# Patient Record
Sex: FEMALE | Race: WHITE | NOT HISPANIC OR LATINO | Employment: FULL TIME | URBAN - METROPOLITAN AREA
[De-identification: names, ages, dates, MRNs, and addresses within clinical notes are randomized per-mention and may not be internally consistent; named-entity substitution may affect disease eponyms.]

---

## 2017-01-04 ENCOUNTER — MEDICAL CORRESPONDENCE (OUTPATIENT)
Dept: HEALTH INFORMATION MANAGEMENT | Facility: CLINIC | Age: 52
End: 2017-01-04

## 2017-01-10 ENCOUNTER — MYC MEDICAL ADVICE (OUTPATIENT)
Dept: DERMATOLOGY | Facility: CLINIC | Age: 52
End: 2017-01-10

## 2017-01-10 DIAGNOSIS — L30.4 INTERTRIGO: Primary | ICD-10-CM

## 2017-01-17 RX ORDER — FLUCONAZOLE 100 MG/1
TABLET ORAL
Qty: 14 TABLET | Refills: 0
Start: 2017-01-17 | End: 2017-01-19

## 2017-01-19 RX ORDER — FLUCONAZOLE 100 MG/1
TABLET ORAL
Qty: 14 TABLET | Refills: 0 | Status: SHIPPED | OUTPATIENT
Start: 2017-01-19 | End: 2017-03-13

## 2017-02-08 ENCOUNTER — TELEPHONE (OUTPATIENT)
Dept: FAMILY MEDICINE | Facility: CLINIC | Age: 52
End: 2017-02-08

## 2017-02-08 NOTE — Clinical Note
[facility]  [address] [address]  [phone]      February 8, 2017    Rose Mary Rose  [address] [facility] [address]          Dear Rose Mary,    --Mammogram screening is generally recommended every year starting at age of 50.  Some women may choose to be screened at an earlier age ( between 40 & 50) depending on risk factors and discussions with her primary provider. According to our records, your last mammogram was 10/3/2014; please call [phone] to schedule a mammogram.    If you have had this test at an outside facility, please let us know so that we can update your record.     Please disregard this notice if you have already scheduled an appointment.     Sincerely,    Soledad MELENDEZ  [facility] - [phone]  [email or web address]

## 2017-02-08 NOTE — TELEPHONE ENCOUNTER
Panel Management Review      Patient has the following on her problem list:     Asthma review     ACT Total Scores 7/22/2016   ACT TOTAL SCORE -   ASTHMA ER VISITS -   ASTHMA HOSPITALIZATIONS -   ACT TOTAL SCORE (Goal Greater than or Equal to 20) 21   In the past 12 months, how many times did you visit the emergency room for your asthma without being admitted to the hospital? 0   In the past 12 months, how many times were you hospitalized overnight because of your asthma? 0      1. Is Asthma diagnosis on the Problem List? Yes    2. Is Asthma listed on Health Maintenance? Yes    3. Patient is due for:  none      Composite cancer screening  Chart review shows that this patient is due/due soon for the following Mammogram  Summary:    Patient is due/failing the following:   MAMMOGRAM    Action needed:   Needs appointment for mammogram    Type of outreach:    Sent letter.    Questions for provider review:    None                                                                                                                                    JAVIER JHA

## 2017-03-13 ENCOUNTER — OFFICE VISIT (OUTPATIENT)
Dept: FAMILY MEDICINE | Facility: CLINIC | Age: 52
End: 2017-03-13
Payer: COMMERCIAL

## 2017-03-13 VITALS
WEIGHT: 165.5 LBS | HEART RATE: 60 BPM | SYSTOLIC BLOOD PRESSURE: 102 MMHG | HEIGHT: 61 IN | BODY MASS INDEX: 31.25 KG/M2 | TEMPERATURE: 98.1 F | DIASTOLIC BLOOD PRESSURE: 80 MMHG

## 2017-03-13 DIAGNOSIS — K21.9 GASTROESOPHAGEAL REFLUX DISEASE, ESOPHAGITIS PRESENCE NOT SPECIFIED: Primary | ICD-10-CM

## 2017-03-13 PROCEDURE — 99213 OFFICE O/P EST LOW 20 MIN: CPT | Performed by: NURSE PRACTITIONER

## 2017-03-13 NOTE — NURSING NOTE
"Chief Complaint   Patient presents with     Heartburn     Rash       Initial /80 (BP Location: Right arm, Patient Position: Chair, Cuff Size: Adult Large)  Pulse 60  Temp 98.1  F (36.7  C) (Tympanic)  Ht 5' 1.25\" (1.556 m)  Wt 165 lb 8 oz (75.1 kg)  BMI 31.02 kg/m2 Estimated body mass index is 31.02 kg/(m^2) as calculated from the following:    Height as of this encounter: 5' 1.25\" (1.556 m).    Weight as of this encounter: 165 lb 8 oz (75.1 kg).  Medication Reconciliation: complete      "

## 2017-03-13 NOTE — MR AVS SNAPSHOT
After Visit Summary   3/13/2017    Rose Mary Rose    MRN: [id number]           Patient Information     Date Of Birth          1965        Visit Information        Provider Department      3/13/2017 5:00 PM Soledad Bauman APRN [facility]        Today's Diagnoses     Gastroesophageal reflux disease, esophagitis presence not specified    -  1      Care Instructions    For heartburn:  Schedule upper endoscopy  1. Avoid eating within 3-4 hours of bedtime.    2. Eat frequent small meals per day rather than large meals.    3. Avoid tobacco and alcohol products, avoid tight fitting clothes, elevate head of bed with six inch blocks.  4. Take antacids, like TUMS, for occasional heart burn.    5. Avoid NSAIDS (ibuprofen, naproxen).  6. If overweight, weight loss is recommended. Losing even as little as 10 lbs may decrease symptoms.  7. Avoid high fat meals and other foods that aggravate the problem. Foods that may cause more symptoms are: chocolate, tomato-based foods, alcohol, peppermint, caffeinated products, citrus fruits and drinks, onions and garlic.        [place] Mammo Schedule   [place]- [phone]  2nd/4th Monday morning   Every other Wednesday afternoon   [place]- [phone]  2nd/4th Wednesday morning  [place]- [phone]  1st/3rd Wednesday morning   [facility]- [phone]  2nd/4th Monday afternoon   Every other Wednesday afternoon   [place]- [phone]  Every Monday morning   Every Tuesday afternoon   Every Wednesday, Thursday, Friday   Mammogram walk-in hours in [place]: Monday-Friday, 8 a.m. - 4 p.m.  Questions? Call [phone].            Follow-ups after your visit        Additional Services     GASTROENTEROLOGY ADULT REF PROCEDURE ONLY       Last Lab Result: Creatinine (mg/dL)       Date                     Value                 07/23/2016               0.65             ----------  Body mass index is 31.02 kg/(m^2).      Needed:  No  Language:  English    Patient will be contacted to schedule procedure.     Please be aware that coverage of these services is subject to the terms and limitations of your health insurance plan.  Call member services at your health plan with any benefit or coverage questions.  Any procedures must be performed at a [facility] facility OR coordinated by your clinic's referral office.    Please bring the following with you to your appointment:    (1) Any X-Rays, CTs or MRIs which have been performed.  Contact the facility where they were done to arrange for  prior to your scheduled appointment.    (2) List of current medications   (3) This referral request   (4) Any documents/labs given to you for this referral                  Your next 10 appointments already scheduled     Mar 13, 2017  5:00 PM CDT   Ayana Long with REDD Martinez CNP   [facility]    [address] [place] [address]   [phone]            May 19, 2017 10:00 AM CDT   Ayana Physical Adult with REDD Martinez CNP   [facility]    [address] [address]   [phone]            May 19, 2017 11:30 AM CDT   Screening Mammogram with [address] [facility] ([facility])    [address] [address]   [phone]           Do NOT use body powder, lotions, perfume or deodorant the day of the exam.      If your last mammogram was not done at [facility], please bring your mammogram films. We will need the name of your provider to send a copy of your report.        A mammogram may be covered on an annual or biannual basis, please check with your insurance company.               Who to contact     If you have questions or need follow up information about today's clinic visit or your schedule please contact [facility] directly at  "[phone].  Normal or non-critical lab and imaging results will be communicated to you by MyChart, letter or phone within 4 business days after the clinic has received the results. If you do not hear from us within 7 days, please contact the clinic through [employer]ERIT[employer] or phone. If you have a critical or abnormal lab result, we will notify you by phone as soon as possible.  Submit refill requests through [employer] or call your pharmacy and they will forward the refill request to us. Please allow 3 business days for your refill to be completed.          Additional Information About Your Visit        NERITES[employer] Information     [employer] gives you secure access to your electronic health record. If you see a primary care provider, you can also send messages to your care team and make appointments. If you have questions, please call your primary care clinic.  If you do not have a primary care provider, please call [phone] and they will assist you.        Care EveryWhere ID     This is your Care EveryWhere ID. This could be used by other organizations to access your [place] medical records  [id number]        Your Vitals Were     Pulse Temperature Height BMI (Body Mass Index)          60 98.1  F (36.7  C) (Tympanic) 5' 1.25\" (1.556 m) 31.02 kg/m2         Blood Pressure from Last 3 Encounters:   03/13/17 102/80   12/05/16 117/61   12/04/16 115/58    Weight from Last 3 Encounters:   03/13/17 165 lb 8 oz (75.1 kg)   12/05/16 170 lb 4.8 oz (77.2 kg)   07/22/16 174 lb (78.9 kg)              We Performed the Following     GASTROENTEROLOGY ADULT REF PROCEDURE ONLY        Primary Care Provider Office Phone # Fax #    Soledad REDD Nuno [facility] [phone]       [facility] [address] [address]        Thank you!     Thank you for choosing [facility]  for your care. Our goal is always to provide you with excellent care. Hearing back from our patients is one way we can " continue to improve our services. Please take a few minutes to complete the written survey that you may receive in the mail after your visit with us. Thank you!             Your Updated Medication List - Protect others around you: Learn how to safely use, store and throw away your medicines at www.disposemymeds.org.          This list is accurate as of: 3/13/17  4:59 PM.  Always use your most recent med list.                   Brand Name Dispense Instructions for use    * albuterol 108 (90 BASE) MCG/ACT Inhaler    PROAIR HFA/PROVENTIL HFA/VENTOLIN HFA    3 Inhaler    Inhale 2 puffs into the lungs every 6 hours as needed for shortness of breath / dyspnea       * albuterol (2.5 MG/3ML) 0.083% neb solution     3 Box    Take 1 vial (2.5 mg) by nebulization every 4 hours as needed for shortness of breath / dyspnea       ciclopirox 0.77 % cream    LOPROX    90 g    Apply daily to breasts twice daily.       clobetasol 0.05 % cream    TEMOVATE    60 g    Apply sparingly to affected area twice daily as needed.  Do not apply to face. Wait to fill prescription until patient calls.       fluticasone 110 MCG/ACT Inhaler    FLOVENT HFA    3 Inhaler    Inhale 2 puffs into the lungs 2 times daily 2 puffs       fluticasone 50 MCG/ACT spray    FLONASE    3 Bottle    Spray 2 sprays into both nostrils daily       ipratropium - albuterol 0.5 mg/2.5 mg/3 mL 0.5-2.5 (3) MG/3ML neb solution    DUONEB    1 Box    Take 1 vial (3 mLs) by nebulization every 6 hours as needed for shortness of breath / dyspnea or wheezing       lisinopril 10 MG tablet    PRINIVIL/ZESTRIL    90 tablet    Take 1 tablet (10 mg) by mouth daily       loratadine 10 MG tablet    CLARITIN    90 tablet    Take 1 tablet (10 mg) by mouth daily       * Notice:  This list has 2 medication(s) that are the same as other medications prescribed for you. Read the directions carefully, and ask your doctor or other care provider to review them with you.

## 2017-03-13 NOTE — PROGRESS NOTES
"  SUBJECTIVE:                                                    Rose Mary Rose is a 51 year old female who presents to clinic today for the following health issues:      GERD/Heartburn      Duration: End of December - rare problem prior    Description (location/character/radiation): upper chest radiating into bilateral arms    Happens every night.    Sometimes happens after eating.    Intensity:  moderate    Accompanying signs and symptoms:  food getting stuck: no   nausea/vomiting/blood: no   abdominal pain: no   black/tarry or bloody stools: no :    History (similar episodes/previous evaluation): None    Precipitating or alleviating factors:  worse with chocolate.  current NSAID/Aspirin use: no     Therapies tried and outcome: Omeprazole (Prilosec), Nexium and gaviscon.  Gaviscon seems to help the most     Rash      Duration: a few weeks    Description  Location: groin area, rash is gone now  Itching: moderate    Intensity:  moderate    Accompanying signs and symptoms: None    History (similar episodes/previous evaluation): None    Precipitating or alleviating factors:  New exposures:  None  Recent travel: no      Therapies tried and outcome: monistat cleared it up           Problem list and histories reviewed & adjusted, as indicated.  Additional history: as documented      Reviewed and updated as needed this visit by clinical staff       Reviewed and updated as needed this visit by Provider         ROS:  Constitutional, HEENT, cardiovascular, pulmonary, gi and gu systems are negative, except as otherwise noted.    OBJECTIVE:                                                    /80 (BP Location: Right arm, Patient Position: Chair, Cuff Size: Adult Large)  Pulse 60  Temp 98.1  F (36.7  C) (Tympanic)  Ht 5' 1.25\" (1.556 m)  Wt 165 lb 8 oz (75.1 kg)  BMI 31.02 kg/m2  Body mass index is 31.02 kg/(m^2).  GENERAL: healthy, alert and no distress  RESP: lungs clear to auscultation - no rales, rhonchi or " wheezes  CV: regular rate and rhythm, normal S1 S2, no S3 or S4, no murmur, click or rub, no peripheral edema and peripheral pulses strong  ABDOMEN: soft, nontender, no hepatosplenomegaly, no masses and bowel sounds normal         ASSESSMENT/PLAN:                                                        ICD-10-CM    1. Gastroesophageal reflux disease, esophagitis presence not specified K21.9 GASTROENTEROLOGY ADULT REF PROCEDURE ONLY     Persistent GERD for 3 months.  Not responding to diet changes or medication.  Will obtain EGD.    Patient Instructions   For heartburn:  Schedule upper endoscopy  1. Avoid eating within 3-4 hours of bedtime.    2. Eat frequent small meals per day rather than large meals.    3. Avoid tobacco and alcohol products, avoid tight fitting clothes, elevate head of bed with six inch blocks.  4. Take antacids, like TUMS, for occasional heart burn.    5. Avoid NSAIDS (ibuprofen, naproxen).  6. If overweight, weight loss is recommended. Losing even as little as 10 lbs may decrease symptoms.  7. Avoid high fat meals and other foods that aggravate the problem. Foods that may cause more symptoms are: chocolate, tomato-based foods, alcohol, peppermint, caffeinated products, citrus fruits and drinks, onions and garlic.        [place] Mammo Schedule   [place]- [phone]  2nd/4th Monday morning   Every other Wednesday afternoon   [place]- [phone]  2nd/4th Wednesday morning  [place]- [phone]  1st/3rd Wednesday morning   [facility]- [phone]  2nd/4th Monday afternoon   Every other Wednesday afternoon   [place]- [phone]  Every Monday morning   Every Tuesday afternoon   Every Wednesday, Thursday, Friday   Mammogram walk-in hours in [place]: Monday-Friday, 8 a.m. - 4 p.m.  Questions? Call [phone].            The risks, benefits and treatment options of prescribed medications or other treatments have been discussed with the patient. The patient verbalized their understanding  and should call or follow up if no improvement or if they develop further problems.      REDD Tomkpins [facility]

## 2017-03-13 NOTE — PATIENT INSTRUCTIONS
For heartburn:  Schedule upper endoscopy  1. Avoid eating within 3-4 hours of bedtime.    2. Eat frequent small meals per day rather than large meals.    3. Avoid tobacco and alcohol products, avoid tight fitting clothes, elevate head of bed with six inch blocks.  4. Take antacids, like TUMS, for occasional heart burn.    5. Avoid NSAIDS (ibuprofen, naproxen).  6. If overweight, weight loss is recommended. Losing even as little as 10 lbs may decrease symptoms.  7. Avoid high fat meals and other foods that aggravate the problem. Foods that may cause more symptoms are: chocolate, tomato-based foods, alcohol, peppermint, caffeinated products, citrus fruits and drinks, onions and garlic.        [place] Mammo Schedule   [place]- [phone]  2nd/4th Monday morning   Every other Wednesday afternoon   [place]- [phone]  2nd/4th Wednesday morning  [place]- [phone]  1st/3rd Wednesday morning   [facility]- [phone]  2nd/4th Monday afternoon   Every other Wednesday afternoon   [place]- [phone]  Every Monday morning   Every Tuesday afternoon   Every Wednesday, Thursday, Friday   Mammogram walk-in hours in [place]: Monday-Friday, 8 a.m. - 4 p.m.  Questions? Call [phone].

## 2017-03-14 ASSESSMENT — ASTHMA QUESTIONNAIRES: ACT_TOTALSCORE: 21

## 2017-04-10 ENCOUNTER — ANESTHESIA EVENT (OUTPATIENT)
Dept: GASTROENTEROLOGY | Facility: CLINIC | Age: 52
End: 2017-04-10
Payer: COMMERCIAL

## 2017-04-10 ASSESSMENT — LIFESTYLE VARIABLES: TOBACCO_USE: 1

## 2017-04-10 NOTE — ANESTHESIA PREPROCEDURE EVALUATION
Anesthesia Evaluation     . Pt has had prior anesthetic.     No history of anesthetic complications          ROS/MED HX    ENT/Pulmonary:     (+)tobacco use, Past use asthma , . .    Neurologic:       Cardiovascular:     (+) hypertension----. : . . . :. . Previous cardiac testing date:results:date: results:ECG reviewed date:11-6-07 results:NSR date: results:          METS/Exercise Tolerance:     Hematologic:         Musculoskeletal:         GI/Hepatic: Comment: Non alcoholic fatty liver  Family history of colon cancer    (+) GERD       Renal/Genitourinary:         Endo: Comment: Enlarged thyroid with cyst  prediabetes    (+) thyroid problem Obesity, .      Psychiatric:         Infectious Disease:         Malignancy:         Other: Comment: Abnormal PAP  Eczema  HPV                    Physical Exam  Normal systems: cardiovascular, pulmonary and dental    Airway   Mallampati: I  TM distance: >3 FB  Neck ROM: full    Dental     Cardiovascular   Rhythm and rate: regular and normal      Pulmonary    breath sounds clear to auscultation                    Anesthesia Plan      History & Physical Review  History and physical reviewed and following examination; no interval change.    ASA Status:  3 .    NPO Status:  > 4 hours    Plan for MAC   PONV prophylaxis:  Ondansetron (or other 5HT-3) and Dexamethasone or Solumedrol       Postoperative Care      Consents  Anesthetic plan, risks, benefits and alternatives discussed with:  Patient..                          .

## 2017-04-11 ENCOUNTER — ANESTHESIA (OUTPATIENT)
Dept: GASTROENTEROLOGY | Facility: CLINIC | Age: 52
End: 2017-04-11
Payer: COMMERCIAL

## 2017-05-05 ENCOUNTER — SURGERY (OUTPATIENT)
Age: 52
End: 2017-05-05

## 2017-05-05 ENCOUNTER — HOSPITAL ENCOUNTER (OUTPATIENT)
Facility: CLINIC | Age: 52
Discharge: HOME | End: 2017-05-05
Attending: SURGERY | Admitting: SURGERY
Payer: COMMERCIAL

## 2017-05-05 VITALS
WEIGHT: 162 LBS | HEIGHT: 62 IN | RESPIRATION RATE: 16 BRPM | TEMPERATURE: 98.9 F | SYSTOLIC BLOOD PRESSURE: 100 MMHG | BODY MASS INDEX: 29.81 KG/M2 | HEART RATE: 67 BPM | DIASTOLIC BLOOD PRESSURE: 59 MMHG | OXYGEN SATURATION: 94 %

## 2017-05-05 LAB — UPPER GI ENDOSCOPY: NORMAL

## 2017-05-05 PROCEDURE — 37000008 ZZH ANESTHESIA TECHNICAL FEE, 1ST 30 MIN: Performed by: SURGERY

## 2017-05-05 PROCEDURE — 43239 EGD BIOPSY SINGLE/MULTIPLE: CPT | Performed by: SURGERY

## 2017-05-05 PROCEDURE — 88342 IMHCHEM/IMCYTCHM 1ST ANTB: CPT | Mod: 26 | Performed by: SURGERY

## 2017-05-05 PROCEDURE — 25800025 ZZH RX 258: Performed by: SURGERY

## 2017-05-05 PROCEDURE — 88342 IMHCHEM/IMCYTCHM 1ST ANTB: CPT | Performed by: SURGERY

## 2017-05-05 PROCEDURE — 25000125 ZZHC RX 250: Performed by: SURGERY

## 2017-05-05 PROCEDURE — 25000125 ZZHC RX 250: Performed by: NURSE ANESTHETIST, CERTIFIED REGISTERED

## 2017-05-05 PROCEDURE — 88305 TISSUE EXAM BY PATHOLOGIST: CPT | Performed by: SURGERY

## 2017-05-05 PROCEDURE — 88305 TISSUE EXAM BY PATHOLOGIST: CPT | Mod: 26 | Performed by: SURGERY

## 2017-05-05 RX ORDER — SODIUM CHLORIDE, SODIUM LACTATE, POTASSIUM CHLORIDE, CALCIUM CHLORIDE 600; 310; 30; 20 MG/100ML; MG/100ML; MG/100ML; MG/100ML
INJECTION, SOLUTION INTRAVENOUS CONTINUOUS
Status: DISCONTINUED | OUTPATIENT
Start: 2017-05-05 | End: 2017-05-05 | Stop reason: HOSPADM

## 2017-05-05 RX ORDER — LIDOCAINE 40 MG/G
CREAM TOPICAL
Status: DISCONTINUED | OUTPATIENT
Start: 2017-05-05 | End: 2017-05-05 | Stop reason: HOSPADM

## 2017-05-05 RX ORDER — PROPOFOL 10 MG/ML
INJECTION, EMULSION INTRAVENOUS PRN
Status: DISCONTINUED | OUTPATIENT
Start: 2017-05-05 | End: 2017-05-05

## 2017-05-05 RX ORDER — ONDANSETRON 2 MG/ML
4 INJECTION INTRAMUSCULAR; INTRAVENOUS
Status: DISCONTINUED | OUTPATIENT
Start: 2017-05-05 | End: 2017-05-05 | Stop reason: HOSPADM

## 2017-05-05 RX ADMIN — PROPOFOL 250 MG: 10 INJECTION, EMULSION INTRAVENOUS at 07:29

## 2017-05-05 RX ADMIN — LIDOCAINE HYDROCHLORIDE 1 ML: 10 INJECTION, SOLUTION INFILTRATION; PERINEURAL at 06:48

## 2017-05-05 RX ADMIN — PROPOFOL 50 MG: 10 INJECTION, EMULSION INTRAVENOUS at 07:31

## 2017-05-05 RX ADMIN — SODIUM CHLORIDE, POTASSIUM CHLORIDE, SODIUM LACTATE AND CALCIUM CHLORIDE: 600; 310; 30; 20 INJECTION, SOLUTION INTRAVENOUS at 06:49

## 2017-05-05 NOTE — BRIEF OP NOTE
[facility]   Brief Operative Note    Pre-operative diagnosis: gastroesophageal reflux disease,esophagitis presence not specified   Post-operative diagnosis mild gastritis, trivial hiatal hernia   Procedure: Procedure(s):  Gastroscopy - Wound Class: II-Clean Contaminated   Surgeon(s): Surgeon(s) and Role:     * Yonny Zambrano MD - Primary   Estimated blood loss: * No values recorded between 5/5/2017 12:00 AM and 5/5/2017  7:39 AM *    Specimens:   ID Type Source Tests Collected by Time Destination   A :  Biopsy Stomach, Antrum SURGICAL PATHOLOGY EXAM Yonny aZmbrano MD 5/5/2017  7:39 AM       Findings: 1. Normal esophagus  2.  Z-line at 35 cm, hiatus at 38 cm.  3.  Very mild gastritis - biopsied for h.pylori.  4.  Stomach and duodenum otherwise normal.

## 2017-05-05 NOTE — ANESTHESIA POSTPROCEDURE EVALUATION
Patient: Rose Mary Rose    Procedure(s):  Gastroscopy - Wound Class: II-Clean Contaminated    Diagnosis:gastroesophageal reflux disease,esophagitis presence not specified  Diagnosis Additional Information: No value filed.    Anesthesia Type:  MAC    Note:  Anesthesia Post Evaluation    Patient location during evaluation: Bedside  Patient participation: Able to fully participate in evaluation  Level of consciousness: awake and alert  Pain management: adequate  Airway patency: patent  Cardiovascular status: acceptable  Respiratory status: acceptable  Hydration status: acceptable  PONV: none     Anesthetic complications: None          Last vitals:  Vitals:    05/05/17 0624   BP: 119/64   Pulse: 62   Resp: 16   Temp: 37.2  C (98.9  F)   SpO2: 96%         Electronically Signed By: REDD Trinidad CRNA  May 5, 2017  7:53 AM

## 2017-05-05 NOTE — H&P
52 year old year old female here for upper endoscopy for chronic GERD.        Patient Active Problem List   Diagnosis     Obesity     Enlarged thyroid with 2mm cyst      Family history of colon cancer     CARDIOVASCULAR SCREENING; LDL GOAL LESS THAN 130     Family history of diabetes mellitus     GERD (gastroesophageal reflux disease)     Moderate persistent asthma     Prediabetes     NAFLD (nonalcoholic fatty liver disease)     HTN (hypertension)       Past Medical History:   Diagnosis Date     Abnormal Papanicolaou smear of cervix and cervical HPV 2003    Cryo  (path results?)     Eczema      Human papillomavirus in conditions classified elsewhere and of unspecified site     Genital warts       Past Surgical History:   Procedure Laterality Date     COLONOSCOPY  12/3/2012    Procedure: COLONOSCOPY;  Colonoscopy;  Surgeon: Darnell Siddiqui MD;  Location: [facility]     NO HISTORY OF SURGERY  8/2007       Family History   Problem Relation Age of Onset     C.A.D. Mother      Hypertension Mother      C.A.D. Maternal Grandmother      Hypertension Maternal Grandmother      CEREBROVASCULAR DISEASE Maternal Grandmother      CANCER Paternal Grandfather      ? type     DIABETES Paternal Grandfather      Adult onset     CANCER Maternal Grandfather      ? renal     DIABETES Father      Hypertension Father      Cancer - colorectal Father      Respiratory Daughter      asthma     HEART DISEASE Brother      double bypass     C.A.D. Brother      CANCER Brother      testicular ca     Hypertension Brother      Breast Cancer No family hx of        No current outpatient prescriptions on file.       Allergies   Allergen Reactions     Penicillins      rash       Pt reports that she quit smoking about 13 months ago. Her smoking use included Cigarettes. She has a 6.25 pack-year smoking history. She has never used smokeless tobacco. She reports that she does not drink alcohol or use illicit drugs.    Exam:    Awake, Alert OX3  Lungs - CTA  bilaterally  CV - RRR, no murmurs, distal pulses intact  Abd - soft, non-distended, non-tender, +BS  Extr - No cyanosis or edema    A/P 52 year old year old female in need of upper endoscopy for chronic GERD. Risks, benefits, alternatives, and complications were discussed including the possibility of perforation and the patient agreed to proceed.    Yonny Zambrano MD

## 2017-05-10 LAB — COPATH REPORT: NORMAL

## 2017-06-08 ENCOUNTER — HOSPITAL ENCOUNTER (OUTPATIENT)
Dept: MAMMOGRAPHY | Facility: CLINIC | Age: 52
Discharge: HOME | End: 2017-06-08
Attending: NURSE PRACTITIONER | Admitting: NURSE PRACTITIONER
Payer: COMMERCIAL

## 2017-06-08 DIAGNOSIS — Z12.31 VISIT FOR SCREENING MAMMOGRAM: ICD-10-CM

## 2017-06-08 PROCEDURE — G0202 SCR MAMMO BI INCL CAD: HCPCS

## 2017-06-09 ENCOUNTER — OFFICE VISIT (OUTPATIENT)
Dept: FAMILY MEDICINE | Facility: CLINIC | Age: 52
End: 2017-06-09
Payer: COMMERCIAL

## 2017-06-09 VITALS
WEIGHT: 164.5 LBS | HEIGHT: 62 IN | HEART RATE: 66 BPM | DIASTOLIC BLOOD PRESSURE: 70 MMHG | TEMPERATURE: 97.9 F | SYSTOLIC BLOOD PRESSURE: 117 MMHG | BODY MASS INDEX: 30.27 KG/M2

## 2017-06-09 DIAGNOSIS — Z12.11 COLON CANCER SCREENING: ICD-10-CM

## 2017-06-09 DIAGNOSIS — J45.40 MODERATE PERSISTENT ASTHMA WITHOUT COMPLICATION: ICD-10-CM

## 2017-06-09 DIAGNOSIS — Z00.00 ROUTINE GENERAL MEDICAL EXAMINATION AT A HEALTH CARE FACILITY: Primary | ICD-10-CM

## 2017-06-09 DIAGNOSIS — I10 ESSENTIAL HYPERTENSION WITH GOAL BLOOD PRESSURE LESS THAN 140/90: ICD-10-CM

## 2017-06-09 DIAGNOSIS — K76.0 NAFLD (NONALCOHOLIC FATTY LIVER DISEASE): ICD-10-CM

## 2017-06-09 DIAGNOSIS — K21.9 GASTROESOPHAGEAL REFLUX DISEASE WITHOUT ESOPHAGITIS: ICD-10-CM

## 2017-06-09 DIAGNOSIS — A63.0 GENITAL WARTS: ICD-10-CM

## 2017-06-09 PROCEDURE — 99396 PREV VISIT EST AGE 40-64: CPT | Performed by: NURSE PRACTITIONER

## 2017-06-09 RX ORDER — ALBUTEROL SULFATE 0.83 MG/ML
1 SOLUTION RESPIRATORY (INHALATION) EVERY 4 HOURS PRN
Qty: 3 BOX | Refills: 3 | Status: SHIPPED | OUTPATIENT
Start: 2017-06-09 | End: 2018-02-13

## 2017-06-09 RX ORDER — ALBUTEROL SULFATE 90 UG/1
2 AEROSOL, METERED RESPIRATORY (INHALATION) EVERY 6 HOURS PRN
Qty: 3 INHALER | Refills: 3 | Status: SHIPPED | OUTPATIENT
Start: 2017-06-09 | End: 2017-07-17

## 2017-06-09 RX ORDER — LORATADINE 10 MG/1
10 TABLET ORAL DAILY
Qty: 90 TABLET | Refills: 3 | Status: SHIPPED | OUTPATIENT
Start: 2017-06-09 | End: 2017-09-18

## 2017-06-09 RX ORDER — IPRATROPIUM BROMIDE AND ALBUTEROL SULFATE 2.5; .5 MG/3ML; MG/3ML
1 SOLUTION RESPIRATORY (INHALATION) EVERY 6 HOURS PRN
Qty: 1 BOX | Refills: 3 | Status: SHIPPED | OUTPATIENT
Start: 2017-06-09 | End: 2018-06-05

## 2017-06-09 RX ORDER — LISINOPRIL 10 MG/1
10 TABLET ORAL DAILY
Qty: 90 TABLET | Refills: 3 | Status: SHIPPED | OUTPATIENT
Start: 2017-06-09 | End: 2017-07-17

## 2017-06-09 NOTE — PROGRESS NOTES
SUBJECTIVE:     CC: Rose Mary Rose is an 52 year old woman who presents for preventive health visit.     Physical   Annual:     Getting at least 3 servings of Calcium per day::  Yes    Bi-annual eye exam::  Yes    Dental care twice a year::  Yes    Sleep apnea or symptoms of sleep apnea::  None    Frequency of exercise::  4-5 days/week    Duration of exercise::  15-30 minutes    Taking medications regularly::  Yes    Medication side effects::  Not applicable    Additional concerns today::  YES        Review endoscopy results; discuss medication  Still having heartburn.  Zantac 150 mg BID is somewhat helpful but still having symptoms at night.  Asking for a prescription strength antireflux medication.      Today's PHQ-2 Score:   PHQ-2 ( 1999 Pfizer) 6/5/2017   Q1: Little interest or pleasure in doing things 0   Q2: Feeling down, depressed or hopeless 0   PHQ-2 Score 0   Q1: Little interest or pleasure in doing things Not at all   Q2: Feeling down, depressed or hopeless Not at all   PHQ-2 Score 0       Abuse: Current or Past(Physical, Sexual or Emotional)- No  Do you feel safe in your environment - Yes    Social History   Substance Use Topics     Smoking status: Former Smoker     Packs/day: 0.25     Years: 25.00     Types: Cigarettes     Quit date: 3/14/2016     Smokeless tobacco: Never Used      Comment: currently using the patch to quit  2/2016     Alcohol use No      Comment: 1 beer per month, usually none     The patient does not drink >3 drinks per day nor >7 drinks per week.    Recent Labs   Lab Test  07/23/16   0753  07/24/15   0835  10/04/14   0700   CHOL  184  201*  203*   HDL  69  66  67   LDL  93  99  100   TRIG  109  180*  181*   CHOLHDLRATIO   --   3.0  3.0   NHDL  115   --    --        Reviewed orders with patient.  Reviewed health maintenance and updated orders accordingly - Yes    Mammo Decision Support:  Patient over age 50, mutual decision to screen reflected in health  "maintenance.    Pertinent mammograms are reviewed under the imaging tab.    History of abnormal Pap smear: YES - updated in Problem List and Health Maintenance accordingly. In her 20s - had cryo, all normal since    Reviewed and updated as needed this visit by clinical staff  Tobacco  Meds         Reviewed and updated as needed this visit by Provider            ROS:  C: NEGATIVE for fever, chills, change in weight  I: NEGATIVE for worrisome rashes, moles or lesions  E: NEGATIVE for vision changes or irritation  ENT: NEGATIVE for ear, mouth and throat problems  R: NEGATIVE for significant cough or SOB  B: NEGATIVE for masses, tenderness or discharge  CV: NEGATIVE for chest pain, palpitations or peripheral edema  GI: NEGATIVE for nausea, abdominal pain, heartburn, or change in bowel habits  : NEGATIVE for unusual urinary or vaginal symptoms. No vaginal bleeding for 4 months.  M: NEGATIVE for significant arthralgias or myalgia  N: NEGATIVE for weakness, dizziness or paresthesias  P: NEGATIVE for changes in mood or affect     Problem list, Medication list, Allergies, and Medical/Social/Surgical histories reviewed in EPIC and updated as appropriate.      OBJECTIVE:     /70 (BP Location: Left arm)  Pulse 66  Temp 97.9  F (36.6  C) (Oral)  Ht 5' 1.5\" (1.562 m)  Wt 164 lb 8 oz (74.6 kg)  LMP 04/06/2016  BMI 30.58 kg/m2  EXAM:  GENERAL APPEARANCE: healthy, alert and no distress  EYES: Eyes grossly normal to inspection, PERRL and conjunctivae and sclerae normal  HENT: ear canals and TM's normal, nose and mouth without ulcers or lesions, oropharynx clear and oral mucous membranes moist  NECK: no adenopathy, no asymmetry, masses, or scars and thyroid normal to palpation  RESP: lungs clear to auscultation - no rales, rhonchi or wheezes  BREAST: normal without masses, tenderness or nipple discharge and no palpable axillary masses or adenopathy  CV: regular rate and rhythm, normal S1 S2, no S3 or S4, no murmur, " click or rub, no peripheral edema and peripheral pulses strong  ABDOMEN: soft, nontender, no hepatosplenomegaly, no masses and bowel sounds normal   (female): normal female external genitalia and normal urethral meatus. There is one smal 2 mm genital wart on the right labia majora.   MS: no musculoskeletal defects are noted and gait is age appropriate without ataxia  SKIN: no suspicious lesions or rashes  NEURO: Normal strength and tone, sensory exam grossly normal, mentation intact and speech normal  PSYCH: mentation appears normal and affect normal/bright    ASSESSMENT/PLAN:         ICD-10-CM    1. Routine general medical examination at a health care facility Z00.00 Lipid panel reflex to direct LDL     Basic metabolic panel     Hemoglobin A1c     2. Moderate persistent asthma without complication J45.40 Well controlled.    loratadine (CLARITIN) 10 MG tablet     albuterol (PROAIR HFA/PROVENTIL HFA/VENTOLIN HFA) 108 (90 BASE) MCG/ACT Inhaler     albuterol (2.5 MG/3ML) 0.083% neb solution     3. Essential hypertension with goal blood pressure less than 140/90 I10 Well controlled.  lisinopril (PRINIVIL/ZESTRIL) 10 MG tablet     Basic metabolic panel     5. Gastroesophageal reflux disease without esophagitis K21.9 Poorly controlled on Zantac  Start omeprazole (PRILOSEC) 20 MG CR capsule     6. NAFLD (nonalcoholic fatty liver disease) K76.0 Hepatic panel - recheck     7. Colon cancer screening Z12.11 GASTROENTEROLOGY ADULT REF PROCEDURE ONLY     8. Genital warts A63.0 Discussed options for treatment.  She doesn't want to do home treatment - worried she won't be able to apply it accurately.  Will schedule with OB/GYN for cryo therapy.       COUNSELING:  Reviewed preventive health counseling, as reflected in patient instructions         reports that she quit smoking about 14 months ago. Her smoking use included Cigarettes. She has a 6.25 pack-year smoking history. She has never used smokeless tobacco.    Estimated body  "mass index is 30.58 kg/(m^2) as calculated from the following:    Height as of this encounter: 5' 1.5\" (1.562 m).    Weight as of this encounter: 164 lb 8 oz (74.6 kg).   Weight management plan: Discussed healthy diet and exercise guidelines and patient will follow up in 12 months in clinic to re-evaluate.    The risks, benefits and treatment options of prescribed medications or other treatments have been discussed with the patient. The patient verbalized their understanding and should call or follow up if no improvement or if they develop further problems.    REDD Tompkins [facility]  Answers for HPI/ROS submitted by the patient on 6/5/2017   PHQ-2 Score: 0    "

## 2017-06-09 NOTE — MR AVS SNAPSHOT
After Visit Summary   6/9/2017    Rose Mary Rose    MRN: [id number]           Patient Information     Date Of Birth          1965        Visit Information        Provider Department      6/9/2017 8:40 AM Soledad Bauman APRN [facility]        Today's Diagnoses     Routine general medical examination at a health care facility    -  1    Moderate persistent asthma without complication        Essential hypertension with goal blood pressure less than 140/90        Tobacco abuse        Gastroesophageal reflux disease without esophagitis        Colon cancer screening          Care Instructions      Preventive Health Recommendations  Female Ages 50 - 64    Yearly exam: See your health care provider every year in order to  o Review health changes.   o Discuss preventive care.    o Review your medicines if your doctor has prescribed any.      Get a Pap test every three years (unless you have an abnormal result and your provider advises testing more often).    If you get Pap tests with HPV test, you only need to test every 5 years, unless you have an abnormal result.     You do not need a Pap test if your uterus was removed (hysterectomy) and you have not had cancer.    You should be tested each year for STDs (sexually transmitted diseases) if you're at risk.     Have a mammogram every 1 to 2 years.    Have a colonoscopy at age 50, or have a yearly FIT test (stool test). These exams screen for colon cancer.      Have a cholesterol test every 5 years, or more often if advised.    Have a diabetes test (fasting glucose) every three years. If you are at risk for diabetes, you should have this test more often.     If you are at risk for osteoporosis (brittle bone disease), think about having a bone density scan (DEXA).    Shots: Get a flu shot each year. Get a tetanus shot every 10 years.    Nutrition:     Eat at least 5 servings of fruits and vegetables each day.    Eat  whole-grain bread, whole-wheat pasta and brown rice instead of white grains and rice.    Talk to your provider about Calcium and Vitamin D.     Lifestyle    Exercise at least 150 minutes a week (30 minutes a day, 5 days a week). This will help you control your weight and prevent disease.    Limit alcohol to one drink per day.    No smoking.     Wear sunscreen to prevent skin cancer.     See your dentist every six months for an exam and cleaning.    See your eye doctor every 1 to 2 years.            Follow-ups after your visit        Additional Services     GASTROENTEROLOGY ADULT REF PROCEDURE ONLY       Last Lab Result: Creatinine (mg/dL)       Date                     Value                 07/23/2016               0.65             ----------  Body mass index is 30.58 kg/(m^2).     Needed:  No  Language:  English    Patient will be contacted to schedule procedure.     Please be aware that coverage of these services is subject to the terms and limitations of your health insurance plan.  Call member services at your health plan with any benefit or coverage questions.  Any procedures must be performed at a [place] facility OR coordinated by your clinic's referral office.    Please bring the following with you to your appointment:    (1) Any X-Rays, CTs or MRIs which have been performed.  Contact the facility where they were done to arrange for  prior to your scheduled appointment.    (2) List of current medications   (3) This referral request   (4) Any documents/labs given to you for this referral                  Who to contact     If you have questions or need follow up information about today's clinic visit or your schedule please contact [facility] directly at [phone].  Normal or non-critical lab and imaging results will be communicated to you by MyChart, letter or phone within 4 business days after the clinic has received the results. If you do not hear from us within 7 days,  "please contact the clinic through [employer] or phone. If you have a critical or abnormal lab result, we will notify you by phone as soon as possible.  Submit refill requests through [employer] or call your pharmacy and they will forward the refill request to us. Please allow 3 business days for your refill to be completed.          Additional Information About Your Visit        [employer] Information     [employer] gives you secure access to your electronic health record. If you see a primary care provider, you can also send messages to your care team and make appointments. If you have questions, please call your primary care clinic.  If you do not have a primary care provider, please call [phone] and they will assist you.        Care EveryWhere ID     This is your Care EveryWhere ID. This could be used by other organizations to access your [place] medical records  [id number]        Your Vitals Were     Pulse Temperature Height Last Period BMI (Body Mass Index)       66 97.9  F (36.6  C) (Oral) 5' 1.5\" (1.562 m) 04/06/2016 30.58 kg/m2        Blood Pressure from Last 3 Encounters:   06/09/17 117/70   05/05/17 100/59   03/13/17 102/80    Weight from Last 3 Encounters:   06/09/17 164 lb 8 oz (74.6 kg)   05/05/17 162 lb (73.5 kg)   03/13/17 165 lb 8 oz (75.1 kg)              We Performed the Following     Basic metabolic panel     GASTROENTEROLOGY ADULT REF PROCEDURE ONLY     Lipid panel reflex to direct LDL          Today's Medication Changes          These changes are accurate as of: 6/9/17  9:08 AM.  If you have any questions, ask your nurse or doctor.               Start taking these medicines.        Dose/Directions    omeprazole 20 MG CR capsule   Commonly known as:  priLOSEC   Used for:  Gastroesophageal reflux disease without esophagitis   Started by:  Soledad Bauman APRN CNP        Dose:  20 mg   Take 1 capsule (20 mg) by mouth daily   Quantity:  30 capsule   Refills:  1         Stop taking these " medicines if you haven't already. Please contact your care team if you have questions.     ZANTAC PO   Stopped by:  Soledad Bauman APRN CNP                Where to get your medicines      These medications were [place] [facility] [place] - [address]  [address] [address]     Phone:  [phone]     albuterol (2.5 MG/3ML) 0.083% neb solution    albuterol 108 (90 BASE) MCG/ACT Inhaler    ipratropium - albuterol 0.5 mg/2.5 mg/3 mL 0.5-2.5 (3) MG/3ML neb solution    lisinopril 10 MG tablet    loratadine 10 MG tablet    omeprazole 20 MG CR capsule                Primary Care Provider Office Phone # Fax #    REDD Martinez [phone]       [facility] [address] [address]        Thank you!     Thank you for choosing [facility]  for your care. Our goal is always to provide you with excellent care. Hearing back from our patients is one way we can continue to improve our services. Please take a few minutes to complete the written survey that you may receive in the mail after your visit with us. Thank you!             Your Updated Medication List - Protect others around you: Learn how to safely use, store and throw away your medicines at www.disposemymeds.org.          This list is accurate as of: 6/9/17  9:08 AM.  Always use your most recent med list.                   Brand Name Dispense Instructions for use    * albuterol 108 (90 BASE) MCG/ACT Inhaler    PROAIR HFA/PROVENTIL HFA/VENTOLIN HFA    3 Inhaler    Inhale 2 puffs into the lungs every 6 hours as needed for shortness of breath / dyspnea       * albuterol (2.5 MG/3ML) 0.083% neb solution     3 Box    Take 1 vial (2.5 mg) by nebulization every 4 hours as needed for shortness of breath / dyspnea       clobetasol 0.05 % cream    TEMOVATE    60 g    Apply sparingly to affected area twice daily as needed.  Do not apply to face. Wait  to fill prescription until patient calls.       fluticasone 110 MCG/ACT Inhaler    FLOVENT HFA    3 Inhaler    Inhale 2 puffs into the lungs 2 times daily 2 puffs       fluticasone 50 MCG/ACT spray    FLONASE    3 Bottle    Spray 2 sprays into both nostrils daily       ipratropium - albuterol 0.5 mg/2.5 mg/3 mL 0.5-2.5 (3) MG/3ML neb solution    DUONEB    1 Box    Take 1 vial (3 mLs) by nebulization every 6 hours as needed for shortness of breath / dyspnea or wheezing       lisinopril 10 MG tablet    PRINIVIL/ZESTRIL    90 tablet    Take 1 tablet (10 mg) by mouth daily       loratadine 10 MG tablet    CLARITIN    90 tablet    Take 1 tablet (10 mg) by mouth daily       omeprazole 20 MG CR capsule    priLOSEC    30 capsule    Take 1 capsule (20 mg) by mouth daily       * Notice:  This list has 2 medication(s) that are the same as other medications prescribed for you. Read the directions carefully, and ask your doctor or other care provider to review them with you.

## 2017-06-09 NOTE — NURSING NOTE
"Chief Complaint   Patient presents with     Physical       Initial /70 (BP Location: Left arm)  Pulse 66  Temp 97.9  F (36.6  C) (Oral)  Ht 5' 1.5\" (1.562 m)  Wt 164 lb 8 oz (74.6 kg)  LMP 04/06/2016  BMI 30.58 kg/m2 Estimated body mass index is 30.58 kg/(m^2) as calculated from the following:    Height as of this encounter: 5' 1.5\" (1.562 m).    Weight as of this encounter: 164 lb 8 oz (74.6 kg).  Medication Reconciliation: complete  "

## 2017-06-10 DIAGNOSIS — Z00.00 ROUTINE GENERAL MEDICAL EXAMINATION AT A HEALTH CARE FACILITY: ICD-10-CM

## 2017-06-10 DIAGNOSIS — K76.0 NAFLD (NONALCOHOLIC FATTY LIVER DISEASE): ICD-10-CM

## 2017-06-10 DIAGNOSIS — I10 ESSENTIAL HYPERTENSION WITH GOAL BLOOD PRESSURE LESS THAN 140/90: ICD-10-CM

## 2017-06-10 LAB
ALBUMIN SERPL-MCNC: 3.5 G/DL (ref 3.4–5)
ALP SERPL-CCNC: 219 U/L (ref 40–150)
ALT SERPL W P-5'-P-CCNC: 55 U/L (ref 0–50)
ANION GAP SERPL CALCULATED.3IONS-SCNC: 8 MMOL/L (ref 3–14)
AST SERPL W P-5'-P-CCNC: 43 U/L (ref 0–45)
BILIRUB DIRECT SERPL-MCNC: 0.2 MG/DL (ref 0–0.2)
BILIRUB SERPL-MCNC: 0.8 MG/DL (ref 0.2–1.3)
BUN SERPL-MCNC: 10 MG/DL (ref 7–30)
CALCIUM SERPL-MCNC: 9.5 MG/DL (ref 8.5–10.1)
CHLORIDE SERPL-SCNC: 104 MMOL/L (ref 94–109)
CHOLEST SERPL-MCNC: 218 MG/DL
CO2 SERPL-SCNC: 28 MMOL/L (ref 20–32)
CREAT SERPL-MCNC: 0.71 MG/DL (ref 0.52–1.04)
GFR SERPL CREATININE-BSD FRML MDRD: 86 ML/MIN/1.7M2
GLUCOSE SERPL-MCNC: 97 MG/DL (ref 70–99)
HBA1C MFR BLD: 6 % (ref 4.3–6)
HDLC SERPL-MCNC: 56 MG/DL
LDLC SERPL CALC-MCNC: 124 MG/DL
NONHDLC SERPL-MCNC: 162 MG/DL
POTASSIUM SERPL-SCNC: 4.2 MMOL/L (ref 3.4–5.3)
PROT SERPL-MCNC: 7 G/DL (ref 6.8–8.8)
SODIUM SERPL-SCNC: 140 MMOL/L (ref 133–144)
TRIGL SERPL-MCNC: 188 MG/DL

## 2017-06-10 PROCEDURE — 80048 BASIC METABOLIC PNL TOTAL CA: CPT | Performed by: NURSE PRACTITIONER

## 2017-06-10 PROCEDURE — 80076 HEPATIC FUNCTION PANEL: CPT | Performed by: NURSE PRACTITIONER

## 2017-06-10 PROCEDURE — 80061 LIPID PANEL: CPT | Performed by: NURSE PRACTITIONER

## 2017-06-10 PROCEDURE — 83036 HEMOGLOBIN GLYCOSYLATED A1C: CPT | Performed by: NURSE PRACTITIONER

## 2017-06-10 PROCEDURE — 36415 COLL VENOUS BLD VENIPUNCTURE: CPT | Performed by: NURSE PRACTITIONER

## 2017-06-28 ENCOUNTER — MYC MEDICAL ADVICE (OUTPATIENT)
Dept: FAMILY MEDICINE | Facility: CLINIC | Age: 52
End: 2017-06-28

## 2017-06-28 DIAGNOSIS — J45.40 MODERATE PERSISTENT ASTHMA WITHOUT COMPLICATION: ICD-10-CM

## 2017-06-28 DIAGNOSIS — I10 ESSENTIAL HYPERTENSION WITH GOAL BLOOD PRESSURE LESS THAN 140/90: ICD-10-CM

## 2017-07-16 ENCOUNTER — MYC MEDICAL ADVICE (OUTPATIENT)
Dept: FAMILY MEDICINE | Facility: CLINIC | Age: 52
End: 2017-07-16

## 2017-07-17 RX ORDER — FLUTICASONE PROPIONATE 110 UG/1
2 AEROSOL, METERED RESPIRATORY (INHALATION) 2 TIMES DAILY
Qty: 3 INHALER | Refills: 3 | Status: SHIPPED | OUTPATIENT
Start: 2017-07-17 | End: 2017-10-09

## 2017-07-17 RX ORDER — LISINOPRIL 10 MG/1
10 TABLET ORAL DAILY
Qty: 90 TABLET | Refills: 3 | Status: SHIPPED | OUTPATIENT
Start: 2017-07-17 | End: 2018-06-05

## 2017-07-17 RX ORDER — ALBUTEROL SULFATE 90 UG/1
2 AEROSOL, METERED RESPIRATORY (INHALATION) EVERY 6 HOURS PRN
Qty: 3 INHALER | Refills: 3 | Status: SHIPPED | OUTPATIENT
Start: 2017-07-17 | End: 2017-10-24

## 2017-08-01 ENCOUNTER — MYC MEDICAL ADVICE (OUTPATIENT)
Dept: FAMILY MEDICINE | Facility: CLINIC | Age: 52
End: 2017-08-01

## 2017-08-01 DIAGNOSIS — K21.9 GASTROESOPHAGEAL REFLUX DISEASE WITHOUT ESOPHAGITIS: Primary | ICD-10-CM

## 2017-08-02 NOTE — TELEPHONE ENCOUNTER
Soledad, please see her mychart note, she was seen 6/9/17 and prescribed omeprazole 20 mg qd.   7/16 she had sent same/ similar note and was advised evisit or telephone visit, and now she sends another mychart note.    She did have a gastroscopy on 5/5/17   Biopsy result:   FINAL DIAGNOSIS:   Gastric antrum biopsy:   - No diagnostic abnormalities identified , benign gastric antral mucosa   without atypia or inflammation.    Negative for Helicobacter pylori on immunohistochemical stain sections.   Dee Lange RNC

## 2017-08-03 RX ORDER — OMEPRAZOLE 40 MG/1
40 CAPSULE, DELAYED RELEASE ORAL DAILY
Qty: 30 CAPSULE | Refills: 11 | Status: SHIPPED | OUTPATIENT
Start: 2017-08-03 | End: 2018-06-05

## 2017-08-03 NOTE — TELEPHONE ENCOUNTER
Sent the Rx to the preferred pharmacy as cued up by Soledad.   Advised via [employer].   Dee Lange RNC

## 2017-08-19 DIAGNOSIS — J45.40 MODERATE PERSISTENT ASTHMA WITHOUT COMPLICATION: ICD-10-CM

## 2017-08-20 RX ORDER — LORATADINE 10 MG/1
TABLET ORAL
Qty: 90 TABLET | Refills: 1 | Status: SHIPPED | OUTPATIENT
Start: 2017-08-20 | End: 2018-06-05

## 2017-09-18 ENCOUNTER — OFFICE VISIT (OUTPATIENT)
Dept: FAMILY MEDICINE | Facility: CLINIC | Age: 52
End: 2017-09-18
Payer: COMMERCIAL

## 2017-09-18 VITALS
SYSTOLIC BLOOD PRESSURE: 131 MMHG | HEIGHT: 62 IN | HEART RATE: 60 BPM | WEIGHT: 169 LBS | TEMPERATURE: 97.9 F | DIASTOLIC BLOOD PRESSURE: 64 MMHG | BODY MASS INDEX: 31.1 KG/M2

## 2017-09-18 DIAGNOSIS — M67.471 GANGLION CYST OF RIGHT FOOT: Primary | ICD-10-CM

## 2017-09-18 DIAGNOSIS — M79.10 MUSCLE ACHE: ICD-10-CM

## 2017-09-18 DIAGNOSIS — Z80.0 FAMILY HISTORY OF COLON CANCER: ICD-10-CM

## 2017-09-18 LAB
ALBUMIN SERPL-MCNC: 3.8 G/DL (ref 3.4–5)
ALP SERPL-CCNC: 227 U/L (ref 40–150)
ALT SERPL W P-5'-P-CCNC: 62 U/L (ref 0–50)
ANION GAP SERPL CALCULATED.3IONS-SCNC: 7 MMOL/L (ref 3–14)
AST SERPL W P-5'-P-CCNC: 35 U/L (ref 0–45)
BILIRUB SERPL-MCNC: 0.8 MG/DL (ref 0.2–1.3)
BUN SERPL-MCNC: 11 MG/DL (ref 7–30)
CALCIUM SERPL-MCNC: 9.3 MG/DL (ref 8.5–10.1)
CHLORIDE SERPL-SCNC: 104 MMOL/L (ref 94–109)
CK SERPL-CCNC: 151 U/L (ref 30–225)
CO2 SERPL-SCNC: 24 MMOL/L (ref 20–32)
CREAT SERPL-MCNC: 0.73 MG/DL (ref 0.52–1.04)
ERYTHROCYTE [DISTWIDTH] IN BLOOD BY AUTOMATED COUNT: 13.2 % (ref 10–15)
GFR SERPL CREATININE-BSD FRML MDRD: 83 ML/MIN/1.7M2
GLUCOSE SERPL-MCNC: 86 MG/DL (ref 70–99)
HCT VFR BLD AUTO: 44.4 % (ref 35–47)
HGB BLD-MCNC: 14.3 G/DL (ref 11.7–15.7)
MCH RBC QN AUTO: 30.8 PG (ref 26.5–33)
MCHC RBC AUTO-ENTMCNC: 32.2 G/DL (ref 31.5–36.5)
MCV RBC AUTO: 96 FL (ref 78–100)
PHOSPHATE SERPL-MCNC: 3.4 MG/DL (ref 2.5–4.5)
PLATELET # BLD AUTO: 215 10E9/L (ref 150–450)
POTASSIUM SERPL-SCNC: 3.9 MMOL/L (ref 3.4–5.3)
PROT SERPL-MCNC: 7.6 G/DL (ref 6.8–8.8)
RBC # BLD AUTO: 4.64 10E12/L (ref 3.8–5.2)
SODIUM SERPL-SCNC: 135 MMOL/L (ref 133–144)
T4 FREE SERPL-MCNC: 0.86 NG/DL (ref 0.76–1.46)
TSH SERPL DL<=0.005 MIU/L-ACNC: 11.88 MU/L (ref 0.4–4)
WBC # BLD AUTO: 11 10E9/L (ref 4–11)

## 2017-09-18 PROCEDURE — 80053 COMPREHEN METABOLIC PANEL: CPT | Performed by: NURSE PRACTITIONER

## 2017-09-18 PROCEDURE — 84443 ASSAY THYROID STIM HORMONE: CPT | Performed by: NURSE PRACTITIONER

## 2017-09-18 PROCEDURE — 82550 ASSAY OF CK (CPK): CPT | Performed by: NURSE PRACTITIONER

## 2017-09-18 PROCEDURE — 99214 OFFICE O/P EST MOD 30 MIN: CPT | Performed by: NURSE PRACTITIONER

## 2017-09-18 PROCEDURE — 84100 ASSAY OF PHOSPHORUS: CPT | Performed by: NURSE PRACTITIONER

## 2017-09-18 PROCEDURE — 84439 ASSAY OF FREE THYROXINE: CPT | Performed by: NURSE PRACTITIONER

## 2017-09-18 PROCEDURE — 36415 COLL VENOUS BLD VENIPUNCTURE: CPT | Performed by: NURSE PRACTITIONER

## 2017-09-18 PROCEDURE — 85027 COMPLETE CBC AUTOMATED: CPT | Performed by: NURSE PRACTITIONER

## 2017-09-18 PROCEDURE — 82306 VITAMIN D 25 HYDROXY: CPT | Performed by: NURSE PRACTITIONER

## 2017-09-18 NOTE — LETTER
My Asthma Action Plan  Name: Rose Mary Rose   YOB: 1965  Date: 9/18/2017   My doctor: REDD Tompkins CNP   My clinic: [facility]        My Control Medicine: { :843434}  My Rescue Medicine: { :468353}  {AAP include Oral Steroid:784074} My Asthma Severity: { :748673}  Avoid your asthma triggers: { :942512}        {Is patient a child or adult?:496875}       GREEN ZONE   Good Control    I feel good    No cough or wheeze    Can work, sleep and play without asthma symptoms       Take your asthma control medicine every day.     1. If exercise triggers your asthma, take your rescue medication    15 minutes before exercise or sports, and    During exercise if you have asthma symptoms  2. Spacer to use with inhaler: If you have a spacer, make sure to use it with your inhaler             YELLOW ZONE Getting Worse  I have ANY of these:    I do not feel good    Cough or wheeze    Chest feels tight    Wake up at night   1. Keep taking your Green Zone medications  2. Start taking your rescue medicine:    every 20 minutes for up to 1 hour. Then every 4 hours for 24-48 hours.  3. If you stay in the Yellow Zone for more than 12-24 hours, contact your doctor.  4. If you do not return to the Green Zone in 12-24 hours or you get worse, start taking your oral steroid medicine if prescribed by your provider.           RED ZONE Medical Alert - Get Help  I have ANY of these:    I feel awful    Medicine is not helping    Breathing getting harder    Trouble walking or talking    Nose opens wide to breathe       1. Take your rescue medicine NOW  2. If your provider has prescribed an oral steroid medicine, start taking it NOW  3. Call your doctor NOW  4. If you are still in the Red Zone after 20 minutes and you have not reached your doctor:    Take your rescue medicine again and    Call 911 or go to the emergency room right away    See your regular doctor within 2 weeks of an Emergency Room or  Urgent Care visit for follow-up treatment.        Electronically signed by: Tran Ambrose, September 18, 2017    Annual Reminders:  Meet with Asthma Educator,  Flu Shot in the Fall, consider Pneumonia Vaccination for patients with asthma (aged 19 and older).    Pharmacy:    [facility] - [place], [place] - [address] [facility] [place] [address] [facility] [place] - [address] - [place] - [address]                    Asthma Triggers  How To Control Things That Make Your Asthma Worse    Triggers are things that make your asthma worse.  Look at the list below to help you find your triggers and what you can do about them.  You can help prevent asthma flare-ups by staying away from your triggers.      Trigger                                                          What you can do   Cigarette Smoke  Tobacco smoke can make asthma worse. Do not allow smoking in your home, car or around you.  Be sure no one smokes at a child s day care or school.  If you smoke, ask your health care provider for ways to help you quit.  Ask family members to quit too.  Ask your health care provider for a referral to Quit Plan to help you quit smoking, or call 9-889-599-PLAN.     Colds, Flu, Bronchitis  These are common triggers of asthma. Wash your hands often.  Don t touch your eyes, nose or mouth.  Get a flu shot every year.     Dust Mites  These are tiny bugs that live in cloth or carpet. They are too small to see. Wash sheets and blankets in hot water every week.   Encase pillows and mattress in dust mite proof covers.  Avoid having carpet if you can. If you have carpet, vacuum weekly.   Use a dust mask and HEPA vacuum.   Pollen and Outdoor Mold  Some people are allergic to trees, grass, or weed pollen, or molds. Try to keep your windows closed.  Limit time out doors when pollen count is high.   Ask you health care  provider about taking medicine during allergy season.     Animal Dander  Some people are allergic to skin flakes, urine or saliva from pets with fur or feathers. Keep pets with fur or feathers out of your home.    If you can t keep the pet outdoors, then keep the pet out of your bedroom.  Keep the bedroom door closed.  Keep pets off cloth furniture and away from stuffed toys.     Mice, Rats, and Cockroaches  Some people are allergic to the waste from these pests.   Cover food and garbage.  Clean up spills and food crumbs.  Store grease in the refrigerator.   Keep food out of the bedroom.   Indoor Mold  This can be a trigger if your home has high moisture. Fix leaking faucets, pipes, or other sources of water.   Clean moldy surfaces.  Dehumidify basement if it is damp and smelly.   Smoke, Strong Odors, and Sprays  These can reduce air quality. Stay away from strong odors and sprays, such as perfume, powder, hair spray, paints, smoke incense, paint, cleaning products, candles and new carpet.   Exercise or Sports  Some people with asthma have this trigger. Be active!  Ask your doctor about taking medicine before sports or exercise to prevent symptoms.    Warm up for 5-10 minutes before and after sports or exercise.     Other Triggers of Asthma  Cold air:  Cover your nose and mouth with a scarf.  Sometimes laughing or crying can be a trigger.  Some medicines and food can trigger asthma.

## 2017-09-18 NOTE — MR AVS SNAPSHOT
After Visit Summary   9/18/2017    Rose Mary Rose    MRN: [id number]           Patient Information     Date Of Birth          1965        Visit Information        Provider Department      9/18/2017 5:20 PM Soledad Bauman APRN CNP [facility]        Today's Diagnoses     Ganglion cyst of right foot    -  1    Muscle ache        Family history of colon cancer          Care Instructions          Thank you for choosing [facility].  You may be receiving a survey in the mail from Naveed Clark regarding your visit today.  Please take a few minutes to complete and return the survey to let us know how we are doing.      If you have questions or concerns, please contact us via ScreenHits or you can contact your care team at [phone].    Our Clinic hours are:  Monday 6:40 am  to 7:00 pm  Tuesday -Friday 6:40 am to 5:00 pm    The [place] outpatient lab hours are:  Monday - Friday 6:10 am to 4:45 pm  Saturdays 7:00 am to 11:00 am  Appointments are required, call [phone]    If you have clinical questions after hours or would like to schedule an appointment,  call the clinic at [phone].            Follow-ups after your visit        Additional Services     GASTROENTEROLOGY ADULT REF PROCEDURE ONLY       Last Lab Result: Creatinine (mg/dL)       Date                     Value                 06/10/2017               0.71             ----------  Body mass index is 31.42 kg/(m^2).     Needed:  No  Language:  English    Patient will be contacted to schedule procedure.     Please be aware that coverage of these services is subject to the terms and limitations of your health insurance plan.  Call member services at your health plan with any benefit or coverage questions.  Any procedures must be performed at a [facility] facility OR coordinated by your clinic's referral office.    Please bring the following with you to your appointment:    (1) Any X-Rays, CTs or  MRIs which have been performed.  Contact the facility where they were done to arrange for  prior to your scheduled appointment.    (2) List of current medications   (3) This referral request   (4) Any documents/labs given to you for this referral            ORTHO  REFERRAL       [facility] is referring you to the Orthopedic  Services at [facility].       The  Representative will assist you in the coordination of your Orthopedic and Musculoskeletal Care as prescribed by your physician.    The  Representative will call you within 1 business day to help schedule your appointment, or you may contact the  Representative at:    All areas ~ [phone]     Type of Referral : [facility]       Timeframe requested: 3 - 5 days    Coverage of these services is subject to the terms and limitations of your health insurance plan.  Please call member services at your health plan with any benefit or coverage questions.      If X-rays, CT or MRI's have been performed, please contact the facility where they were done to arrange for , prior to your scheduled appointment.  Please bring this referral request to your appointment and present it to your specialist.                  Who to contact     If you have questions or need follow up information about today's clinic visit or your schedule please contact [facility] directly at [phone].  Normal or non-critical lab and imaging results will be communicated to you by MyChart, letter or phone within 4 business days after the clinic has received the results. If you do not hear from us within 7 days, please contact the clinic through MyChart or phone. If you have a critical or abnormal lab result, we will notify you by phone as soon as possible.  Submit refill requests through [email or web address] or call your pharmacy and they will forward the refill  "request to us. Please allow 3 business days for your refill to be completed.          Additional Information About Your Visit        [facility]ovig[employer]art Information     [email or web address] gives you secure access to your electronic health record. If you see a primary care provider, you can also send messages to your care team and make appointments. If you have questions, please call your primary care clinic.  If you do not have a primary care provider, please call [phone] and they will assist you.        Care EveryWhere ID     This is your Care EveryWhere ID. This could be used by other organizations to access your [facility] medical records  [id number]        Your Vitals Were     Pulse Temperature Height Last Period BMI (Body Mass Index)       60 97.9  F (36.6  C) (Oral) 5' 1.5\" (1.562 m) 04/06/2016 31.42 kg/m2        Blood Pressure from Last 3 Encounters:   09/18/17 131/64   06/09/17 117/70   05/05/17 100/59    Weight from Last 3 Encounters:   09/18/17 169 lb (76.7 kg)   06/09/17 164 lb 8 oz (74.6 kg)   05/05/17 162 lb (73.5 kg)              We Performed the Following     CBC with platelets     CK total     Comprehensive metabolic panel     GASTROENTEROLOGY ADULT REF PROCEDURE ONLY     ORTHO  REFERRAL     Phosphorus     TSH with free T4 reflex     Vitamin D Deficiency        Primary Care Provider Office Phone # Fax #    Soledad Gonzalez Dawson Bauman, APRN [phone] [address] [facility] [address]        Equal Access to Services     EDDA IQBAL : Hadii aad ku hadasho Soomaali, waaxda luqadaha, qaybta kaalmada adeegyada, waxay abelardo allen . So [facility] [phone].    ATENCIÓN: Si habla español, tiene a jones disposición servicios gratuitos de asistencia lingüística. Llame al [phone].    We comply with applicable federal civil rights laws and [place] laws. We do not discriminate on the basis of race, color, national origin, age, disability sex, sexual orientation or gender " identity.            Thank you!     Thank you for choosing [facility]  for your care. Our goal is always to provide you with excellent care. Hearing back from our patients is one way we can continue to improve our services. Please take a few minutes to complete the written survey that you may receive in the mail after your visit with us. Thank you!             Your Updated Medication List - Protect others around you: Learn how to safely use, store and throw away your medicines at www.disposemymeds.org.          This list is accurate as of: 9/18/17  6:12 PM.  Always use your most recent med list.                   Brand Name Dispense Instructions for use Diagnosis    * albuterol (2.5 MG/3ML) 0.083% neb solution     3 Box    Take 1 vial (2.5 mg) by nebulization every 4 hours as needed for shortness of breath / dyspnea    Moderate persistent asthma without complication       * albuterol 108 (90 BASE) MCG/ACT Inhaler    PROAIR HFA/PROVENTIL HFA/VENTOLIN HFA    3 Inhaler    Inhale 2 puffs into the lungs every 6 hours as needed for shortness of breath / dyspnea    Moderate persistent asthma without complication       clobetasol 0.05 % cream    TEMOVATE    60 g    Apply sparingly to affected area twice daily as needed.  Do not apply to face. Wait to fill prescription until patient calls.    Dyshidrotic eczema       fluticasone 110 MCG/ACT Inhaler    FLOVENT HFA    3 Inhaler    Inhale 2 puffs into the lungs 2 times daily 2 puffs    Moderate persistent asthma without complication       fluticasone 50 MCG/ACT spray    FLONASE    3 Bottle    Spray 2 sprays into both nostrils daily    Other acute sinusitis       ipratropium - albuterol 0.5 mg/2.5 mg/3 mL 0.5-2.5 (3) MG/3ML neb solution    DUONEB    1 Box    Take 1 vial (3 mLs) by nebulization every 6 hours as needed for shortness of breath / dyspnea or wheezing    Moderate persistent asthma without complication       lisinopril 10 MG tablet    PRINIVIL/ZESTRIL     90 tablet    Take 1 tablet (10 mg) by mouth daily    Essential hypertension with goal blood pressure less than 140/90       loratadine 10 MG tablet    CLARITIN    90 tablet    TAKE ONE TABLET BY MOUTH EVERY DAY    Moderate persistent asthma without complication       omeprazole 40 MG capsule    priLOSEC    30 capsule    Take 1 capsule (40 mg) by mouth daily Take 30-60 minutes before a meal.    Gastroesophageal reflux disease without esophagitis       * Notice:  This list has 2 medication(s) that are the same as other medications prescribed for you. Read the directions carefully, and ask your doctor or other care provider to review them with you.

## 2017-09-18 NOTE — PATIENT INSTRUCTIONS
Thank you for choosing [facility].  You may be receiving a survey in the mail from Naveed Clark regarding your visit today.  Please take a few minutes to complete and return the survey to let us know how we are doing.      If you have questions or concerns, please contact us via HistoPathway or you can contact your care team at [phone].    Our Clinic hours are:  Monday 6:40 am  to 7:00 pm  Tuesday -Friday 6:40 am to 5:00 pm    The [place] outpatient lab hours are:  Monday - Friday 6:10 am to 4:45 pm  Saturdays 7:00 am to 11:00 am  Appointments are required, call [phone]    If you have clinical questions after hours or would like to schedule an appointment,  call the clinic at [phone].

## 2017-09-18 NOTE — PROGRESS NOTES
"  SUBJECTIVE:   Rose Mary Rose is a 52 year old female who presents to clinic today for the following health issues:      Musculoskeletal problem/pain      Duration: 2 years    Description  Location: both legs  When she lays down at night, upper thighs very sore  Symptoms seem to be getting worse  Almost constant    Intensity:  moderate    Accompanying signs and symptoms: none    History  Previous similar problem: no   Previous evaluation:  none    Precipitating or alleviating factors:  Trauma or overuse: YES-  Admits to walking more, trying to lose weight  Aggravating factors include: not really sure, just knows the longer she's up on her feet the more her legs start hurting    Therapies tried and outcome: heat and acetaminophen helpful    Foot pain      Duration: one month    Description (location/character/radiation): right foot- arch pain. There is a lump in her arch that is causing the pain    Intensity:  moderate    Accompanying signs and symptoms: no swelling, no redness    History (similar episodes/previous evaluation): None    Precipitating or alleviating factors: None    Therapies tried and outcome:  Massage, arch supports, new shoes - nothing helps         Problem list and histories reviewed & adjusted, as indicated.  Additional history: as documented    Reviewed and updated as needed this visit by clinical staff  Allergies  Meds       Reviewed and updated as needed this visit by Provider         ROS:  Constitutional, HEENT, cardiovascular, pulmonary, gi and gu systems are negative, except as otherwise noted.      OBJECTIVE:   /64  Pulse 60  Temp 97.9  F (36.6  C) (Oral)  Ht 5' 1.5\" (1.562 m)  Wt 169 lb (76.7 kg)  LMP 04/06/2016  BMI 31.42 kg/m2  Body mass index is 31.42 kg/(m^2).  GENERAL: healthy, alert and no distress  MS: no gross musculoskeletal defects noted, no edema  Foot - right foot - firm, immobile nodule in the arch.      ASSESSMENT/PLAN:       ICD-10-CM    1. Ganglion cyst " of right foot M67.471 ORTHO  REFERRAL - podiatry referral to discuss treatment options.     2. Muscle ache M79.1 Etiology unclear.  Labs to r/o pathology:  TSH with free T4 reflex     CBC with platelets     Comprehensive metabolic panel     Phosphorus     CK total     Vitamin D Deficiency    Discussed weight loss.  Discussed stretching and increase water intake.     3. Family history of colon cancer Z80.0 GASTROENTEROLOGY ADULT REF PROCEDURE ONLY         The risks, benefits and treatment options of prescribed medications or other treatments have been discussed with the patient. The patient verbalized their understanding and should call or follow up if no improvement or if they develop further problems.    REDD Tompkins [facility]

## 2017-09-18 NOTE — NURSING NOTE
"Chief Complaint   Patient presents with     Musculoskeletal Problem       Initial /64  Pulse 60  Temp 97.9  F (36.6  C) (Oral)  Ht 5' 1.5\" (1.562 m)  Wt 169 lb (76.7 kg)  LMP 04/06/2016  BMI 31.42 kg/m2 Estimated body mass index is 31.42 kg/(m^2) as calculated from the following:    Height as of this encounter: 5' 1.5\" (1.562 m).    Weight as of this encounter: 169 lb (76.7 kg).  Medication Reconciliation: complete  "

## 2017-09-19 DIAGNOSIS — R79.89 ELEVATED TSH: Primary | ICD-10-CM

## 2017-09-19 LAB — DEPRECATED CALCIDIOL+CALCIFEROL SERPL-MC: 40 UG/L (ref 20–75)

## 2017-09-19 ASSESSMENT — ASTHMA QUESTIONNAIRES: ACT_TOTALSCORE: 21

## 2017-09-30 DIAGNOSIS — J45.40 MODERATE PERSISTENT ASTHMA WITHOUT COMPLICATION: ICD-10-CM

## 2017-10-02 RX ORDER — ALBUTEROL SULFATE 90 UG/1
AEROSOL, METERED RESPIRATORY (INHALATION)
Qty: 54 G | Refills: 1 | Status: SHIPPED | OUTPATIENT
Start: 2017-10-02 | End: 2017-10-09

## 2017-10-03 ENCOUNTER — MYC MEDICAL ADVICE (OUTPATIENT)
Dept: FAMILY MEDICINE | Facility: CLINIC | Age: 52
End: 2017-10-03

## 2017-10-03 DIAGNOSIS — J45.40 MODERATE PERSISTENT ASTHMA WITHOUT COMPLICATION: ICD-10-CM

## 2017-10-05 ENCOUNTER — OFFICE VISIT (OUTPATIENT)
Dept: PODIATRY | Facility: CLINIC | Age: 52
End: 2017-10-05
Payer: COMMERCIAL

## 2017-10-05 VITALS — BODY MASS INDEX: 31.1 KG/M2 | HEIGHT: 62 IN | WEIGHT: 169 LBS

## 2017-10-05 DIAGNOSIS — D21.21 FIBROMA OF RIGHT FOOT: ICD-10-CM

## 2017-10-05 DIAGNOSIS — M79.671 RIGHT FOOT PAIN: Primary | ICD-10-CM

## 2017-10-05 PROCEDURE — 99203 OFFICE O/P NEW LOW 30 MIN: CPT | Performed by: PODIATRIST

## 2017-10-05 NOTE — PROGRESS NOTES
PATIENT HISTORY:  Rose Mary Rose is a 52 year old female who presents to clinic for a painful right foot .  The patient describes the pain as sharp stabbing.  The patient relates the pain level is moderate.  The patient relates pain is located on the bottom of the right foot.  The patient relates the pain has been present for the past several weeks.  The patient relates pain with ambulation.  The patient has tried different shoes and inserts with little relief.  The patient was sent by Soledad Bauman CMP for consultation on the right foot.       REVIEW OF SYSTEMS:  Constitutional, HEENT, cardiovascular, pulmonary, GI, , musculoskeletal, neuro, skin, endocrine and psych systems are negative, except as otherwise noted.     PAST MEDICAL HISTORY:   Past Medical History:   Diagnosis Date     Abnormal Papanicolaou smear of cervix and cervical HPV 2003    Cryo  (path results?)     Eczema      Human papillomavirus in conditions classified elsewhere and of unspecified site     Genital warts        PAST SURGICAL HISTORY:   Past Surgical History:   Procedure Laterality Date     COLONOSCOPY  12/3/2012    Procedure: COLONOSCOPY;  Colonoscopy;  Surgeon: Darnell Siddiqui MD;  Location: [facility]     ESOPHAGOSCOPY, GASTROSCOPY, DUODENOSCOPY (EGD), COMBINED N/A 5/5/2017    Procedure: COMBINED ESOPHAGOSCOPY, GASTROSCOPY, DUODENOSCOPY (EGD), BIOPSY SINGLE OR MULTIPLE;  Gastroscopy;  Surgeon: Yonny Zambrano MD;  Location: [facility]     NO HISTORY OF SURGERY  8/2007        MEDICATIONS:   Current Outpatient Prescriptions:      VENTOLIN  (90 BASE) MCG/ACT Inhaler, SHAKE WELL, THEN INHALE 2 PUFFS INTO THE LUNGS EVERY 6 HOURS AS NEEDED FOR SHORTNESS OF BREATH/DYSPNEA, Disp: 54 g, Rfl: 1     loratadine (CLARITIN) 10 MG tablet, TAKE ONE TABLET BY MOUTH EVERY DAY, Disp: 90 tablet, Rfl: 1     omeprazole (PRILOSEC) 40 MG capsule, Take 1 capsule (40 mg) by mouth daily Take 30-60 minutes before a meal., Disp: 30 capsule, Rfl: 11      albuterol (PROAIR HFA/PROVENTIL HFA/VENTOLIN HFA) 108 (90 BASE) MCG/ACT Inhaler, Inhale 2 puffs into the lungs every 6 hours as needed for shortness of breath / dyspnea, Disp: 3 Inhaler, Rfl: 3     fluticasone (FLOVENT HFA) 110 MCG/ACT Inhaler, Inhale 2 puffs into the lungs 2 times daily 2 puffs, Disp: 3 Inhaler, Rfl: 3     lisinopril (PRINIVIL/ZESTRIL) 10 MG tablet, Take 1 tablet (10 mg) by mouth daily, Disp: 90 tablet, Rfl: 3     ipratropium - albuterol 0.5 mg/2.5 mg/3 mL (DUONEB) 0.5-2.5 (3) MG/3ML neb solution, Take 1 vial (3 mLs) by nebulization every 6 hours as needed for shortness of breath / dyspnea or wheezing, Disp: 1 Box, Rfl: 3     albuterol (2.5 MG/3ML) 0.083% neb solution, Take 1 vial (2.5 mg) by nebulization every 4 hours as needed for shortness of breath / dyspnea, Disp: 3 Box, Rfl: 3     clobetasol (TEMOVATE) 0.05 % cream, Apply sparingly to affected area twice daily as needed.  Do not apply to face. Wait to fill prescription until patient calls., Disp: 60 g, Rfl: 3     fluticasone (FLONASE) 50 MCG/ACT nasal spray, Spray 2 sprays into both nostrils daily, Disp: 3 Bottle, Rfl: 3     ALLERGIES:    Allergies   Allergen Reactions     Penicillins      rash        SOCIAL HISTORY:   Social History     Social History     Marital status:      Spouse name: N/A     Number of children: 3     Years of education: N/A     Occupational History      [employer]     [employer]     Social History Main Topics     Smoking status: Current Some Day Smoker     Packs/day: 0.25     Years: 25.00     Types: Cigarettes     Last attempt to quit: 3/14/2016     Smokeless tobacco: Never Used      Comment: smoking off and on- few cigarettes every couple days     Alcohol use No      Comment: 1 beer per month, usually none     Drug use: No     Sexual activity: Yes     Partners: Male     Birth control/ protection: Condom      Comment: Vas     Other Topics Concern     Parent/Sibling W/ Cabg, Mi Or  "Angioplasty Before 65f 55m? Yes     Social History Narrative        FAMILY HISTORY:   Family History   Problem Relation Age of Onset     C.A.D. Mother      Hypertension Mother      DIABETES Mother      C.A.D. Maternal Grandmother      Hypertension Maternal Grandmother      CEREBROVASCULAR DISEASE Maternal Grandmother      CANCER Paternal Grandfather      ? type     DIABETES Paternal Grandfather      Adult onset     CANCER Maternal Grandfather      ? renal     DIABETES Father      Hypertension Father      Cancer - colorectal Father      Respiratory Daughter      asthma     HEART DISEASE Brother      double bypass     C.A.D. Brother      CANCER Brother      testicular ca     Hypertension Brother      Breast Cancer No family hx of         EXAM:Vitals: Ht 1.562 m (5' 1.5\")  Wt 76.7 kg (169 lb)  LMP 04/06/2016  BMI 31.42 kg/m2  BMI= Body mass index is 31.42 kg/(m^2).    Weight management plan: Patient was referred to their PCP to discuss a diet and exercise plan.    General appearance: Patient is alert and fully cooperative with history & exam.  No sign of distress is noted during the visit.     Psychiatric: Affect is pleasant & appropriate.  Patient appears motivated to improve health.     Respiratory: Breathing is regular & unlabored while sitting.     HEENT: Hearing is intact to spoken word.  Speech is clear.  No gross evidence of visual impairment that would impact ambulation.     Dermatologic: Skin is intact to both lower extremities without significant lesions, rash or abrasion.  No paronychia or evidence of soft tissue infection is noted.     Vascular: DP & PT pulses are intact & regular bilaterally.  No significant edema or varicosities noted.  CFT and skin temperature is normal to both lower extremities.     Neurologic: Lower extremity sensation is intact to light touch.  No evidence of weakness or contracture in the lower extremities.  No evidence of neuropathy.     Musculoskeletal: Patient is ambulatory " without assistive device or brace.  No gross ankle deformity noted.  No foot or ankle joint effusion is noted.  Noted soft firm soft tissue mass on the plantar aspect of the arch of the right foot. The skin appears to move freely around the soft tissue mass. No surrounding erythema noted.       ASSESSMENT / PLAN:     ICD-10-CM    1. Right foot pain M79.671 X-ray rt Foot G/E 3 vws*   2. Fibroma of right foot D21.21        I have explained to Rose Mary  about the conditions.  We discussed the nature of the condition as well as the treatment plan and expected length of recovery.  At this time, The patient was referred to [facility] for custom orthotics that will aid in offloading the tension forces to the soft tissues and prevent further inflammation.        Disclaimer: This note consists of symbols derived from keyboarding, dictation and/or voice recognition software. As a result, there may be errors in the script that have gone undetected. Please consider this when interpreting information found in this chart.       SHANELL Webber D.P.M., F.A.C.F.A.S.

## 2017-10-05 NOTE — LETTER
10/5/2017         RE: Rose Mary Rose  [address] [address] [facility] [place] [address]        Dear Colleague,    Thank you for referring your patient, Rose Mary Rose, to the [facility]. Please see a copy of my visit note below.    PATIENT HISTORY:  Rose Mary Rose is a 52 year old female who presents to clinic for a painful right foot .  The patient describes the pain as sharp stabbing.  The patient relates the pain level is moderate.  The patient relates pain is located on the bottom of the right foot.  The patient relates the pain has been present for the past several weeks.  The patient relates pain with ambulation.  The patient has tried different shoes and inserts with little relief.  The patient was sent by Soledad Bauman CMP for consultation on the right foot.       REVIEW OF SYSTEMS:  Constitutional, HEENT, cardiovascular, pulmonary, GI, , musculoskeletal, neuro, skin, endocrine and psych systems are negative, except as otherwise noted.     PAST MEDICAL HISTORY:   Past Medical History:   Diagnosis Date     Abnormal Papanicolaou smear of cervix and cervical HPV 2003    Cryo  (path results?)     Eczema      Human papillomavirus in conditions classified elsewhere and of unspecified site     Genital warts        PAST SURGICAL HISTORY:   Past Surgical History:   Procedure Laterality Date     COLONOSCOPY  12/3/2012    Procedure: COLONOSCOPY;  Colonoscopy;  Surgeon: Darnell Siddiqui MD;  Location: [facility]     ESOPHAGOSCOPY, GASTROSCOPY, DUODENOSCOPY (EGD), COMBINED N/A 5/5/2017    Procedure: COMBINED ESOPHAGOSCOPY, GASTROSCOPY, DUODENOSCOPY (EGD), BIOPSY SINGLE OR MULTIPLE;  Gastroscopy;  Surgeon: Yonny Zambrano MD;  Location: [facility]     NO HISTORY OF SURGERY  8/2007        MEDICATIONS:   Current Outpatient Prescriptions:      VENTOLIN  (90 BASE) MCG/ACT Inhaler, SHAKE WELL, THEN INHALE 2 PUFFS INTO THE LUNGS EVERY 6 HOURS AS NEEDED FOR SHORTNESS OF  BREATH/DYSPNEA, Disp: 54 g, Rfl: 1     loratadine (CLARITIN) 10 MG tablet, TAKE ONE TABLET BY MOUTH EVERY DAY, Disp: 90 tablet, Rfl: 1     omeprazole (PRILOSEC) 40 MG capsule, Take 1 capsule (40 mg) by mouth daily Take 30-60 minutes before a meal., Disp: 30 capsule, Rfl: 11     albuterol (PROAIR HFA/PROVENTIL HFA/VENTOLIN HFA) 108 (90 BASE) MCG/ACT Inhaler, Inhale 2 puffs into the lungs every 6 hours as needed for shortness of breath / dyspnea, Disp: 3 Inhaler, Rfl: 3     fluticasone (FLOVENT HFA) 110 MCG/ACT Inhaler, Inhale 2 puffs into the lungs 2 times daily 2 puffs, Disp: 3 Inhaler, Rfl: 3     lisinopril (PRINIVIL/ZESTRIL) 10 MG tablet, Take 1 tablet (10 mg) by mouth daily, Disp: 90 tablet, Rfl: 3     ipratropium - albuterol 0.5 mg/2.5 mg/3 mL (DUONEB) 0.5-2.5 (3) MG/3ML neb solution, Take 1 vial (3 mLs) by nebulization every 6 hours as needed for shortness of breath / dyspnea or wheezing, Disp: 1 Box, Rfl: 3     albuterol (2.5 MG/3ML) 0.083% neb solution, Take 1 vial (2.5 mg) by nebulization every 4 hours as needed for shortness of breath / dyspnea, Disp: 3 Box, Rfl: 3     clobetasol (TEMOVATE) 0.05 % cream, Apply sparingly to affected area twice daily as needed.  Do not apply to face. Wait to fill prescription until patient calls., Disp: 60 g, Rfl: 3     fluticasone (FLONASE) 50 MCG/ACT nasal spray, Spray 2 sprays into both nostrils daily, Disp: 3 Bottle, Rfl: 3     ALLERGIES:    Allergies   Allergen Reactions     Penicillins      rash        SOCIAL HISTORY:   Social History     Social History     Marital status:      Spouse name: N/A     Number of children: 3     Years of education: N/A     Occupational History      [employer]     [employer] service     Social History Main Topics     Smoking status: Current Some Day Smoker     Packs/day: 0.25     Years: 25.00     Types: Cigarettes     Last attempt to quit: 3/14/2016     Smokeless tobacco: Never Used      Comment: smoking off and on-  "few cigarettes every couple days     Alcohol use No      Comment: 1 beer per month, usually none     Drug use: No     Sexual activity: Yes     Partners: Male     Birth control/ protection: Condom      Comment: Vas     Other Topics Concern     Parent/Sibling W/ Cabg, Mi Or Angioplasty Before 65f 55m? Yes     Social History Narrative        FAMILY HISTORY:   Family History   Problem Relation Age of Onset     C.A.D. Mother      Hypertension Mother      DIABETES Mother      C.A.D. Maternal Grandmother      Hypertension Maternal Grandmother      CEREBROVASCULAR DISEASE Maternal Grandmother      CANCER Paternal Grandfather      ? type     DIABETES Paternal Grandfather      Adult onset     CANCER Maternal Grandfather      ? renal     DIABETES Father      Hypertension Father      Cancer - colorectal Father      Respiratory Daughter      asthma     HEART DISEASE Brother      double bypass     C.A.D. Brother      CANCER Brother      testicular ca     Hypertension Brother      Breast Cancer No family hx of         EXAM:Vitals: Ht 1.562 m (5' 1.5\")  Wt 76.7 kg (169 lb)  LMP 04/06/2016  BMI 31.42 kg/m2  BMI= Body mass index is 31.42 kg/(m^2).    Weight management plan: Patient was referred to their PCP to discuss a diet and exercise plan.    General appearance: Patient is alert and fully cooperative with history & exam.  No sign of distress is noted during the visit.     Psychiatric: Affect is pleasant & appropriate.  Patient appears motivated to improve health.     Respiratory: Breathing is regular & unlabored while sitting.     HEENT: Hearing is intact to spoken word.  Speech is clear.  No gross evidence of visual impairment that would impact ambulation.     Dermatologic: Skin is intact to both lower extremities without significant lesions, rash or abrasion.  No paronychia or evidence of soft tissue infection is noted.     Vascular: DP & PT pulses are intact & regular bilaterally.  No significant edema or varicosities " noted.  CFT and skin temperature is normal to both lower extremities.     Neurologic: Lower extremity sensation is intact to light touch.  No evidence of weakness or contracture in the lower extremities.  No evidence of neuropathy.     Musculoskeletal: Patient is ambulatory without assistive device or brace.  No gross ankle deformity noted.  No foot or ankle joint effusion is noted.  Noted soft firm soft tissue mass on the plantar aspect of the arch of the right foot. The skin appears to move freely around the soft tissue mass. No surrounding erythema noted.       ASSESSMENT / PLAN:     ICD-10-CM    1. Right foot pain M79.671 X-ray rt Foot G/E 3 vws*   2. Fibroma of right foot D21.21        I have explained to Rose Mary  about the conditions.  We discussed the nature of the condition as well as the treatment plan and expected length of recovery.  At this time, The patient was referred to [facility] for custom orthotics that will aid in offloading the tension forces to the soft tissues and prevent further inflammation.        Disclaimer: This note consists of symbols derived from keyboarding, dictation and/or voice recognition software. As a result, there may be errors in the script that have gone undetected. Please consider this when interpreting information found in this chart.       DIANE [email or web address]., F.A.C.F.A.S.        Again, thank you for allowing me to participate in the care of your patient.        Sincerely,        Sergio Webber DPM

## 2017-10-05 NOTE — MR AVS SNAPSHOT
After Visit Summary   10/5/2017    Rose Mary Rose    MRN: [id number]           Patient Information     Date Of Birth          1965        Visit Information        Provider Department      10/5/2017 4:00 PM Sergio Webber DPM [facility]        Today's Diagnoses     Right foot pain    -  1    Fibroma of right foot          Care Instructions    Initial musculoskeletal treatment recommendation:    1.  Stretch the calf muscles as instructed once an hour.  2.  Ice the injured area in the evening; 20 min on/off.  3.  Take antiinflammatory medication as directed.  4.  Massage the soft tissues around the injured area in the morning to loosen the tissue  5.  Wear supportive foot wear and/or arch supports (rigid not cushion).      If no improvement in symptoms within four to six weeks, return to clinic for reevaluation.            Follow-ups after your visit        Additional Services     ORTHOTICS REFERRAL       Please be aware that coverage of these services is subject to the terms and limitations of your health insurance plan.  Call member services at your health plan with any benefit or coverage questions.      Please bring the following to your appointment:    >>   Any x-rays, CTs or MRIs which have been performed.  Contact the facility where they were done to arrange for  prior to your scheduled appointment.  Any new CT, MRI or other procedures ordered by your specialist must be performed at a [facility] facility or coordinated by your clinic's referral office.    >>   List of current medications   >>   This referral request   >>   Any documents/labs given to you for this referral    ==This Referral PRINTS in the [facility] (ORTHOTICS & PROSTHETICS) Central scheduling office ==     The [facility] Central Scheduling staff will contact patient to arrange appointments. Central Scheduling Phone #:  [place]  [phone]      Orthotics: Foot Orthotics                  Follow-up notes from your care team     Return in about 4 weeks (around 11/2/2017), or if symptoms worsen or fail to improve.      Your next 10 appointments already scheduled     Oct 07, 2017  8:00 AM T   [facility]   [facility]    [address]  [facility] [place] [address]   [phone]           Patient must bring picture ID. Patient should be prepared to give a urine specimen  Please do not eat 10-12 hours before your appointment if you are coming in fasting for labs on lipids, cholesterol, or glucose (sugar). Pregnant women should follow their Care Team instructions. Water with medications is okay. Do not drink coffee or other fluids. If you have concerns about taking  your medications, please ask at office or if scheduling via [employer], send a message by clicking on Secure Messaging, Message Your Care Team.              Who to contact     If you have questions or need follow up information about today's clinic visit or your schedule please contact [facility] directly at [phone].  Normal or non-critical lab and imaging results will be communicated to you by Memoir[employer], letter or phone within 4 business days after the clinic has received the results. If you do not hear from us within 7 days, please contact the clinic through [employer] or phone. If you have a critical or abnormal lab result, we will notify you by phone as soon as possible.  Submit refill requests through [employer] or call your pharmacy and they will forward the refill request to us. Please allow 3 business days for your refill to be completed.          Additional Information About Your Visit        [employer] Information     [employer] gives you secure access to your electronic health record. If you see a primary care provider, you can also send messages to your care team and make appointments. If you have questions, please  "call your primary care clinic.  If you do not have a primary care provider, please call [phone] and they will assist you.        Care EveryWhere ID     This is your Care EveryWhere ID. This could be used by other organizations to access your [facility] medical records  [id number]        Your Vitals Were     Height Last Period BMI (Body Mass Index)             1.562 m (5' 1.5\") 04/06/2016 31.42 kg/m2          Blood Pressure from Last 3 Encounters:   09/18/17 131/64   06/09/17 117/70   05/05/17 100/59    Weight from Last 3 Encounters:   10/05/17 76.7 kg (169 lb)   09/18/17 76.7 kg (169 lb)   06/09/17 74.6 kg (164 lb 8 oz)              We Performed the Following     ORTHOTICS REFERRAL        Primary Care Provider Office Phone # Fax #    Soledad Gonzalez [facility] Bauman, APRN [facility] [phone] [address] [facility] [address]        Equal Access to Services     EDDA IQBAL : Hadii aad ku hadasho Soomaali, waaxda luqadaha, qaybta kaalmada adeegyada, patrick zhou hayjanis allen . So [facility] [phone].    ATENCIÓN: Si habla español, tiene a jones disposición servicios gratuitos de asistencia lingüística. Llame al [phone].    We comply with applicable federal civil rights laws and [place] laws. We do not discriminate on the basis of race, color, national origin, age, disability, sex, sexual orientation, or gender identity.            Thank you!     Thank you for choosing [facility]  for your care. Our goal is always to provide you with excellent care. Hearing back from our patients is one way we can continue to improve our services. Please take a few minutes to complete the written survey that you may receive in the mail after your visit with us. Thank you!             Your Updated Medication List - Protect others around you: Learn how to safely use, store and throw away your medicines at www.disposemymeds.org.          This list is accurate as of: " 10/5/17  4:20 PM.  Always use your most recent med list.                   Brand Name Dispense Instructions for use Diagnosis    * albuterol (2.5 MG/3ML) 0.083% neb solution     3 Box    Take 1 vial (2.5 mg) by nebulization every 4 hours as needed for shortness of breath / dyspnea    Moderate persistent asthma without complication       * albuterol 108 (90 BASE) MCG/ACT Inhaler    PROAIR HFA/PROVENTIL HFA/VENTOLIN HFA    3 Inhaler    Inhale 2 puffs into the lungs every 6 hours as needed for shortness of breath / dyspnea    Moderate persistent asthma without complication       * VENTOLIN  (90 BASE) MCG/ACT Inhaler   Generic drug:  albuterol     54 g    SHAKE WELL, THEN INHALE 2 PUFFS INTO THE LUNGS EVERY 6 HOURS AS NEEDED FOR SHORTNESS OF BREATH/DYSPNEA    Moderate persistent asthma without complication       clobetasol 0.05 % cream    TEMOVATE    60 g    Apply sparingly to affected area twice daily as needed.  Do not apply to face. Wait to fill prescription until patient calls.    Dyshidrotic eczema       fluticasone 110 MCG/ACT Inhaler    FLOVENT HFA    3 Inhaler    Inhale 2 puffs into the lungs 2 times daily 2 puffs    Moderate persistent asthma without complication       fluticasone 50 MCG/ACT spray    FLONASE    3 Bottle    Spray 2 sprays into both nostrils daily    Other acute sinusitis       ipratropium - albuterol 0.5 mg/2.5 mg/3 mL 0.5-2.5 (3) MG/3ML neb solution    DUONEB    1 Box    Take 1 vial (3 mLs) by nebulization every 6 hours as needed for shortness of breath / dyspnea or wheezing    Moderate persistent asthma without complication       lisinopril 10 MG tablet    PRINIVIL/ZESTRIL    90 tablet    Take 1 tablet (10 mg) by mouth daily    Essential hypertension with goal blood pressure less than 140/90       loratadine 10 MG tablet    CLARITIN    90 tablet    TAKE ONE TABLET BY MOUTH EVERY DAY    Moderate persistent asthma without complication       omeprazole 40 MG capsule    priLOSEC    30  capsule    Take 1 capsule (40 mg) by mouth daily Take 30-60 minutes before a meal.    Gastroesophageal reflux disease without esophagitis       * Notice:  This list has 3 medication(s) that are the same as other medications prescribed for you. Read the directions carefully, and ask your doctor or other care provider to review them with you.

## 2017-10-07 DIAGNOSIS — R79.89 ELEVATED TSH: ICD-10-CM

## 2017-10-07 PROCEDURE — 86376 MICROSOMAL ANTIBODY EACH: CPT | Performed by: NURSE PRACTITIONER

## 2017-10-07 PROCEDURE — 36415 COLL VENOUS BLD VENIPUNCTURE: CPT | Performed by: NURSE PRACTITIONER

## 2017-10-07 PROCEDURE — 86800 THYROGLOBULIN ANTIBODY: CPT | Performed by: NURSE PRACTITIONER

## 2017-10-09 LAB
THYROGLOB AB SERPL IA-ACNC: 86 IU/ML (ref 0–40)
THYROPEROXIDASE AB SERPL-ACNC: <10 IU/ML

## 2017-10-09 RX ORDER — ALBUTEROL SULFATE 90 UG/1
AEROSOL, METERED RESPIRATORY (INHALATION)
Qty: 54 G | Refills: 3 | Status: SHIPPED | OUTPATIENT
Start: 2017-10-09 | End: 2018-03-26

## 2017-10-09 NOTE — TELEPHONE ENCOUNTER
Soledad, please see her Uni[employer] note/ request for Rx's. Dee Lange RNC      good afternoon [employer] my health insurance is sending over a request for refills for my ventolin inhaler, also  they are requesting a new inhaler for my flovent that i will not have to pay for, it is called Qvair, is this something that   i can use in stead of flovent or is there some other one that you can change me to? please let me know , i can not afford the   $80 dollars for just one flovent, hopefully it can be figured out. thanks again

## 2017-10-24 ENCOUNTER — OFFICE VISIT (OUTPATIENT)
Dept: FAMILY MEDICINE | Facility: CLINIC | Age: 52
End: 2017-10-24
Payer: COMMERCIAL

## 2017-10-24 VITALS
BODY MASS INDEX: 30.51 KG/M2 | HEART RATE: 67 BPM | WEIGHT: 165.8 LBS | SYSTOLIC BLOOD PRESSURE: 120 MMHG | DIASTOLIC BLOOD PRESSURE: 71 MMHG | TEMPERATURE: 98 F | HEIGHT: 62 IN

## 2017-10-24 DIAGNOSIS — E03.9 ACQUIRED HYPOTHYROIDISM: Primary | ICD-10-CM

## 2017-10-24 PROCEDURE — 99214 OFFICE O/P EST MOD 30 MIN: CPT | Performed by: NURSE PRACTITIONER

## 2017-10-24 RX ORDER — LEVOTHYROXINE SODIUM 25 UG/1
25 TABLET ORAL DAILY
Qty: 30 TABLET | Refills: 1 | Status: SHIPPED | OUTPATIENT
Start: 2017-10-24 | End: 2017-12-19

## 2017-10-24 NOTE — NURSING NOTE
"Chief Complaint   Patient presents with     Lab Result Notice     Thyroid Lab Results        Initial /71 (BP Location: Left arm, Patient Position: Chair, Cuff Size: Adult Large)  Pulse 67  Temp 98  F (36.7  C) (Tympanic)  Ht 5' 1.5\" (1.562 m)  Wt 165 lb 12.8 oz (75.2 kg)  LMP 04/06/2016  BMI 30.82 kg/m2 Estimated body mass index is 30.82 kg/(m^2) as calculated from the following:    Height as of this encounter: 5' 1.5\" (1.562 m).    Weight as of this encounter: 165 lb 12.8 oz (75.2 kg).  Medication Reconciliation: complete    "

## 2017-10-24 NOTE — MR AVS SNAPSHOT
After Visit Summary   10/24/2017    Rose Mary Rose    MRN: [id number]           Patient Information     Date Of Birth          1965        Visit Information        Provider Department      10/24/2017 3:40 PM Soledad Bauman APRN CNP [facility]        Today's Diagnoses     Acquired hypothyroidism    -  1      Care Instructions    Start levothyroxine 25 mcg daily.  Take in the morning, 30 minutes prior to breakfast.    Lab recheck in 6 weeks.        Thank you for choosing [facility].  You may be receiving a survey in the mail from [facility] regarding your visit today.  Please take a few minutes to complete and return the survey to let us know how we are doing.      If you have questions or concerns, please contact us via [employer] or you can contact your care team at [phone].    Our Clinic hours are:  Monday 6:40 am  to 7:00 pm  Tuesday -Friday 6:40 am to 5:00 pm    The [place] outpatient lab hours are:  Monday - Friday 6:10 am to 4:45 pm  Saturdays 7:00 am to 11:00 am  Appointments are required, call [phone]    If you have clinical questions after hours or would like to schedule an appointment,  call the clinic at [phone].            Follow-ups after your visit        Your next 10 appointments already scheduled     Oct 24, 2017  3:40 PM CDT   MyChart Long with REDD Martinez CNP   [facility] ([facility])    [address] [id number] [phone]              Future tests that were ordered for you today     Open Future Orders        Priority Expected Expires Ordered    **TSH with free T4 reflex FUTURE anytime Routine 10/24/2017 10/24/2018 10/24/2017            Who to contact     If you have questions or need follow up information about today's clinic visit or your schedule please contact [facility] directly at [phone].  Normal or non-critical lab and imaging  "results will be communicated to you by MyChart, letter or phone within 4 business days after the clinic has received the results. If you do not hear from us within 7 days, please contact the clinic through [employer] or phone. If you have a critical or abnormal lab result, we will notify you by phone as soon as possible.  Submit refill requests through [employer] or call your pharmacy and they will forward the refill request to us. Please allow 3 business days for your refill to be completed.          Additional Information About Your Visit        [employer] Information     [employer] gives you secure access to your electronic health record. If you see a primary care provider, you can also send messages to your care team and make appointments. If you have questions, please call your primary care clinic.  If you do not have a primary care provider, please call [phone] and they will assist you.        Care EveryWhere ID     This is your Care EveryWhere ID. This could be used by other organizations to access your [place] medical records  [id number]        Your Vitals Were     Pulse Temperature Height Last Period BMI (Body Mass Index)       67 98  F (36.7  C) (Tympanic) 5' 1.5\" (1.562 m) 04/06/2016 30.82 kg/m2        Blood Pressure from Last 3 Encounters:   10/24/17 120/71   09/18/17 131/64   06/09/17 117/70    Weight from Last 3 Encounters:   10/24/17 165 lb 12.8 oz (75.2 kg)   10/05/17 169 lb (76.7 kg)   09/18/17 169 lb (76.7 kg)                 Today's Medication Changes          These changes are accurate as of: 10/24/17  3:37 PM.  If you have any questions, ask your nurse or doctor.               Start taking these medicines.        Dose/Directions    levothyroxine 25 MCG tablet   Commonly known as:  SYNTHROID/LEVOTHROID   Used for:  Acquired hypothyroidism   Started by:  Soledad Bauman APRN CNP        Dose:  25 mcg   Take 1 tablet (25 mcg) by mouth daily   Quantity:  30 tablet   Refills:  1          "   Where to get your medicines      These medications were sent to [facility] [place] - [address]  [address] [address]     Phone:  [phone]     levothyroxine 25 MCG tablet                Primary Care Provider Office Phone # Fax #    REDD Martinez [phone]       [address] [address]        Equal Access to Services     EDDA IQBAL : Hadii aad ku hadasho Soomaali, waaxda luqadaha, qaybta kaalmada adeegyada, waxay idiin hayaan adeeg kharash la'janis . So [facility] [phone].    ATENCIÓN: Si habla español, tiene a jones disposición servicios gratuitos de asistencia lingüística. [facility] [phone].    We comply with applicable federal civil rights laws and [place] laws. We do not discriminate on the basis of race, color, national origin, age, disability, sex, sexual orientation, or gender identity.            Thank you!     Thank you for choosing [facility]  for your care. Our goal is always to provide you with excellent care. Hearing back from our patients is one way we can continue to improve our services. Please take a few minutes to complete the written survey that you may receive in the mail after your visit with us. Thank you!             Your Updated Medication List - Protect others around you: Learn how to safely use, store and throw away your medicines at www.disposemymeds.org.          This list is accurate as of: 10/24/17  3:37 PM.  Always use your most recent med list.                   Brand Name Dispense Instructions for use Diagnosis    * albuterol (2.5 MG/3ML) 0.083% neb solution     3 Box    Take 1 vial (2.5 mg) by nebulization every 4 hours as needed for shortness of breath / dyspnea    Moderate persistent asthma without complication       * albuterol 108 (90 BASE) MCG/ACT Inhaler    VENTOLIN HFA    54 g    SHAKE WELL, THEN INHALE 2 PUFFS INTO THE LUNGS EVERY 6 HOURS AS NEEDED FOR  SHORTNESS OF BREATH/DYSPNEA    Moderate persistent asthma without complication       beclomethasone 80 MCG/ACT Inhaler    QVAR    3 Inhaler    Inhale 2 puffs into the lungs 2 times daily    Moderate persistent asthma without complication       clobetasol 0.05 % cream    TEMOVATE    60 g    Apply sparingly to affected area twice daily as needed.  Do not apply to face. Wait to fill prescription until patient calls.    Dyshidrotic eczema       fluticasone 50 MCG/ACT spray    FLONASE    3 Bottle    Spray 2 sprays into both nostrils daily    Other acute sinusitis       ipratropium - albuterol 0.5 mg/2.5 mg/3 mL 0.5-2.5 (3) MG/3ML neb solution    DUONEB    1 Box    Take 1 vial (3 mLs) by nebulization every 6 hours as needed for shortness of breath / dyspnea or wheezing    Moderate persistent asthma without complication       levothyroxine 25 MCG tablet    SYNTHROID/LEVOTHROID    30 tablet    Take 1 tablet (25 mcg) by mouth daily    Acquired hypothyroidism       lisinopril 10 MG tablet    PRINIVIL/ZESTRIL    90 tablet    Take 1 tablet (10 mg) by mouth daily    Essential hypertension with goal blood pressure less than 140/90       loratadine 10 MG tablet    CLARITIN    90 tablet    TAKE ONE TABLET BY MOUTH EVERY DAY    Moderate persistent asthma without complication       omeprazole 40 MG capsule    priLOSEC    30 capsule    Take 1 capsule (40 mg) by mouth daily Take 30-60 minutes before a meal.    Gastroesophageal reflux disease without esophagitis       * Notice:  This list has 2 medication(s) that are the same as other medications prescribed for you. Read the directions carefully, and ask your doctor or other care provider to review them with you.

## 2017-10-24 NOTE — PATIENT INSTRUCTIONS
Start levothyroxine 25 mcg daily.  Take in the morning, 30 minutes prior to breakfast.    Lab recheck in 6 weeks.        Thank you for choosing [facility].  You may be receiving a survey in the mail from [facility] regarding your visit today.  Please take a few minutes to complete and return the survey to let us know how we are doing.      If you have questions or concerns, please contact us via [employer] or you can contact your care team at [phone].    Our Clinic hours are:  Monday 6:40 am  to 7:00 pm  Tuesday -Friday 6:40 am to 5:00 pm    The [place] outpatient lab hours are:  Monday - Friday 6:10 am to 4:45 pm  Saturdays 7:00 am to 11:00 am  Appointments are required, call [phone]    If you have clinical questions after hours or would like to schedule an appointment,  call the clinic at [phone].

## 2017-10-24 NOTE — PROGRESS NOTES
"  SUBJECTIVE:   Rose Mary Rose is a 52 year old female who presents to clinic today for the following health issues:  Chief Complaint   Patient presents with     Lab Result Notice     Thyroid Lab Results      TSH   Date Value Ref Range Status   09/18/2017 11.88 (H) 0.40 - 4.00 mU/L Final   ]  Concern - Patient is here to discuss thyroid lab test results .   Component      Latest Ref Rng & Units 10/7/2017   Thyroglobulin Antibody      <40 IU/mL 86 (H)   Thyroid Peroxidase Antibody      <35 IU/mL <10       Patient reports weight gain and difficulty losing weight.  Frequent heartburn.  Heat intolerance.  No constipation or diarrhea.  No depression or anxiety.      Problem list and histories reviewed & adjusted, as indicated.  Additional history: as documented      Reviewed and updated as needed this visit by clinical staff  Tobacco  Allergies  Meds  Med Hx  Surg Hx  Fam Hx  Soc Hx      Reviewed and updated as needed this visit by Provider         ROS:  Constitutional, HEENT, cardiovascular, pulmonary, gi and gu systems are negative, except as otherwise noted.      OBJECTIVE:   /71 (BP Location: Left arm, Patient Position: Chair, Cuff Size: Adult Large)  Pulse 67  Temp 98  F (36.7  C) (Tympanic)  Ht 5' 1.5\" (1.562 m)  Wt 165 lb 12.8 oz (75.2 kg)  LMP 04/06/2016  BMI 30.82 kg/m2  Body mass index is 30.82 kg/(m^2).  GENERAL: healthy, alert and no distress  NECK: no adenopathy, no asymmetry, masses, or scars and thyroid normal to palpation  RESP: lungs clear to auscultation - no rales, rhonchi or wheezes  CV: regular rate and rhythm, normal S1 S2, no S3 or S4, no murmur, click or rub, no peripheral edema and peripheral pulses strong      ASSESSMENT/PLAN:       ICD-10-CM    1. Acquired hypothyroidism E03.9 levothyroxine (SYNTHROID/LEVOTHROID) 25 MCG tablet     **TSH with free T4 reflex FUTURE anytime     New diagnosis.    Patient Instructions   Start levothyroxine 25 mcg daily.  Take in the morning, " 30 minutes prior to breakfast.    Lab recheck in 6 weeks.      The risks, benefits and treatment options of prescribed medications or other treatments have been discussed with the patient. The patient verbalized their understanding and should call or follow up if no improvement or if they develop further problems.    REDD Tompkins [facility]

## 2017-10-31 PROBLEM — E03.9 ACQUIRED HYPOTHYROIDISM: Status: ACTIVE | Noted: 2017-10-31

## 2017-11-28 DIAGNOSIS — L30.1 DYSHIDROTIC ECZEMA: ICD-10-CM

## 2017-11-28 RX ORDER — CLOBETASOL PROPIONATE 0.5 MG/G
CREAM TOPICAL
Qty: 60 G | Refills: 0 | Status: SHIPPED | OUTPATIENT
Start: 2017-11-28 | End: 2019-01-22

## 2017-11-28 NOTE — LETTER
[facility] [place]  [address] [address]  Phone: [phone]    2017    Rose Mary JOAN Milton                                                                                                           [address] [facility] [place] [address]            Dear Ms. Rose,    We are concerned about your health care.  We recently provided you with a medication refill.  Many medications require routine follow-up with your Dermatology Provider.     At this time we ask that: You schedule a routine office visit with your Dermatology Provider to follow your Eczema. Your prescription has  as it has been 1 year since you were last seen.    Your prescription: Has been refilled so you may have time for the above noted follow-up.  Please be seen prior to needing your next refill of medication.     We are currently booking appointments 7 to 8 weeks in advance.    Thank you,      Nupur BANERJEE / samir

## 2017-11-28 NOTE — TELEPHONE ENCOUNTER
1 year since seen. Needs appointment. Letter sent and note sent to pharmacy as well. Lacy Mock RN

## 2017-11-29 ENCOUNTER — MYC MEDICAL ADVICE (OUTPATIENT)
Dept: FAMILY MEDICINE | Facility: CLINIC | Age: 52
End: 2017-11-29

## 2017-12-09 DIAGNOSIS — E03.9 ACQUIRED HYPOTHYROIDISM: ICD-10-CM

## 2017-12-09 LAB
T4 FREE SERPL-MCNC: 1 NG/DL (ref 0.76–1.46)
TSH SERPL DL<=0.005 MIU/L-ACNC: 4.53 MU/L (ref 0.4–4)

## 2017-12-09 PROCEDURE — 84439 ASSAY OF FREE THYROXINE: CPT | Performed by: NURSE PRACTITIONER

## 2017-12-09 PROCEDURE — 84443 ASSAY THYROID STIM HORMONE: CPT | Performed by: NURSE PRACTITIONER

## 2017-12-09 PROCEDURE — 36415 COLL VENOUS BLD VENIPUNCTURE: CPT | Performed by: NURSE PRACTITIONER

## 2017-12-19 DIAGNOSIS — E03.9 ACQUIRED HYPOTHYROIDISM: ICD-10-CM

## 2017-12-20 RX ORDER — LEVOTHYROXINE SODIUM 25 UG/1
TABLET ORAL
Qty: 90 TABLET | Refills: 3 | Status: SHIPPED | OUTPATIENT
Start: 2017-12-20 | End: 2017-12-30

## 2017-12-20 NOTE — TELEPHONE ENCOUNTER
Routing refill request to provider for review/approval because:  Labs out of range:  TSH 4.53    Thank you  Keyona ZENG RN

## 2017-12-21 ENCOUNTER — MYC REFILL (OUTPATIENT)
Dept: FAMILY MEDICINE | Facility: CLINIC | Age: 52
End: 2017-12-21

## 2017-12-21 DIAGNOSIS — E03.9 ACQUIRED HYPOTHYROIDISM: ICD-10-CM

## 2017-12-21 RX ORDER — LEVOTHYROXINE SODIUM 25 UG/1
25 TABLET ORAL DAILY
Qty: 90 TABLET | Refills: 3 | Status: CANCELLED | OUTPATIENT
Start: 2017-12-21

## 2017-12-21 NOTE — TELEPHONE ENCOUNTER
Message from MyChart:  Original authorizing provider: REDD Tompkins CNP would like a refill of the following medications:  levothyroxine (SYNTHROID/LEVOTHROID) 25 MCG tablet [REDD Tompkins CNP]    Preferred pharmacy: [facility] - [place] - [address]    Comment:

## 2017-12-30 ENCOUNTER — MYC REFILL (OUTPATIENT)
Dept: FAMILY MEDICINE | Facility: CLINIC | Age: 52
End: 2017-12-30

## 2017-12-30 DIAGNOSIS — E03.9 ACQUIRED HYPOTHYROIDISM: ICD-10-CM

## 2018-01-02 ENCOUNTER — OFFICE VISIT (OUTPATIENT)
Dept: FAMILY MEDICINE | Facility: CLINIC | Age: 53
End: 2018-01-02
Payer: COMMERCIAL

## 2018-01-02 VITALS
WEIGHT: 161.9 LBS | DIASTOLIC BLOOD PRESSURE: 75 MMHG | SYSTOLIC BLOOD PRESSURE: 150 MMHG | BODY MASS INDEX: 30.1 KG/M2 | TEMPERATURE: 97.9 F | HEART RATE: 84 BPM | OXYGEN SATURATION: 96 %

## 2018-01-02 DIAGNOSIS — J45.41 MODERATE PERSISTENT ASTHMA WITH ACUTE EXACERBATION: Primary | ICD-10-CM

## 2018-01-02 PROCEDURE — 99214 OFFICE O/P EST MOD 30 MIN: CPT | Performed by: NURSE PRACTITIONER

## 2018-01-02 RX ORDER — AZITHROMYCIN 250 MG/1
TABLET, FILM COATED ORAL
Qty: 6 TABLET | Refills: 0 | Status: SHIPPED | OUTPATIENT
Start: 2018-01-02 | End: 2018-01-09

## 2018-01-02 RX ORDER — LEVOTHYROXINE SODIUM 25 UG/1
25 TABLET ORAL DAILY
Qty: 90 TABLET | Refills: 3 | Status: SHIPPED | OUTPATIENT
Start: 2018-01-02 | End: 2018-03-26

## 2018-01-02 RX ORDER — PREDNISONE 20 MG/1
40 TABLET ORAL DAILY
Qty: 10 TABLET | Refills: 0 | Status: SHIPPED | OUTPATIENT
Start: 2018-01-02 | End: 2018-01-07

## 2018-01-02 NOTE — NURSING NOTE
"Chief Complaint   Patient presents with     Cough     productive cough and sinus congestion for about a week and a half.        Initial /75  Pulse 84  Temp 97.9  F (36.6  C) (Tympanic)  Wt 161 lb 14.4 oz (73.4 kg)  LMP 04/06/2016  SpO2 96%  BMI 30.1 kg/m2 Estimated body mass index is 30.1 kg/(m^2) as calculated from the following:    Height as of 10/24/17: 5' 1.5\" (1.562 m).    Weight as of this encounter: 161 lb 14.4 oz (73.4 kg).  Medication Reconciliation: complete   Vielka Li CMA    "

## 2018-01-02 NOTE — PROGRESS NOTES
SUBJECTIVE:   Rose Mary Rose is a 52 year old female who presents to clinic today for the following health issues:      Chief Complaint   Patient presents with     Cough       productive cough and sinus congestion for about a week and a half.       ENT Symptoms           Symptoms: cc Present Absent Comment   Fever/Chills   x   sweats   Fatigue   x       Muscle Aches   x       Eye Irritation     x     Sneezing     x     Nasal Kemal/Drg   x       Sinus Pressure/Pain   x       Loss of smell     x     Dental pain     x     Sore Throat     x     Swollen Glands     x     Ear Pain/Fullness     x     Cough   x   Productive, yellow phlegm    Wheeze   x       Chest Pain    x      Shortness of breath   x       Rash     x     Other    x  Symptoms wake her up from sleep       Symptom duration:  a week and a half   Symptom severity:  moderate   Treatments tried:  neb machine -little to no relief, taking q 5 hours. Mucinex with no alleviation.    Contacts:  boyfriend with cough      -Up to date on vaccinations including flu shot fall 2017.   -Quit smoking 1 year ago but reports occasional single cigarette use  -Reports to not currently be taking Albuterol Inhaler, Beclomethasone or Flonase     Reviewed and updated as needed this visit by clinical staff  Tobacco  Allergies       Reviewed and updated as needed this visit by Provider         ROS:  Constitutional, HEENT, cardiovascular, pulmonary, gi and gu systems are negative, except as otherwise noted.      OBJECTIVE:   /75  Pulse 84  Temp 97.9  F (36.6  C) (Tympanic)  Wt 161 lb 14.4 oz (73.4 kg)  LMP 04/06/2016  SpO2 96%  BMI 30.1 kg/m2  Body mass index is 30.1 kg/(m^2).  GENERAL: healthy, alert and no acute distress  EYES: Eyes grossly normal to inspection and conjunctivae and sclerae normal  HENT: ear canals and TM's normal, nose and mouth without ulcers or lesions, maxillary and frontal sinuses non tender to palpation   NECK: no adenopathy, no asymmetry,  masses, or scars and thyroid normal to palpation  RESP: no rales , no rhonchi, expiratory wheezes throughout and decreased breath sounds throughout, no consolidation auscultated   CV: regular rate and rhythm, normal S1 S2, no S3 or S4, no murmur, click or rub, no peripheral edema  MS: no gross musculoskeletal defects noted, no edema  PSYCH: mentation appears normal, affect normal/bright    Diagnostic Test Results:  none     ASSESSMENT/PLAN:     Moderate persistent asthma with acute exacerbation  (primary encounter diagnosis)  Medical Decision Making: Differential included asthma exacerbation, sinusitis, pneumonia and bronchitis. Sinuses non tender to palpation with no otalgia or headaches reported making sinusitis less likely. Pneumonia unlikely as there is no consolidation auscultated on examination and no fevers reported. Bronchitis considered with coughing, chest tightness and occasional smoking reported by patient. Asthma exacerbation most likely given the patients history of asthma, wheezes auscultated on exam and reported non compliance of inhalers.     Plan: Pt educated on diagnoses due to inflammation and constriction of airways. Informed to take duo neb q 6 hours with use of Albuterol inhaler in between neb treatments. Also informed to start prednisone 20 mg  burst for asthma flare to reduce inflammation. Prescribed Azithromycin 250 mg due to asthmatic exacerbation and symptoms greater than 10 days.     Follow up: If symptoms persist despite 5 days of prednisone and antibiotics return for further evaluation. If there is increased shortness of breath, difficulty breathing or chest pain report to the ED.    Pt understands and is in agreement to plan.     SERVANDO Deshpande

## 2018-01-02 NOTE — MR AVS SNAPSHOT
After Visit Summary   1/2/2018    Rose Mary Rose    MRN: [id number]           Patient Information     Date Of Birth          1965        Visit Information        Provider Department      1/2/2018 8:20 AM Soledad Bauman APRN CNP [facility]        Today's Diagnoses     Moderate persistent asthma with acute exacerbation    -  1      Care Instructions    Duoneb every 6 hours.  May use albuterol in between.    Complete course of antibiotics.   Complete course of prednisone.    Restart the QVAR inhaler.    Call Friday if no improvement.          Thank you for choosing [facility].  You may be receiving a survey in the mail from [employer] regarding your visit today.  Please take a few minutes to complete and return the survey to let us know how we are doing.      If you have questions or concerns, please contact us via EMKinetics or you can contact your care team at [phone].    Our Clinic hours are:  Monday 6:40 am  to 7:00 pm  Tuesday -Friday 6:40 am to 5:00 pm    The [place] outpatient lab hours are:  Monday - Friday 6:10 am to 4:45 pm  Saturdays 7:00 am to 11:00 am  Appointments are required, call [phone]    If you have clinical questions after hours or would like to schedule an appointment,  call the clinic at [phone].            Follow-ups after your visit        Who to contact     If you have questions or need follow up information about today's clinic visit or your schedule please contact [facility] directly at [phone].  Normal or non-critical lab and imaging results will be communicated to you by [email or web address], letter or phone within 4 business days after the clinic has received the results. If you do not hear from us within 7 days, please contact the clinic through EMKinetics or phone. If you have a critical or abnormal lab result, we will notify you by phone as soon as possible.  Submit refill requests through [employer] or call your  pharmacy and they will forward the refill request to us. Please allow 3 business days for your refill to be completed.          Additional Information About Your Visit        SNAPCARDhart Information     [employer] gives you secure access to your electronic health record. If you see a primary care provider, you can also send messages to your care team and make appointments. If you have questions, please call your primary care clinic.  If you do not have a primary care provider, please call [phone] and they will assist you.        Care EveryWhere ID     This is your Care EveryWhere ID. This could be used by other organizations to access your [place] medical records  [id number]        Your Vitals Were     Pulse Temperature Last Period Pulse Oximetry BMI (Body Mass Index)       84 97.9  F (36.6  C) (Tympanic) 04/06/2016 96% 30.1 kg/m2        Blood Pressure from Last 3 Encounters:   01/02/18 150/75   10/24/17 120/71   09/18/17 131/64    Weight from Last 3 Encounters:   01/02/18 161 lb 14.4 oz (73.4 kg)   10/24/17 165 lb 12.8 oz (75.2 kg)   10/05/17 169 lb (76.7 kg)              Today, you had the following     No orders found for display         Today's Medication Changes          These changes are accurate as of: 1/2/18  8:54 AM.  If you have any questions, ask your nurse or doctor.               Start taking these medicines.        Dose/Directions    azithromycin 250 MG tablet   Commonly known as:  ZITHROMAX   Used for:  Moderate persistent asthma with acute exacerbation   Started by:  Soledad Bauman APRN CNP        Two tablets first day, then one tablet daily for four days.   Quantity:  6 tablet   Refills:  0       predniSONE 20 MG tablet   Commonly known as:  DELTASONE   Used for:  Moderate persistent asthma with acute exacerbation   Started by:  Soledad Bauman APRN CNP        Dose:  40 mg   Take 2 tablets (40 mg) by mouth daily for 5 days   Quantity:  10 tablet   Refills:  0             Where to get your medicines      These medications were [place] [facility] [place] - [address]  [address] [address]     Phone:  [phone]     azithromycin 250 MG tablet    predniSONE 20 MG tablet                Primary Care Provider Office Phone # Fax #    REDD Martinez [phone]       [address] [address]        Equal Access to Services     EDDA IQBAL : Hadii aad ku hadasho Soomaali, waaxda luqadaha, qaybta kaalmada adeegyada, waxay idiin hayaan adeeg kharash la'janis . So [facility] [place] [phone].    ATENCIÓN: Si habla español, tiene a jones disposición servicios gratuitos de asistencia lingüística. [facility] [phone].    We comply with applicable federal civil rights laws and [place] laws. We do not discriminate on the basis of race, color, national origin, age, disability, sex, sexual orientation, or gender identity.            Thank you!     Thank you for choosing [facility]  for your care. Our goal is always to provide you with excellent care. Hearing back from our patients is one way we can continue to improve our services. Please take a few minutes to complete the written survey that you may receive in the mail after your visit with us. Thank you!             Your Updated Medication List - Protect others around you: Learn how to safely use, store and throw away your medicines at www.disposemymeds.org.          This list is accurate as of: 1/2/18  8:54 AM.  Always use your most recent med list.                   Brand Name Dispense Instructions for use Diagnosis    * albuterol (2.5 MG/3ML) 0.083% neb solution     3 Box    Take 1 vial (2.5 mg) by nebulization every 4 hours as needed for shortness of breath / dyspnea    Moderate persistent asthma without complication       * albuterol 108 (90 BASE) MCG/ACT Inhaler    VENTOLIN HFA    54 g    SHAKE WELL, THEN INHALE 2 PUFFS INTO THE LUNGS EVERY 6  HOURS AS NEEDED FOR SHORTNESS OF BREATH/DYSPNEA    Moderate persistent asthma without complication       azithromycin 250 MG tablet    ZITHROMAX    6 tablet    Two tablets first day, then one tablet daily for four days.    Moderate persistent asthma with acute exacerbation       beclomethasone 80 MCG/ACT Inhaler    QVAR    3 Inhaler    Inhale 2 puffs into the lungs 2 times daily    Moderate persistent asthma without complication       clobetasol 0.05 % cream    TEMOVATE    60 g    Apply sparingly to affected area twice daily as needed.  Do not apply to face.    Dyshidrotic eczema       fluticasone 50 MCG/ACT spray    FLONASE    3 Bottle    Spray 2 sprays into both nostrils daily    Other acute sinusitis       ipratropium - albuterol 0.5 mg/2.5 mg/3 mL 0.5-2.5 (3) MG/3ML neb solution    DUONEB    1 Box    Take 1 vial (3 mLs) by nebulization every 6 hours as needed for shortness of breath / dyspnea or wheezing    Moderate persistent asthma without complication       levothyroxine 25 MCG tablet    SYNTHROID/LEVOTHROID    90 tablet    TAKE ONE TABLET BY MOUTH EVERY DAY    Acquired hypothyroidism       lisinopril 10 MG tablet    PRINIVIL/ZESTRIL    90 tablet    Take 1 tablet (10 mg) by mouth daily    Essential hypertension with goal blood pressure less than 140/90       loratadine 10 MG tablet    CLARITIN    90 tablet    TAKE ONE TABLET BY MOUTH EVERY DAY    Moderate persistent asthma without complication       omeprazole 40 MG capsule    priLOSEC    30 capsule    Take 1 capsule (40 mg) by mouth daily Take 30-60 minutes before a meal.    Gastroesophageal reflux disease without esophagitis       predniSONE 20 MG tablet    DELTASONE    10 tablet    Take 2 tablets (40 mg) by mouth daily for 5 days    Moderate persistent asthma with acute exacerbation       * Notice:  This list has 2 medication(s) that are the same as other medications prescribed for you. Read the directions carefully, and ask your doctor or other care  provider to review them with you.

## 2018-01-02 NOTE — PROGRESS NOTES
SUBJECTIVE:   Rose Mary Rose is a 52 year old female who presents to clinic today for the following health issues:    Chief Complaint   Patient presents with     Cough     productive cough and sinus congestion for about a week and a half.      ENT Symptoms           Symptoms: cc Present Absent Comment   Fever/Chills  x  sweats   Fatigue  x     Muscle Aches  x     Eye Irritation   x    Sneezing   x    Nasal Kemal/Drg  x     Sinus Pressure/Pain  x     Loss of smell   x    Dental pain   x    Sore Throat   x    Swollen Glands   x    Ear Pain/Fullness   x    Cough  x     Wheeze  x     Chest Pain  x     Shortness of breath  x     Rash   x    Other   x      Symptom duration:  a week and a half   Symptom severity:  moderate   Treatments tried:  neb machine -little to no relief   Contacts:  boyfriend with cough           Problem list and histories reviewed & adjusted, as indicated.  Additional history: as documented      Reviewed and updated as needed this visit by clinical staffAllergies       Reviewed and updated as needed this visit by Provider         ROS:  Constitutional, HEENT, cardiovascular, pulmonary, gi and gu systems are negative, except as otherwise noted.      OBJECTIVE:   /75  Pulse 84  Temp 97.9  F (36.6  C) (Tympanic)  Wt 161 lb 14.4 oz (73.4 kg)  LMP 04/06/2016  SpO2 96%  BMI 30.1 kg/m2  Body mass index is 30.1 kg/(m^2).  GENERAL: healthy, alert and no distress  HENT: ear canals and TM's normal, nose and mouth without ulcers or lesions  NECK: no adenopathy, no asymmetry, masses, or scars and thyroid normal to palpation  RESP: lungs clear to auscultation - no rales, rhonchi or wheezes  CV: regular rate and rhythm, normal S1 S2, no S3 or S4, no murmur, click or rub, no peripheral edema and peripheral pulses strong      ASSESSMENT/PLAN:       ICD-10-CM    1. Moderate persistent asthma with acute exacerbation J45.41 predniSONE (DELTASONE) 20 MG tablet     azithromycin (ZITHROMAX) 250 MG tablet        Patient Instructions   Duoneb every 6 hours.  May use albuterol in between.    Complete course of antibiotics.   Complete course of prednisone.    Restart the QVAR inhaler.    Call Friday if no improvement.        The risks, benefits and treatment options of prescribed medications or other treatments have been discussed with the patient. The patient verbalized their understanding and should call or follow up if no improvement or if they develop further problems.    REDD Tompkins [facility]

## 2018-01-09 ENCOUNTER — OFFICE VISIT (OUTPATIENT)
Dept: FAMILY MEDICINE | Facility: CLINIC | Age: 53
End: 2018-01-09
Payer: COMMERCIAL

## 2018-01-09 VITALS
HEART RATE: 81 BPM | DIASTOLIC BLOOD PRESSURE: 68 MMHG | OXYGEN SATURATION: 96 % | HEIGHT: 61 IN | BODY MASS INDEX: 30.85 KG/M2 | SYSTOLIC BLOOD PRESSURE: 116 MMHG | WEIGHT: 163.4 LBS | TEMPERATURE: 99.2 F

## 2018-01-09 DIAGNOSIS — Z82.49 FAMILY HISTORY OF ISCHEMIC HEART DISEASE: ICD-10-CM

## 2018-01-09 DIAGNOSIS — Z13.6 CARDIOVASCULAR SCREENING; LDL GOAL LESS THAN 130: ICD-10-CM

## 2018-01-09 DIAGNOSIS — J45.40 MODERATE PERSISTENT ASTHMA WITHOUT COMPLICATION: Primary | ICD-10-CM

## 2018-01-09 PROCEDURE — 99213 OFFICE O/P EST LOW 20 MIN: CPT | Performed by: NURSE PRACTITIONER

## 2018-01-09 NOTE — PATIENT INSTRUCTIONS
Make appointment with cardiology to discuss coronary calcium score testing.      Thank you for choosing [facility].  You may be receiving a survey in the mail from [employer] regarding your visit today.  Please take a few minutes to complete and return the survey to let us know how we are doing.      If you have questions or concerns, please contact us via 4Less or you can contact your care team at [phone].    Our Clinic hours are:  Monday 6:40 am  to 7:00 pm  Tuesday -Friday 6:40 am to 5:00 pm    The [place] outpatient lab hours are:  Monday - Friday 6:10 am to 4:45 pm  Saturdays 7:00 am to 11:00 am  Appointments are required, call [phone]    If you have clinical questions after hours or would like to schedule an appointment,  call the clinic at [phone].

## 2018-01-09 NOTE — NURSING NOTE
"Chief Complaint   Patient presents with     URI     follow up from 1/2/18 clinic visit      Health Maintenance     reminded due for colonoscopy - she is planning on doing this in March        Initial /68 (BP Location: Left arm, Patient Position: Chair, Cuff Size: Adult Large)  Pulse 81  Temp 99.2  F (37.3  C) (Tympanic)  Ht 5' 1.25\" (1.556 m)  Wt 163 lb 6.4 oz (74.1 kg)  LMP 04/06/2016  SpO2 96%  BMI 30.62 kg/m2 Estimated body mass index is 30.62 kg/(m^2) as calculated from the following:    Height as of this encounter: 5' 1.25\" (1.556 m).    Weight as of this encounter: 163 lb 6.4 oz (74.1 kg).  Medication Reconciliation: complete    "

## 2018-01-09 NOTE — PROGRESS NOTES
SUBJECTIVE:   Rose Mary Rose is a 52 year old female who presents to clinic today for the following health issues:  Chief Complaint   Patient presents with     URI     follow up from 1/2/18 clinic visit      Health Maintenance     reminded due for colonoscopy - she is planning on doing this in March          ENT Symptoms- Follow up from 1/2/2018 clinic visit              Symptoms: cc Present Absent Comment   Fever/Chills   X    Fatigue   X    Muscle Aches   X    Eye Irritation   X    Sneezing   X    Nasal Kemal/Drg  X     Sinus Pressure/Pain   X Sinus congestion/drainage- no pain    Loss of smell   X    Dental pain   X    Sore Throat   X    Swollen Glands   X    Ear Pain/Fullness   X    Cough  X  Productive cough with clear phlegm- still somewhat thick , but better than last visit    Wheeze   X    Chest Pain   X    Shortness of breath   X    Rash   X    Other         Symptom duration:  2 weeks  - feeling much better   Symptom severity:  mild    Treatments tried:  zithromax , inhalers, nebs, otc coug meds    Contacts:  no       Family h/o early heart disease:  Brother had first heart attack in his early 40s; just had second one in his early 50s.  Patient has well controlled HTN.  Prediabetes - last A1c 6 months ago was 6%.  She smokes occasionally - trying to quit.  She is worried about her own cardiovascular risk.    Recent Labs   Lab Test  06/10/17   0708  07/23/16   0753  07/24/15   0835  10/04/14   0700   CHOL  218*  184  201*  203*   HDL  56  69  66  67   LDL  124*  93  99  100   TRIG  188*  109  180*  181*   CHOLHDLRATIO   --    --   3.0  3.0       The 10-year ASCVD risk score (Mirna ANDREA Jr, et al., 2013) is: 5.4%    Values used to calculate the score:      Age: 52 years      Sex: Female      Is Non- : No      Diabetic: No      Tobacco smoker: Yes      Systolic Blood Pressure: 116 mmHg      Is BP treated: Yes      HDL Cholesterol: 56 mg/dL      Total Cholesterol: 218 mg/dL      Lab  "Results   Component Value Date    A1C 6.0 06/10/2017    A1C 6.1 07/23/2016    A1C 5.9 07/24/2015    A1C 5.2 04/20/2013    A1C 5.7 09/08/2012         Problem list and histories reviewed & adjusted, as indicated.  Additional history: as documented    Reviewed and updated as needed this visit by clinical staffTobacco  Allergies  Meds  Med Hx  Surg Hx  Fam Hx  Soc Hx      Reviewed and updated as needed this visit by Provider         ROS:  Constitutional, HEENT, cardiovascular, pulmonary, gi and gu systems are negative, except as otherwise noted.      OBJECTIVE:   /68 (BP Location: Left arm, Patient Position: Chair, Cuff Size: Adult Large)  Pulse 81  Temp 99.2  F (37.3  C) (Tympanic)  Ht 5' 1.25\" (1.556 m)  Wt 163 lb 6.4 oz (74.1 kg)  LMP 04/06/2016  SpO2 96%  BMI 30.62 kg/m2  Body mass index is 30.62 kg/(m^2).  GENERAL: healthy, alert and no distress  HENT: ear canals and TM's normal, nose and mouth without ulcers or lesions  NECK: no adenopathy, no asymmetry, masses, or scars and thyroid normal to palpation  RESP: lungs clear to auscultation - no rales, rhonchi or wheezes  CV: regular rate and rhythm, normal S1 S2, no S3 or S4, no murmur, click or rub, no peripheral edema and peripheral pulses strong      ASSESSMENT/PLAN:       ICD-10-CM    1. Moderate persistent asthma without complication J45.40 Improved.  Continue Neti pot twice daily  Continue Mucinex if needed.  Follow up if symptoms worsen again.     2. CARDIOVASCULAR SCREENING; LDL GOAL LESS THAN 130   Z13.6 Lipid panel reflex to direct LDL Fasting   3. Family history of ischemic heart disease Z82.49 Patient is interested in getting a coronary calcium score test to further risk stratify given her family history.  In the meantime, encouraged complete cessation of tobacco.  Will recheck lipids.  Encouraged healthy diet, regular exercise and weight loss.  CARDIOLOGY EVAL ADULT REFERRAL       Patient Instructions   Make appointment with cardiology " to discuss coronary calcium score testing.      The risks, benefits and treatment options of prescribed medications or other treatments have been discussed with the patient. The patient verbalized their understanding and should call or follow up if no improvement or if they develop further problems.    REDD Tompkins [facility]

## 2018-01-09 NOTE — MR AVS SNAPSHOT
After Visit Summary   1/9/2018    Rose Mary Rose    MRN: [id number]           Patient Information     Date Of Birth          1965        Visit Information        Provider Department      1/9/2018 7:40 AM Soledad Bauman APRN CNP [facility]        Today's Diagnoses     Moderate persistent asthma without complication    -  1    CARDIOVASCULAR SCREENING; LDL GOAL LESS THAN 130        Family history of ischemic heart disease          Care Instructions    Make appointment with cardiology to discuss coronary calcium score testing.      Thank you for choosing [facility].  You may be receiving a survey in the mail from [employer] regarding your visit today.  Please take a few minutes to complete and return the survey to let us know how we are doing.      If you have questions or concerns, please contact us via Bill Me Later or you can contact your care team at [phone].    Our Clinic hours are:  Monday 6:40 am  to 7:00 pm  Tuesday -Friday 6:40 am to 5:00 pm    The [place] outpatient lab hours are:  Monday - Friday 6:10 am to 4:45 pm  Saturdays 7:00 am to 11:00 am  Appointments are required, call [phone]    If you have clinical questions after hours or would like to schedule an appointment,  call the clinic at [phone].            Follow-ups after your visit        Additional Services     CARDIOLOGY EVAL ADULT REFERRAL       Your provider has referred you to:  [facility]: [facility] [phone]   [email or web address]    Please be aware that coverage of these services is subject to the terms and limitations of your health insurance plan.  Call member services at your health plan with any benefit or coverage questions.      Type of Referral:  New Cardiology Consult    Timeframe requested:  Within 1 month    Please bring the following to your appointment:  >>   Any x-rays, CTs or MRIs  "which have been performed.  Contact the facility where they were done to arrange for  prior to your scheduled appointment.    >>   List of current medications  >>   This referral request   >>   Any documents/labs given to you for this referral                  Future tests that were ordered for you today     Open Future Orders        Priority Expected Expires Ordered    Lipid panel reflex to direct LDL Fasting Routine 1/9/2018 1/9/2019 1/9/2018            Who to contact     If you have questions or need follow up information about today's clinic visit or your schedule please contact [facility] directly at [phone].  Normal or non-critical lab and imaging results will be communicated to you by Plan Me Uphart, letter or phone within 4 business days after the clinic has received the results. If you do not hear from us within 7 days, please contact the clinic through Yesmailt or phone. If you have a critical or abnormal lab result, we will notify you by phone as soon as possible.  Submit refill requests through [employer] or call your pharmacy and they will forward the refill request to us. Please allow 3 business days for your refill to be completed.          Additional Information About Your Visit        MyChart Information     [employer] gives you secure access to your electronic health record. If you see a primary care provider, you can also send messages to your care team and make appointments. If you have questions, please call your primary care clinic.  If you do not have a primary care provider, please call [phone] and they will assist you.        Care EveryWhere ID     This is your Care EveryWhere ID. This could be used by other organizations to access your [place] medical records  [id number]        Your Vitals Were     Pulse Temperature Height Last Period Pulse Oximetry BMI (Body Mass Index)    81 99.2  F (37.3  C) (Tympanic) 5' 1.25\" (1.556 m) 04/06/2016 96% 30.62 kg/m2       Blood Pressure " from Last 3 Encounters:   01/09/18 116/68   01/02/18 150/75   10/24/17 120/71    Weight from Last 3 Encounters:   01/09/18 163 lb 6.4 oz (74.1 kg)   01/02/18 161 lb 14.4 oz (73.4 kg)   10/24/17 165 lb 12.8 oz (75.2 kg)              We Performed the Following     CARDIOLOGY EVAL ADULT REFERRAL        Primary Care Provider Office Phone # Fax #    Soledad Gonzalez Dawson Bauman, REDD [phone] [address] [address]        Equal Access to Services     [facility]SHA[facility] : Hadii aad ku hadasho Soomaali, waaxda luqadaha, qaybta kaalmada adeegyada, waxdarron idiin hayaan dionne allen . So [facility] [phone].    ATENCIÓN: Si habla español, tiene a jones disposición servicios gratuitos de asistencia lingüística. Llame al [phone].    We comply with applicable federal civil rights laws and [place] laws. We do not discriminate on the basis of race, color, national origin, age, disability, sex, sexual orientation, or gender identity.            Thank you!     Thank you for choosing [facility]  for your care. Our goal is always to provide you with excellent care. Hearing back from our patients is one way we can continue to improve our services. Please take a few minutes to complete the written survey that you may receive in the mail after your visit with us. Thank you!             Your Updated Medication List - Protect others around you: Learn how to safely use, store and throw away your medicines at www.disposemymeds.org.          This list is accurate as of: 1/9/18  8:05 AM.  Always use your most recent med list.                   Brand Name Dispense Instructions for use Diagnosis    * albuterol (2.5 MG/3ML) 0.083% neb solution     3 Box    Take 1 vial (2.5 mg) by nebulization every 4 hours as needed for shortness of breath / dyspnea    Moderate persistent asthma without complication       * albuterol 108 (90 BASE) MCG/ACT Inhaler    VENTOLIN HFA    54 g    SHAKE WELL, THEN  INHALE 2 PUFFS INTO THE LUNGS EVERY 6 HOURS AS NEEDED FOR SHORTNESS OF BREATH/DYSPNEA    Moderate persistent asthma without complication       beclomethasone 80 MCG/ACT Inhaler    QVAR    3 Inhaler    Inhale 2 puffs into the lungs 2 times daily    Moderate persistent asthma without complication       clobetasol 0.05 % cream    TEMOVATE    60 g    Apply sparingly to affected area twice daily as needed.  Do not apply to face.    Dyshidrotic eczema       fluticasone 50 MCG/ACT spray    FLONASE    3 Bottle    Spray 2 sprays into both nostrils daily    Other acute sinusitis       ipratropium - albuterol 0.5 mg/2.5 mg/3 mL 0.5-2.5 (3) MG/3ML neb solution    DUONEB    1 Box    Take 1 vial (3 mLs) by nebulization every 6 hours as needed for shortness of breath / dyspnea or wheezing    Moderate persistent asthma without complication       levothyroxine 25 MCG tablet    SYNTHROID/LEVOTHROID    90 tablet    Take 1 tablet (25 mcg) by mouth daily    Acquired hypothyroidism       lisinopril 10 MG tablet    PRINIVIL/ZESTRIL    90 tablet    Take 1 tablet (10 mg) by mouth daily    Essential hypertension with goal blood pressure less than 140/90       loratadine 10 MG tablet    CLARITIN    90 tablet    TAKE ONE TABLET BY MOUTH EVERY DAY    Moderate persistent asthma without complication       omeprazole 40 MG capsule    priLOSEC    30 capsule    Take 1 capsule (40 mg) by mouth daily Take 30-60 minutes before a meal.    Gastroesophageal reflux disease without esophagitis       * Notice:  This list has 2 medication(s) that are the same as other medications prescribed for you. Read the directions carefully, and ask your doctor or other care provider to review them with you.

## 2018-01-13 DIAGNOSIS — Z13.6 CARDIOVASCULAR SCREENING; LDL GOAL LESS THAN 130: ICD-10-CM

## 2018-01-13 LAB
CHOLEST SERPL-MCNC: 227 MG/DL
HDLC SERPL-MCNC: 72 MG/DL
LDLC SERPL CALC-MCNC: 125 MG/DL
NONHDLC SERPL-MCNC: 155 MG/DL
TRIGL SERPL-MCNC: 149 MG/DL

## 2018-01-13 PROCEDURE — 80061 LIPID PANEL: CPT | Performed by: NURSE PRACTITIONER

## 2018-01-13 PROCEDURE — 36415 COLL VENOUS BLD VENIPUNCTURE: CPT | Performed by: NURSE PRACTITIONER

## 2018-01-18 ENCOUNTER — MYC MEDICAL ADVICE (OUTPATIENT)
Dept: FAMILY MEDICINE | Facility: CLINIC | Age: 53
End: 2018-01-18

## 2018-01-26 ENCOUNTER — OFFICE VISIT (OUTPATIENT)
Dept: FAMILY MEDICINE | Facility: CLINIC | Age: 53
End: 2018-01-26
Payer: COMMERCIAL

## 2018-01-26 VITALS
SYSTOLIC BLOOD PRESSURE: 135 MMHG | WEIGHT: 164 LBS | DIASTOLIC BLOOD PRESSURE: 74 MMHG | TEMPERATURE: 99.1 F | HEART RATE: 69 BPM | HEIGHT: 61 IN | BODY MASS INDEX: 30.96 KG/M2 | OXYGEN SATURATION: 95 %

## 2018-01-26 DIAGNOSIS — J45.41 MODERATE PERSISTENT ASTHMA WITH EXACERBATION: Primary | ICD-10-CM

## 2018-01-26 PROCEDURE — 99214 OFFICE O/P EST MOD 30 MIN: CPT | Performed by: NURSE PRACTITIONER

## 2018-01-26 RX ORDER — PREDNISONE 20 MG/1
TABLET ORAL
Qty: 20 TABLET | Refills: 0 | Status: SHIPPED | OUTPATIENT
Start: 2018-01-26 | End: 2018-02-13

## 2018-01-26 RX ORDER — DOXYCYCLINE 100 MG/1
100 CAPSULE ORAL 2 TIMES DAILY
Qty: 20 CAPSULE | Refills: 0 | Status: SHIPPED | OUTPATIENT
Start: 2018-01-26 | End: 2018-02-13

## 2018-01-26 NOTE — PROGRESS NOTES
"  SUBJECTIVE:   Rose Mary Rose is a 52 year old female who presents to clinic today for the following health issues:      ENT Symptoms             Symptoms: cc Present Absent Comment   Fever/Chills   x    Fatigue   x    Muscle Aches   x    Eye Irritation  x  drainage   Sneezing       Nasal Kemal/Drg  x  draining   Sinus Pressure/Pain   x    Loss of smell       Dental pain       Sore Throat   x    Swollen Glands       Ear Pain/Fullness   x    Cough x x     Wheeze  x     Chest Pain   x    Shortness of breath x x     Rash       Other  x  Slight headache     Symptom duration:  was seen 1/9- was better  Now started with same symptoms 5 days ago   Symptom severity:  mild;  Shortness of breath is moderate to severe   Treatments tried:  cough medicine, inhaler and nebulizer machine, sinus rinse   Contacts:  coworkers         Problem list and histories reviewed & adjusted, as indicated.  Additional history: as documented      Reviewed and updated as needed this visit by clinical staff  Tobacco  Allergies  Meds       Reviewed and updated as needed this visit by Provider         ROS:  Constitutional, HEENT, cardiovascular, pulmonary, gi and gu systems are negative, except as otherwise noted.    OBJECTIVE:     /74 (BP Location: Right arm)  Pulse 69  Temp 99.1  F (37.3  C) (Tympanic)  Ht 5' 1.25\" (1.556 m)  Wt 164 lb (74.4 kg)  LMP 04/06/2016  SpO2 95%  BMI 30.74 kg/m2  Body mass index is 30.74 kg/(m^2).  GENERAL: healthy, alert and no distress  HENT: ear canals and TM's normal, nose and mouth without ulcers or lesions  NECK: no adenopathy, no asymmetry, masses, or scars and thyroid normal to palpation  RESP: lungs clear to auscultation - no rales, rhonchi or wheezes  CV: regular rate and rhythm, normal S1 S2, no S3 or S4, no murmur, click or rub, no peripheral edema and peripheral pulses strong      ASSESSMENT/PLAN:       ICD-10-CM    1. Moderate persistent asthma with exacerbation J45.41 predniSONE " (DELTASONE) 20 MG tablet     doxycycline (VIBRAMYCIN) 100 MG capsule     Second episode in one month.  New script for antibiotics given.  Longer course of prednisone.  Continue to neb every 6 hours.  Follow up next week if no improvement - go to ER over the weekend if worsens.      The risks, benefits and treatment options of prescribed medications or other treatments have been discussed with the patient. The patient verbalized their understanding and should call or follow up if no improvement or if they develop further problems.    REDD Tompikns [facility]

## 2018-01-26 NOTE — PATIENT INSTRUCTIONS
Thank you for choosing [facility].  You may be receiving a survey in the mail from Naveed Clark regarding your visit today.  Please take a few minutes to complete and return the survey to let us know how we are doing.      If you have questions or concerns, please contact us via Primekss or you can contact your care team at [phone].    Our Clinic hours are:  Monday 6:40 am  to 7:00 pm  Tuesday -Friday 6:40 am to 5:00 pm    The [place] outpatient lab hours are:  Monday - Friday 6:10 am to 4:45 pm  Saturdays 7:00 am to 11:00 am  Appointments are required, call [phone]    If you have clinical questions after hours or would like to schedule an appointment,  call the clinic at [phone].

## 2018-01-26 NOTE — MR AVS SNAPSHOT
After Visit Summary   1/26/2018    Rose Mary Rose    MRN: [id number]           Patient Information     Date Of Birth          1965        Visit Information        Provider Department      1/26/2018 2:00 PM Soledad Bauman APRN CNP [facility]        Today's Diagnoses     Moderate persistent asthma with exacerbation    -  1      Care Instructions          Thank you for choosing [facility].  You may be receiving a survey in the mail from [facility] regarding your visit today.  Please take a few minutes to complete and return the survey to let us know how we are doing.      If you have questions or concerns, please contact us via Craft Dragon or you can contact your care team at [phone].    Our Clinic hours are:  Monday 6:40 am  to 7:00 pm  Tuesday -Friday 6:40 am to 5:00 pm    The [place] outpatient lab hours are:  Monday - Friday 6:10 am to 4:45 pm  Saturdays 7:00 am to 11:00 am  Appointments are required, call [phone]    If you have clinical questions after hours or would like to schedule an appointment,  call the clinic at [phone].            Follow-ups after your visit        Who to contact     If you have questions or need follow up information about today's clinic visit or your schedule please contact [facility] directly at [phone].  Normal or non-critical lab and imaging results will be communicated to you by ExoYouhart, letter or phone within 4 business days after the clinic has received the results. If you do not hear from us within 7 days, please contact the clinic through LogicSourcet or phone. If you have a critical or abnormal lab result, we will notify you by phone as soon as possible.  Submit refill requests through Craft Dragon or call your pharmacy and they will forward the refill request to us. Please allow 3 business days for your refill to be completed.          Additional Information About Your Visit        [facility]  "Information     Ayana gives you secure access to your electronic health record. If you see a primary care provider, you can also send messages to your care team and make appointments. If you have questions, please call your primary care clinic.  If you do not have a primary care provider, please call [phone] and they will assist you.        Care EveryWhere ID     This is your Care EveryWhere ID. This could be used by other organizations to access your [facility] medical records  [id number]        Your Vitals Were     Pulse Temperature Height Last Period Pulse Oximetry BMI (Body Mass Index)    69 99.1  F (37.3  C) (Tympanic) 5' 1.25\" (1.556 m) 04/06/2016 95% 30.74 kg/m2       Blood Pressure from Last 3 Encounters:   01/26/18 135/74   01/09/18 116/68   01/02/18 150/75    Weight from Last 3 Encounters:   01/26/18 164 lb (74.4 kg)   01/09/18 163 lb 6.4 oz (74.1 kg)   01/02/18 161 lb 14.4 oz (73.4 kg)              Today, you had the following     No orders found for display         Today's Medication Changes          These changes are accurate as of 1/26/18  2:48 PM.  If you have any questions, ask your nurse or doctor.               Start taking these medicines.        Dose/Directions    doxycycline 100 MG capsule   Commonly known as:  VIBRAMYCIN   Used for:  Moderate persistent asthma with exacerbation   Started by:  Soledad Bauman APRN CNP        Dose:  100 mg   Take 1 capsule (100 mg) by mouth 2 times daily   Quantity:  20 capsule   Refills:  0       predniSONE 20 MG tablet   Commonly known as:  DELTASONE   Used for:  Moderate persistent asthma with exacerbation   Started by:  Soledad Bauman APRN CNP        Take 3 tabs (60 mg) by mouth daily x 3 days, 2 tabs (40 mg) daily x 3 days, 1 tab (20 mg) daily x 3 days, then 1/2 tab (10 mg) x 3 days.   Quantity:  20 tablet   Refills:  0            Where to get your medicines      These medications were sent to [facility] [place] - " [place], [place] - [address]  [address], [place] [address]     Phone:  [phone]     doxycycline 100 MG capsule    predniSONE 20 MG tablet                Primary Care Provider Office Phone # Fax #    REDD Martinez [facility] [phone]       [address] [address]        Equal Access to Services     EDDA IQBAL : Hadii aad ku hadasho Soomaali, waaxda luqadaha, qaybta kaalmada adeegyada, waxay idiin hayaan adeeg kharash la'aan . So [facility] [phone].    ATENCIÓN: Si habla español, tiene a jones disposición servicios gratuitos de asistencia lingüística. Gisselle al [phone].    We comply with applicable federal civil rights laws and [place] laws. We do not discriminate on the basis of race, color, national origin, age, disability, sex, sexual orientation, or gender identity.            Thank you!     Thank you for choosing [facility]  for your care. Our goal is always to provide you with excellent care. Hearing back from our patients is one way we can continue to improve our services. Please take a few minutes to complete the written survey that you may receive in the mail after your visit with us. Thank you!             Your Updated Medication List - Protect others around you: Learn how to safely use, store and throw away your medicines at www.disposemymeds.org.          This list is accurate as of 1/26/18  2:48 PM.  Always use your most recent med list.                   Brand Name Dispense Instructions for use Diagnosis    * albuterol (2.5 MG/3ML) 0.083% neb solution     3 Box    Take 1 vial (2.5 mg) by nebulization every 4 hours as needed for shortness of breath / dyspnea    Moderate persistent asthma without complication       * albuterol 108 (90 BASE) MCG/ACT Inhaler    VENTOLIN HFA    54 g    SHAKE WELL, THEN INHALE 2 PUFFS INTO THE LUNGS EVERY 6 HOURS AS NEEDED FOR SHORTNESS OF BREATH/DYSPNEA    Moderate persistent asthma without  complication       beclomethasone 80 MCG/ACT Inhaler    QVAR    3 Inhaler    Inhale 2 puffs into the lungs 2 times daily    Moderate persistent asthma without complication       clobetasol 0.05 % cream    TEMOVATE    60 g    Apply sparingly to affected area twice daily as needed.  Do not apply to face.    Dyshidrotic eczema       doxycycline 100 MG capsule    VIBRAMYCIN    20 capsule    Take 1 capsule (100 mg) by mouth 2 times daily    Moderate persistent asthma with exacerbation       fluticasone 50 MCG/ACT spray    FLONASE    3 Bottle    Spray 2 sprays into both nostrils daily    Other acute sinusitis       ipratropium - albuterol 0.5 mg/2.5 mg/3 mL 0.5-2.5 (3) MG/3ML neb solution    DUONEB    1 Box    Take 1 vial (3 mLs) by nebulization every 6 hours as needed for shortness of breath / dyspnea or wheezing    Moderate persistent asthma without complication       levothyroxine 25 MCG tablet    SYNTHROID/LEVOTHROID    90 tablet    Take 1 tablet (25 mcg) by mouth daily    Acquired hypothyroidism       lisinopril 10 MG tablet    PRINIVIL/ZESTRIL    90 tablet    Take 1 tablet (10 mg) by mouth daily    Essential hypertension with goal blood pressure less than 140/90       loratadine 10 MG tablet    CLARITIN    90 tablet    TAKE ONE TABLET BY MOUTH EVERY DAY    Moderate persistent asthma without complication       omeprazole 40 MG capsule    priLOSEC    30 capsule    Take 1 capsule (40 mg) by mouth daily Take 30-60 minutes before a meal.    Gastroesophageal reflux disease without esophagitis       predniSONE 20 MG tablet    DELTASONE    20 tablet    Take 3 tabs (60 mg) by mouth daily x 3 days, 2 tabs (40 mg) daily x 3 days, 1 tab (20 mg) daily x 3 days, then 1/2 tab (10 mg) x 3 days.    Moderate persistent asthma with exacerbation       * Notice:  This list has 2 medication(s) that are the same as other medications prescribed for you. Read the directions carefully, and ask your doctor or other care provider to review  them with you.

## 2018-01-26 NOTE — LETTER
My Asthma Action Plan  Name: Rose Mary Rose   YOB: 1965  Date: 1/26/2018   My doctor: REDD Tompkins CNP   My clinic: [facility]        My Control Medicine: { :347905}  My Rescue Medicine: { :764742}  {AAP include Oral Steroid:875762} My Asthma Severity: { :003847}  Avoid your asthma triggers: { :854192}        {Is patient a child or adult?:010523}       GREEN ZONE   Good Control    I feel good    No cough or wheeze    Can work, sleep and play without asthma symptoms       Take your asthma control medicine every day.     1. If exercise triggers your asthma, take your rescue medication    15 minutes before exercise or sports, and    During exercise if you have asthma symptoms  2. Spacer to use with inhaler: If you have a spacer, make sure to use it with your inhaler             YELLOW ZONE Getting Worse  I have ANY of these:    I do not feel good    Cough or wheeze    Chest feels tight    Wake up at night   1. Keep taking your Green Zone medications  2. Start taking your rescue medicine:    every 20 minutes for up to 1 hour. Then every 4 hours for 24-48 hours.  3. If you stay in the Yellow Zone for more than 12-24 hours, contact your doctor.  4. If you do not return to the Green Zone in 12-24 hours or you get worse, start taking your oral steroid medicine if prescribed by your provider.           RED ZONE Medical Alert - Get Help  I have ANY of these:    I feel awful    Medicine is not helping    Breathing getting harder    Trouble walking or talking    Nose opens wide to breathe       1. Take your rescue medicine NOW  2. If your provider has prescribed an oral steroid medicine, start taking it NOW  3. Call your doctor NOW  4. If you are still in the Red Zone after 20 minutes and you have not reached your doctor:    Take your rescue medicine again and    Call 911 or go to the emergency room right away    See your regular doctor within 2 weeks of an Emergency Room or  Urgent Care visit for follow-up treatment.        Electronically signed by: Tran Ambrose, January 26, 2018    Annual Reminders:  Meet with Asthma Educator,  Flu Shot in the Fall, consider Pneumonia Vaccination for patients with asthma (aged 19 and older).    Pharmacy:    [facility] - [place], [place] - [address] [facility] [place] [facility] [address] [facility] [place] [place] [facility] [address] [facility] [address] - [place] - [address]                    Asthma Triggers  How To Control Things That Make Your Asthma Worse    Triggers are things that make your asthma worse.  Look at the list below to help you find your triggers and what you can do about them.  You can help prevent asthma flare-ups by staying away from your triggers.      Trigger                                                          What you can do   Cigarette Smoke  Tobacco smoke can make asthma worse. Do not allow smoking in your home, car or around you.  Be sure no one smokes at a child s day care or school.  If you smoke, ask your health care provider for ways to help you quit.  Ask family members to quit too.  Ask your health care provider for a referral to Quit Plan to help you quit smoking, or call 9-343-998-PLAN.     Colds, Flu, Bronchitis  These are common triggers of asthma. Wash your hands often.  Don t touch your eyes, nose or mouth.  Get a flu shot every year.     Dust Mites  These are tiny bugs that live in cloth or carpet. They are too small to see. Wash sheets and blankets in hot water every week.   Encase pillows and mattress in dust mite proof covers.  Avoid having carpet if you can. If you have carpet, vacuum weekly.   Use a dust mask and HEPA vacuum.   Pollen and Outdoor Mold  Some people are allergic to trees, grass, or weed pollen, or molds. Try to keep your windows closed.  Limit time out doors when pollen count is high.   Ask you health care  provider about taking medicine during allergy season.     Animal Dander  Some people are allergic to skin flakes, urine or saliva from pets with fur or feathers. Keep pets with fur or feathers out of your home.    If you can t keep the pet outdoors, then keep the pet out of your bedroom.  Keep the bedroom door closed.  Keep pets off cloth furniture and away from stuffed toys.     Mice, Rats, and Cockroaches  Some people are allergic to the waste from these pests.   Cover food and garbage.  Clean up spills and food crumbs.  Store grease in the refrigerator.   Keep food out of the bedroom.   Indoor Mold  This can be a trigger if your home has high moisture. Fix leaking faucets, pipes, or other sources of water.   Clean moldy surfaces.  Dehumidify basement if it is damp and smelly.   Smoke, Strong Odors, and Sprays  These can reduce air quality. Stay away from strong odors and sprays, such as perfume, powder, hair spray, paints, smoke incense, paint, cleaning products, candles and new carpet.   Exercise or Sports  Some people with asthma have this trigger. Be active!  Ask your doctor about taking medicine before sports or exercise to prevent symptoms.    Warm up for 5-10 minutes before and after sports or exercise.     Other Triggers of Asthma  Cold air:  Cover your nose and mouth with a scarf.  Sometimes laughing or crying can be a trigger.  Some medicines and food can trigger asthma.

## 2018-02-03 ENCOUNTER — HEALTH MAINTENANCE LETTER (OUTPATIENT)
Age: 53
End: 2018-02-03

## 2018-02-11 DIAGNOSIS — J45.40 MODERATE PERSISTENT ASTHMA WITHOUT COMPLICATION: ICD-10-CM

## 2018-02-11 RX ORDER — ALBUTEROL SULFATE 0.83 MG/ML
SOLUTION RESPIRATORY (INHALATION)
Qty: 270 ML | Refills: 3 | Status: CANCELLED | OUTPATIENT
Start: 2018-02-11

## 2018-02-12 NOTE — TELEPHONE ENCOUNTER
"Requested Prescriptions   Pending Prescriptions Disp Refills     albuterol (2.5 MG/3ML) 0.083% neb solution [Pharmacy Med Name: ALBUTEROL SULFATE 2.5 MG/3ML NEBU] 270 mL 3     Sig: NEBULIZE CONTENTS OF ONE VIAL EVERY 4 HOURS AS NEEDED FOR SHORTNESS OF BREATH    Asthma Maintenance Inhalers - Anticholinergics Passed    2/11/2018 12:39 PM       Passed - Patient is age 12 years or older       Passed - Asthma control test score is 20 or greater in last 6 months    Please review ACT score.          Passed - Recent (6 mo) or future visit with authorizing provider's specialty    Patient had office visit in the last 6 months or has a visit in the next 30 days with authorizing provider.  See \"Patient Info\" tab in inbasket, or \"Choose Columns\" in Meds & Orders section of the refill encounter.            Last Written Prescription Date:  6/9/17  Last Fill Quantity: 3,  # refills: 3   Last office visit: 1/26/2018 with prescribing provider:  1/26/18   Future Office Visit:   Next 5 appointments (look out 90 days)     Feb 13, 2018  8:00 AM CST   MyChart Long with REDD Martinez CNP   [facility]    [address] [address] [phone]                   "

## 2018-02-13 ENCOUNTER — OFFICE VISIT (OUTPATIENT)
Dept: FAMILY MEDICINE | Facility: CLINIC | Age: 53
End: 2018-02-13
Payer: COMMERCIAL

## 2018-02-13 VITALS
TEMPERATURE: 97.9 F | WEIGHT: 166.2 LBS | HEART RATE: 67 BPM | OXYGEN SATURATION: 96 % | DIASTOLIC BLOOD PRESSURE: 72 MMHG | BODY MASS INDEX: 31.38 KG/M2 | HEIGHT: 61 IN | SYSTOLIC BLOOD PRESSURE: 127 MMHG

## 2018-02-13 DIAGNOSIS — J45.40 MODERATE PERSISTENT ASTHMA WITHOUT COMPLICATION: Primary | ICD-10-CM

## 2018-02-13 PROCEDURE — 99213 OFFICE O/P EST LOW 20 MIN: CPT | Performed by: NURSE PRACTITIONER

## 2018-02-13 RX ORDER — PREDNISONE 20 MG/1
40 TABLET ORAL DAILY
Qty: 10 TABLET | Refills: 0 | Status: SHIPPED | OUTPATIENT
Start: 2018-02-13 | End: 2018-02-18

## 2018-02-13 RX ORDER — ALBUTEROL SULFATE 0.83 MG/ML
1 SOLUTION RESPIRATORY (INHALATION) EVERY 4 HOURS PRN
Qty: 3 BOX | Refills: 3 | Status: SHIPPED | OUTPATIENT
Start: 2018-02-13 | End: 2018-08-17

## 2018-02-13 NOTE — MR AVS SNAPSHOT
After Visit Summary   2/13/2018    Rose Mary Rose    MRN: [id number]           Patient Information     Date Of Birth          1965        Visit Information        Provider Department      2/13/2018 8:00 AM Soledad Bauman APRN CNP [facility]        Today's Diagnoses     Moderate persistent asthma without complication    -  1      Care Instructions          Thank you for choosing [facility].  You may be receiving a survey in the mail from [facility] regarding your visit today.  Please take a few minutes to complete and return the survey to let us know how we are doing.      If you have questions or concerns, please contact us via CoinPass or you can contact your care team at [phone].    Our Clinic hours are:  Monday 6:40 am  to 7:00 pm  Tuesday -Friday 6:40 am to 5:00 pm    The [place] outpatient lab hours are:  Monday - Friday 6:10 am to 4:45 pm  Saturdays 7:00 am to 11:00 am  Appointments are required, call [phone]    If you have clinical questions after hours or would like to schedule an appointment,  call the clinic at [phone].            Follow-ups after your visit        Who to contact     If you have questions or need follow up information about today's clinic visit or your schedule please contact [facility] directly at [phone].  Normal or non-critical lab and imaging results will be communicated to you by [employer], letter or phone within 4 business days after the clinic has received the results. If you do not hear from us within 7 days, please contact the clinic through [employer] or phone. If you have a critical or abnormal lab result, we will notify you by phone as soon as possible.  Submit refill requests through [employer] or call your pharmacy and they will forward the refill request to us. Please allow 3 business days for your refill to be completed.          Additional Information About Your Visit        [facility]  "Information     [employer] gives you secure access to your electronic health record. If you see a primary care provider, you can also send messages to your care team and make appointments. If you have questions, please call your primary care clinic.  If you do not have a primary care provider, please call [phone] and they will assist you.        Care EveryWhere ID     This is your Care EveryWhere ID. This could be used by other organizations to access your [facility] medical records  [id number]        Your Vitals Were     Pulse Temperature Height Last Period Pulse Oximetry BMI (Body Mass Index)    67 97.9  F (36.6  C) (Tympanic) 5' 1.25\" (1.556 m) 04/06/2016 96% 31.15 kg/m2       Blood Pressure from Last 3 Encounters:   02/13/18 127/72   01/26/18 135/74   01/09/18 116/68    Weight from Last 3 Encounters:   02/13/18 166 lb 3.2 oz (75.4 kg)   01/26/18 164 lb (74.4 kg)   01/09/18 163 lb 6.4 oz (74.1 kg)              Today, you had the following     No orders found for display         Today's Medication Changes          These changes are accurate as of 2/13/18  8:20 AM.  If you have any questions, ask your nurse or doctor.               Start taking these medicines.        Dose/Directions    predniSONE 20 MG tablet   Commonly known as:  DELTASONE   Used for:  Moderate persistent asthma without complication   Started by:  Soledad Bauman APRN CNP        Dose:  40 mg   Take 2 tablets (40 mg) by mouth daily for 5 days If in the red zone   Quantity:  10 tablet   Refills:  0            Where to get your medicines      These medications were sent to [facility] [address]  [address] [facility] [address] [facility] [place] [address]     Phone:  [phone]     albuterol (2.5 MG/3ML) 0.083% neb solution    predniSONE 20 MG tablet                Primary Care Provider Office Phone # Fax #    REDD Martinez [phone] [address] [facility] [address]   "      Equal Access to Services     [facility] : Hadii aad ku hadrogermarilynn Xuanali, waaxda luqadaha, qaybta kashaqaquilino malcolm, patrick la. So [facility] [phone].    ATENCIÓN: Si habla español, tiene a jones disposición servicios gratuitos de asistencia lingüística. Codieame al [phone].    We comply with applicable federal civil rights laws and [place] laws. We do not discriminate on the basis of race, color, national origin, age, disability, sex, sexual orientation, or gender identity.            Thank you!     Thank you for choosing [facility]  for your care. Our goal is always to provide you with excellent care. Hearing back from our patients is one way we can continue to improve our services. Please take a few minutes to complete the written survey that you may receive in the mail after your visit with us. Thank you!             Your Updated Medication List - Protect others around you: Learn how to safely use, store and throw away your medicines at www.disposemymeds.org.          This list is accurate as of 2/13/18  8:20 AM.  Always use your most recent med list.                   Brand Name Dispense Instructions for use Diagnosis    * albuterol 108 (90 BASE) MCG/ACT Inhaler    VENTOLIN HFA    54 g    SHAKE WELL, THEN INHALE 2 PUFFS INTO THE LUNGS EVERY 6 HOURS AS NEEDED FOR SHORTNESS OF BREATH/DYSPNEA    Moderate persistent asthma without complication       * albuterol (2.5 MG/3ML) 0.083% neb solution     3 Box    Take 1 vial (2.5 mg) by nebulization every 4 hours as needed for shortness of breath / dyspnea    Moderate persistent asthma without complication       beclomethasone 80 MCG/ACT Inhaler    QVAR    3 Inhaler    Inhale 2 puffs into the lungs 2 times daily    Moderate persistent asthma without complication       clobetasol 0.05 % cream    TEMOVATE    60 g    Apply sparingly to affected area twice daily as needed.  Do not apply to face.    Dyshidrotic eczema        fluticasone 50 MCG/ACT spray    FLONASE    3 Bottle    Spray 2 sprays into both nostrils daily    Other acute sinusitis       ipratropium - albuterol 0.5 mg/2.5 mg/3 mL 0.5-2.5 (3) MG/3ML neb solution    DUONEB    1 Box    Take 1 vial (3 mLs) by nebulization every 6 hours as needed for shortness of breath / dyspnea or wheezing    Moderate persistent asthma without complication       levothyroxine 25 MCG tablet    SYNTHROID/LEVOTHROID    90 tablet    Take 1 tablet (25 mcg) by mouth daily    Acquired hypothyroidism       lisinopril 10 MG tablet    PRINIVIL/ZESTRIL    90 tablet    Take 1 tablet (10 mg) by mouth daily    Essential hypertension with goal blood pressure less than 140/90       loratadine 10 MG tablet    CLARITIN    90 tablet    TAKE ONE TABLET BY MOUTH EVERY DAY    Moderate persistent asthma without complication       omeprazole 40 MG capsule    priLOSEC    30 capsule    Take 1 capsule (40 mg) by mouth daily Take 30-60 minutes before a meal.    Gastroesophageal reflux disease without esophagitis       predniSONE 20 MG tablet    DELTASONE    10 tablet    Take 2 tablets (40 mg) by mouth daily for 5 days If in the red zone    Moderate persistent asthma without complication       * Notice:  This list has 2 medication(s) that are the same as other medications prescribed for you. Read the directions carefully, and ask your doctor or other care provider to review them with you.

## 2018-02-13 NOTE — LETTER
My Asthma Action Plan  Name: Rose Mary Rose   YOB: 1965  Date: 2/13/2018   My doctor: REDD Tompkins CNP   My clinic: [facility]        My Control Medicine: Beclomethasone (QVar) -  80 mcg twice daily  My Rescue Medicine: Albuterol nebulizer solution as needed  Albuterol (Proair/Ventolin/Proventil) inhaler as needed  My Oral Steroid Medicine: prednisone My Asthma Severity: moderate persistent  Avoid your asthma triggers: upper respiratory infections and cold air               GREEN ZONE   Good Control    I feel good    No cough or wheeze    Can work, sleep and play without asthma symptoms       Take your asthma control medicine every day.     1. If exercise triggers your asthma, take your rescue medication    15 minutes before exercise or sports, and    During exercise if you have asthma symptoms  2. Spacer to use with inhaler: If you have a spacer, make sure to use it with your inhaler             YELLOW ZONE Getting Worse  I have ANY of these:    I do not feel good    Cough or wheeze    Chest feels tight    Wake up at night   1. Keep taking your Green Zone medications  2. Start taking your rescue medicine:    every 20 minutes for up to 1 hour. Then every 4 hours for 24-48 hours.  3. If you stay in the Yellow Zone for more than 12-24 hours, contact your doctor.  4. If you do not return to the Green Zone in 12-24 hours or you get worse, start taking your oral steroid medicine if prescribed by your provider.           RED ZONE Medical Alert - Get Help  I have ANY of these:    I feel awful    Medicine is not helping    Breathing getting harder    Trouble walking or talking    Nose opens wide to breathe       1. Take your rescue medicine NOW  2. If your provider has prescribed an oral steroid medicine, start taking it NOW  3. Call your doctor NOW  4. If you are still in the Red Zone after 20 minutes and you have not reached your doctor:    Take your rescue medicine again  and    Call 911 or go to the emergency room right away    See your regular doctor within 2 weeks of an Emergency Room or Urgent Care visit for follow-up treatment.        Electronically signed by: Soledad Bauman, February 13, 2018    Annual Reminders:  Meet with Asthma Educator,  Flu Shot in the Fall, consider Pneumonia Vaccination for patients with asthma (aged 19 and older).    Pharmacy:    [id number] - [place], [place] - [address] [place] [address] [place] [address] [facility] [place] [address] [facility]  [facility] - [place] - [address]                    Asthma Triggers  How To Control Things That Make Your Asthma Worse    Triggers are things that make your asthma worse.  Look at the list below to help you find your triggers and what you can do about them.  You can help prevent asthma flare-ups by staying away from your triggers.      Trigger                                                          What you can do   Cigarette Smoke  Tobacco smoke can make asthma worse. Do not allow smoking in your home, car or around you.  Be sure no one smokes at a child s day care or school.  If you smoke, ask your health care provider for ways to help you quit.  Ask family members to quit too.  Ask your health care provider for a referral to Quit Plan to help you quit smoking, or call 3-942-226-PLAN.     Colds, Flu, Bronchitis  These are common triggers of asthma. Wash your hands often.  Don t touch your eyes, nose or mouth.  Get a flu shot every year.     Dust Mites  These are tiny bugs that live in cloth or carpet. They are too small to see. Wash sheets and blankets in hot water every week.   Encase pillows and mattress in dust mite proof covers.  Avoid having carpet if you can. If you have carpet, vacuum weekly.   Use a dust mask and HEPA vacuum.   Pollen and Outdoor Mold  Some people are allergic to trees, grass, or weed  pollen, or molds. Try to keep your windows closed.  Limit time out doors when pollen count is high.   Ask you health care provider about taking medicine during allergy season.     Animal Dander  Some people are allergic to skin flakes, urine or saliva from pets with fur or feathers. Keep pets with fur or feathers out of your home.    If you can t keep the pet outdoors, then keep the pet out of your bedroom.  Keep the bedroom door closed.  Keep pets off cloth furniture and away from stuffed toys.     Mice, Rats, and Cockroaches  Some people are allergic to the waste from these pests.   Cover food and garbage.  Clean up spills and food crumbs.  Store grease in the refrigerator.   Keep food out of the bedroom.   Indoor Mold  This can be a trigger if your home has high moisture. Fix leaking faucets, pipes, or other sources of water.   Clean moldy surfaces.  Dehumidify basement if it is damp and smelly.   Smoke, Strong Odors, and Sprays  These can reduce air quality. Stay away from strong odors and sprays, such as perfume, powder, hair spray, paints, smoke incense, paint, cleaning products, candles and new carpet.   Exercise or Sports  Some people with asthma have this trigger. Be active!  Ask your doctor about taking medicine before sports or exercise to prevent symptoms.    Warm up for 5-10 minutes before and after sports or exercise.     Other Triggers of Asthma  Cold air:  Cover your nose and mouth with a scarf.  Sometimes laughing or crying can be a trigger.  Some medicines and food can trigger asthma.

## 2018-02-13 NOTE — PATIENT INSTRUCTIONS
Thank you for choosing [facility].  You may be receiving a survey in the mail from Nave[employer] Clark regarding your visit today.  Please take a few minutes to complete and return the survey to let us know how we are doing.      If you have questions or concerns, please contact us via Disconnect or you can contact your care team at [phone].    Our Clinic hours are:  Monday 6:40 am  to 7:00 pm  Tuesday -Friday 6:40 am to 5:00 pm    The [place] outpatient lab hours are:  Monday - Friday 6:10 am to 4:45 pm  Saturdays 7:00 am to 11:00 am  Appointments are required, call [phone]    If you have clinical questions after hours or would like to schedule an appointment,  call the clinic at [phone].

## 2018-02-13 NOTE — PROGRESS NOTES
"  SUBJECTIVE:   Rose Mary Rose is a 52 year old female who presents to clinic today for the following health issues:      ENT Symptoms             Symptoms: cc Present Absent Comment   Fever/Chills   X    Fatigue   X    Muscle Aches   X    Eye Irritation   X    Sneezing   X    Nasal Kemal/Drg  X  DRAINAGE   Sinus Pressure/Pain   X    Loss of smell   X    Dental pain   X    Sore Throat   X    Swollen Glands   X    Ear Pain/Fullness   X    Cough  X  WORSE LAST WEEK, BETTER NOW   Wheeze  X  SLOWLY GETTING BETTER    Chest Pain   X    Shortness of breath  X  BETTER TODAY    Rash   X    Other         Symptom duration:  1 MONTH    Symptom severity:  MILD - much improved   Treatments tried:  Doxycycline, Prednisone- helped   Contacts:  none, patient has slowly gotten better, wants to make sure her lungs sound ok, and would like a rx for prednisone for just in case, she is leaving to go on a cruise this Saturday          Problem list and histories reviewed & adjusted, as indicated.  Additional history: as documented    Reviewed and updated as needed this visit by clinical staff  Tobacco  Allergies  Meds  Med Hx  Surg Hx  Fam Hx  Soc Hx      Reviewed and updated as needed this visit by Provider         ROS:  Constitutional, HEENT, cardiovascular, pulmonary, gi and gu systems are negative, except as otherwise noted.    OBJECTIVE:     /72 (BP Location: Left arm, Patient Position: Chair, Cuff Size: Adult Regular)  Pulse 67  Temp 97.9  F (36.6  C) (Tympanic)  Ht 5' 1.25\" (1.556 m)  Wt 166 lb 3.2 oz (75.4 kg)  LMP 04/06/2016  SpO2 96%  BMI 31.15 kg/m2  Body mass index is 31.15 kg/(m^2).  GENERAL: healthy, alert and no distress  HENT: ear canals and TM's normal, nose and mouth without ulcers or lesions  NECK: no adenopathy, no asymmetry, masses, or scars and thyroid normal to palpation  RESP: lungs clear to auscultation - no rales, rhonchi or wheezes  CV: regular rate and rhythm, normal S1 S2, no S3 or S4, no " murmur, click or rub, no peripheral edema and peripheral pulses strong      ASSESSMENT/PLAN:       ICD-10-CM    1. Moderate persistent asthma without complication J45.40 predniSONE (DELTASONE) 20 MG tablet     albuterol (2.5 MG/3ML) 0.083% neb solution     Symptoms are resolving.  Patient wanted to check in prior to leaving for her cruise later this week.  Prednisone given to have on hand - take if in the red zone - new asthma action plan given.  Take all inhalers and nebulizers on vacation.  Follow up with me as needed.      The risks, benefits and treatment options of prescribed medications or other treatments have been discussed with the patient. The patient verbalized their understanding and should call or follow up if no improvement or if they develop further problems.    REDD Tompkins [facility]

## 2018-02-13 NOTE — NURSING NOTE
"Chief Complaint   Patient presents with     URI       Initial /72 (BP Location: Left arm, Patient Position: Chair, Cuff Size: Adult Regular)  Pulse 67  Temp 97.9  F (36.6  C) (Tympanic)  Ht 5' 1.25\" (1.556 m)  Wt 166 lb 3.2 oz (75.4 kg)  LMP 04/06/2016  SpO2 96%  BMI 31.15 kg/m2 Estimated body mass index is 31.15 kg/(m^2) as calculated from the following:    Height as of this encounter: 5' 1.25\" (1.556 m).    Weight as of this encounter: 166 lb 3.2 oz (75.4 kg).  Medication Reconciliation: complete    "

## 2018-03-09 ENCOUNTER — OFFICE VISIT (OUTPATIENT)
Dept: FAMILY MEDICINE | Facility: CLINIC | Age: 53
End: 2018-03-09
Payer: COMMERCIAL

## 2018-03-09 VITALS
OXYGEN SATURATION: 96 % | SYSTOLIC BLOOD PRESSURE: 132 MMHG | WEIGHT: 163 LBS | DIASTOLIC BLOOD PRESSURE: 70 MMHG | HEART RATE: 85 BPM | TEMPERATURE: 98.5 F | BODY MASS INDEX: 30.55 KG/M2

## 2018-03-09 DIAGNOSIS — J45.40 MODERATE PERSISTENT ASTHMA WITHOUT COMPLICATION: ICD-10-CM

## 2018-03-09 DIAGNOSIS — J06.9 VIRAL URI: Primary | ICD-10-CM

## 2018-03-09 PROCEDURE — 99213 OFFICE O/P EST LOW 20 MIN: CPT | Performed by: NURSE PRACTITIONER

## 2018-03-09 RX ORDER — PREDNISONE 20 MG/1
40 TABLET ORAL DAILY
Qty: 10 TABLET | Refills: 0 | Status: SHIPPED | OUTPATIENT
Start: 2018-03-09 | End: 2018-03-14

## 2018-03-09 RX ORDER — AZITHROMYCIN 250 MG/1
TABLET, FILM COATED ORAL
Qty: 6 TABLET | Refills: 0 | Status: SHIPPED | OUTPATIENT
Start: 2018-03-09 | End: 2018-04-20

## 2018-03-09 NOTE — PATIENT INSTRUCTIONS
Thank you for choosing [facility].  You may be receiving a survey in the mail from Naveed Clark regarding your visit today.  Please take a few minutes to complete and return the survey to let us know how we are doing.      If you have questions or concerns, please contact us via [employer] or you can contact your care team at [phone].    Our Clinic hours are:  Monday 6:40 am  to 7:00 pm  Tuesday -Friday 6:40 am to 5:00 pm    The [place] outpatient lab hours are:  Monday - Friday 6:10 am to 4:45 pm  Saturdays 7:00 am to 11:00 am  Appointments are required, call [phone]    If you have clinical questions after hours or would like to schedule an appointment,  call the clinic at [phone].

## 2018-03-09 NOTE — MR AVS SNAPSHOT
After Visit Summary   3/9/2018    Rose Mary Rose    MRN: [id number]           Patient Information     Date Of Birth          1965        Visit Information        Provider Department      3/9/2018 9:20 AM Soledad Bauman APRN CNP [facility]        Today's Diagnoses     Viral URI    -  1    Moderate persistent asthma without complication          Care Instructions          Thank you for choosing [facility].  You may be receiving a survey in the mail from [facility] regarding your visit today.  Please take a few minutes to complete and return the survey to let us know how we are doing.      If you have questions or concerns, please contact us via Context app or you can contact your care team at [phone].    Our Clinic hours are:  Monday 6:40 am  to 7:00 pm  Tuesday -Friday 6:40 am to 5:00 pm    The [place] outpatient lab hours are:  Monday - Friday 6:10 am to 4:45 pm  Saturdays 7:00 am to 11:00 am  Appointments are required, call [phone]    If you have clinical questions after hours or would like to schedule an appointment,  call the clinic at [phone].            Follow-ups after your visit        Who to contact     If you have questions or need follow up information about today's clinic visit or your schedule please contact [facility] directly at [phone].  Normal or non-critical lab and imaging results will be communicated to you by MyChart, letter or phone within 4 business days after the clinic has received the results. If you do not hear from us within 7 days, please contact the clinic through [employer] or phone. If you have a critical or abnormal lab result, we will notify you by phone as soon as possible.  Submit refill requests through Context app or call your pharmacy and they will forward the refill request to us. Please allow 3 business days for your refill to be completed.          Additional Information About Your Visit         S[employer]anche Information     [employer] gives you secure access to your electronic health record. If you see a primary care provider, you can also send messages to your care team and make appointments. If you have questions, please call your primary care clinic.  If you do not have a primary care provider, please call [phone] and they will assist you.        Care EveryWhere ID     This is your Care EveryWhere ID. This could be used by other organizations to access your [facility] medical records  [id number]        Your Vitals Were     Pulse Temperature Last Period Pulse Oximetry BMI (Body Mass Index)       85 98.5  F (36.9  C) (Tympanic) 04/06/2016 96% 30.55 kg/m2        Blood Pressure from Last 3 Encounters:   03/09/18 132/70   02/13/18 127/72   01/26/18 135/74    Weight from Last 3 Encounters:   03/09/18 163 lb (73.9 kg)   02/13/18 166 lb 3.2 oz (75.4 kg)   01/26/18 164 lb (74.4 kg)              Today, you had the following     No orders found for display         Today's Medication Changes          These changes are accurate as of 3/9/18 11:10 AM.  If you have any questions, ask your nurse or doctor.               Start taking these medicines.        Dose/Directions    azithromycin 250 MG tablet   Commonly known as:  ZITHROMAX   Used for:  Viral URI, Moderate persistent asthma without complication        Two tablets first day, then one tablet daily for four days.   Quantity:  6 tablet   Refills:  0       predniSONE 20 MG tablet   Commonly known as:  DELTASONE   Used for:  Viral URI, Moderate persistent asthma without complication        Dose:  40 mg   Take 2 tablets (40 mg) by mouth daily for 5 days   Quantity:  10 tablet   Refills:  0            Where to get your medicines      These medications were sent to [facility] [place], [place] - [address]  [address] [address]     Phone:  [phone]     azithromycin 250 MG tablet    predniSONE 20 MG tablet                Primary Care  Provider Office Phone # Fax #    Soledad Bauman, [facility] [phone] [address] [address]        Equal Access to Services     EDDA IQBAL : Hadii leelee ku hadrogero Soomaali, waaxda luqadaha, qaybta kaalmada adeegyada, waxdarron vazquezn kendrickmax ordoñez alejandro la. So [facility] [phone].    ATENCIÓN: Si habla español, tiene a jones disposición servicios gratuitos de asistencia lingüística. Llame al [phone].    We comply with applicable federal civil rights laws and [place] laws. We do not discriminate on the basis of race, color, national origin, age, disability, sex, sexual orientation, or gender identity.            Thank you!     Thank you for choosing [facility]  for your care. Our goal is always to provide you with excellent care. Hearing back from our patients is one way we can continue to improve our services. Please take a few minutes to complete the written survey that you may receive in the mail after your visit with us. Thank you!             Your Updated Medication List - Protect others around you: Learn how to safely use, store and throw away your medicines at www.disposemymeds.org.          This list is accurate as of 3/9/18 11:10 AM.  Always use your most recent med list.                   Brand Name Dispense Instructions for use Diagnosis    * albuterol 108 (90 BASE) MCG/ACT Inhaler    VENTOLIN HFA    54 g    SHAKE WELL, THEN INHALE 2 PUFFS INTO THE LUNGS EVERY 6 HOURS AS NEEDED FOR SHORTNESS OF BREATH/DYSPNEA    Moderate persistent asthma without complication       * albuterol (2.5 MG/3ML) 0.083% neb solution     3 Box    Take 1 vial (2.5 mg) by nebulization every 4 hours as needed for shortness of breath / dyspnea    Moderate persistent asthma without complication       azithromycin 250 MG tablet    ZITHROMAX    6 tablet    Two tablets first day, then one tablet daily for four days.    Viral URI, Moderate persistent asthma without  complication       beclomethasone 80 MCG/ACT Inhaler    QVAR    3 Inhaler    Inhale 2 puffs into the lungs 2 times daily    Moderate persistent asthma without complication       clobetasol 0.05 % cream    TEMOVATE    60 g    Apply sparingly to affected area twice daily as needed.  Do not apply to face.    Dyshidrotic eczema       fluticasone 50 MCG/ACT spray    FLONASE    3 Bottle    Spray 2 sprays into both nostrils daily    Other acute sinusitis       ipratropium - albuterol 0.5 mg/2.5 mg/3 mL 0.5-2.5 (3) MG/3ML neb solution    DUONEB    1 Box    Take 1 vial (3 mLs) by nebulization every 6 hours as needed for shortness of breath / dyspnea or wheezing    Moderate persistent asthma without complication       levothyroxine 25 MCG tablet    SYNTHROID/LEVOTHROID    90 tablet    Take 1 tablet (25 mcg) by mouth daily    Acquired hypothyroidism       lisinopril 10 MG tablet    PRINIVIL/ZESTRIL    90 tablet    Take 1 tablet (10 mg) by mouth daily    Essential hypertension with goal blood pressure less than 140/90       loratadine 10 MG tablet    CLARITIN    90 tablet    TAKE ONE TABLET BY MOUTH EVERY DAY    Moderate persistent asthma without complication       omeprazole 40 MG capsule    priLOSEC    30 capsule    Take 1 capsule (40 mg) by mouth daily Take 30-60 minutes before a meal.    Gastroesophageal reflux disease without esophagitis       predniSONE 20 MG tablet    DELTASONE    10 tablet    Take 2 tablets (40 mg) by mouth daily for 5 days    Viral URI, Moderate persistent asthma without complication       * Notice:  This list has 2 medication(s) that are the same as other medications prescribed for you. Read the directions carefully, and ask your doctor or other care provider to review them with you.

## 2018-03-09 NOTE — PROGRESS NOTES
SUBJECTIVE:   Rose Mary Rose is a 52 year old female who presents to clinic today for the following health issues:      ENT Symptoms             Symptoms: cc Present Absent Comment   Fever/Chills   x Unsure    Fatigue  x  A little    Muscle Aches   x    Eye Irritation   x    Sneezing   x    Nasal Kemal/Drg   x    Sinus Pressure/Pain   x    Loss of smell   x    Dental pain   x    Sore Throat  x  Thinks it is from coughing, she is not concerned about Strep   Swollen Glands   x    Ear Pain/Fullness   x    Cough x x     Wheeze  x     Chest Pain   x    Shortness of breath  x     Rash   x    Other  x  Headaches- little      Symptom duration:  two days   Symptom severity:  mild   Treatments tried:  inhalers and nebs   Contacts:  mom has bronchitis and people at work          Problem list and histories reviewed & adjusted, as indicated.  Additional history: as documented    Reviewed and updated as needed this visit by clinical staff  Allergies  Meds       Reviewed and updated as needed this visit by Provider         ROS:  Constitutional, HEENT, cardiovascular, pulmonary, gi and gu systems are negative, except as otherwise noted.    OBJECTIVE:     /70  Pulse 85  Temp 98.5  F (36.9  C) (Tympanic)  Wt 163 lb (73.9 kg)  LMP 04/06/2016  SpO2 96%  BMI 30.55 kg/m2  Body mass index is 30.55 kg/(m^2).  GENERAL: healthy, alert and no distress  HENT: ear canals and TM's normal, nose and mouth without ulcers or lesions  NECK: no adenopathy, no asymmetry, masses, or scars and thyroid normal to palpation  RESP: lungs clear to auscultation - no rales, rhonchi or wheezes  CV: regular rate and rhythm, normal S1 S2, no S3 or S4, no murmur, click or rub, no peripheral edema and peripheral pulses strong      ASSESSMENT/PLAN:       ICD-10-CM    1. Viral URI J06.9 azithromycin (ZITHROMAX) 250 MG tablet    B97.89 predniSONE (DELTASONE) 20 MG tablet   2. Moderate persistent asthma without complication J45.40 azithromycin  (ZITHROMAX) 250 MG tablet     predniSONE (DELTASONE) 20 MG tablet     Likely viral URI currently.  Patient has had multiple asthma exacerbations this winter.  Advised to neb q4 hours while sick.  May use over-the-counter therapies if helpful.  If symptoms worsening early next week, she may start the antibiotics and prednisone that I gave her in clinic today.  Follow up as needed.      The risks, benefits and treatment options of prescribed medications or other treatments have been discussed with the patient. The patient verbalized their understanding and should call or follow up if no improvement or if they develop further problems.    REDD Tompkins [facility]

## 2018-03-09 NOTE — NURSING NOTE
"Chief Complaint   Patient presents with     Cough     sick for two days       Initial /70  Pulse 85  Temp 98.5  F (36.9  C) (Tympanic)  Wt 163 lb (73.9 kg)  LMP 04/06/2016  SpO2 96%  BMI 30.55 kg/m2 Estimated body mass index is 30.55 kg/(m^2) as calculated from the following:    Height as of 2/13/18: 5' 1.25\" (1.556 m).    Weight as of this encounter: 163 lb (73.9 kg).  Medication Reconciliation: complete    Carolyne Mendoza CMA    "

## 2018-03-24 ENCOUNTER — MYC MEDICAL ADVICE (OUTPATIENT)
Dept: FAMILY MEDICINE | Facility: CLINIC | Age: 53
End: 2018-03-24

## 2018-03-24 DIAGNOSIS — J45.40 MODERATE PERSISTENT ASTHMA WITHOUT COMPLICATION: ICD-10-CM

## 2018-03-24 DIAGNOSIS — E03.9 ACQUIRED HYPOTHYROIDISM: ICD-10-CM

## 2018-03-26 RX ORDER — LEVOTHYROXINE SODIUM 25 UG/1
25 TABLET ORAL DAILY
Qty: 90 TABLET | Refills: 3 | Status: SHIPPED | OUTPATIENT
Start: 2018-03-26 | End: 2019-01-28

## 2018-03-26 RX ORDER — ALBUTEROL SULFATE 90 UG/1
AEROSOL, METERED RESPIRATORY (INHALATION)
Qty: 54 G | Refills: 2 | Status: SHIPPED | OUTPATIENT
Start: 2018-03-26 | End: 2018-06-05

## 2018-03-26 NOTE — TELEPHONE ENCOUNTER
Patient requesting refills be sent to her mail order.   They all had refills on them, so resent with remaining refills from previous Rx's for her per protocol.   Dee Lange RNC

## 2018-04-20 ENCOUNTER — OFFICE VISIT (OUTPATIENT)
Dept: FAMILY MEDICINE | Facility: CLINIC | Age: 53
End: 2018-04-20
Payer: COMMERCIAL

## 2018-04-20 VITALS
SYSTOLIC BLOOD PRESSURE: 133 MMHG | OXYGEN SATURATION: 98 % | WEIGHT: 166.2 LBS | HEART RATE: 65 BPM | DIASTOLIC BLOOD PRESSURE: 77 MMHG | BODY MASS INDEX: 31.38 KG/M2 | RESPIRATION RATE: 16 BRPM | TEMPERATURE: 98.3 F | HEIGHT: 61 IN

## 2018-04-20 DIAGNOSIS — A63.0 VULVAR WARTS: Primary | ICD-10-CM

## 2018-04-20 DIAGNOSIS — Z71.89 ADVANCED DIRECTIVES, COUNSELING/DISCUSSION: ICD-10-CM

## 2018-04-20 PROCEDURE — 56501 DESTROY VULVA LESIONS SIM: CPT | Performed by: FAMILY MEDICINE

## 2018-04-20 NOTE — PROGRESS NOTES
"  SUBJECTIVE:   Rose Mary Rose is a 53 year old female who presents to clinic today for the following health issues:      Concern - vaginal wart  Onset: 2 months    Description:   Irritated, small in diameter; patient has had vaginal warts in the past    Intensity: NA    Progression of Symptoms:  same    Accompanying Signs & Symptoms:  NA    Previous history of similar problem:   yes    Precipitating factors:   Worsened by: NA    Alleviating factors:  Improved by: NA    Therapies Tried and outcome: NA    Smoking off and on, trying to quit.    Problem list and histories reviewed & adjusted, as indicated.  Additional history: as documented      Reviewed and updated as needed this visit by clinical staff  Tobacco  Allergies  Meds  Med Hx  Surg Hx  Fam Hx  Soc Hx      Reviewed and updated as needed this visit by Provider         ROS:  CONSTITUTIONAL: NEGATIVE for fever, chills, change in weight  : no vaginal discharge    OBJECTIVE:     /77  Pulse 65  Temp 98.3  F (36.8  C) (Tympanic)  Resp 16  Ht 5' 1.25\" (1.556 m)  Wt 166 lb 3.2 oz (75.4 kg)  LMP 04/06/2016  SpO2 98%  BMI 31.15 kg/m2  Body mass index is 31.15 kg/(m^2).  GENERAL: healthy, alert and no distress   (female): normal female external genitalia, normal urethral meatus  and vaginal mucosa pink, moist, well rugated, verrucous lesion on right lower labia,  The lesion was frozen to an ice ball of 3mm beyond the lesion and allowed to completely thaw and the freeze/thaw cycle was repeated two more times.      Diagnostic Test Results:  none     ASSESSMENT/PLAN:       Rose Mary was seen today for derm problem and health maintenance.    Diagnoses and all orders for this visit:    Vulvar wart: one lesion froze with liquid nitrogen  -     DESTRUCT PREMALIGNANT LESION, FIRST    Advanced directives, counseling/discussion        Patient Instructions           Thank you for choosing [facility].  You may be receiving a survey in the mail " from [employer] regarding your visit today.  Please take a few minutes to complete and return the survey to let us know how we are doing.      If you have questions or concerns, please contact us via [phone] or you can contact your care team at [phone].    Our Clinic hours are:  Monday 6:40 am  to 7:00 pm  Tuesday -Friday 6:40 am to 5:00 pm    The [place] outpatient lab hours are:  Monday - Friday 6:10 am to 4:45 pm  Saturdays 7:00 am to 11:00 am  Appointments are required, call [phone]    If you have clinical questions after hours or would like to schedule an appointment,  call the clinic at [phone].  Genital Warts (Condyloma)     Ask your healthcare provider about the HPV vaccine.     Genital warts (also called condyloma) are caused by a virus that is often transmitted sexually. This virus is known as human papillomavirus  (HPV). The warts are often so tiny that they are hard to see. But even tiny warts can cause big trouble, especially in women. Genital warts can cause cell changes that can lead to genital cancers such as cervical cancer. Warts can even be passed to babies during childbirth.  Note: Latex condoms may help protect against genital warts. But condoms don t cover all the areas that can get infected. That means condoms may not protect you completely.   What are the symptoms of genital warts?  Genital warts can be flat. Or they can be raised and look like tiny cauliflowers. They can grow on the penis, vagina, or cervix. They can also grow in and around the rectum, and even in the throat. You can have the virus for many months before the warts appear. Or you may have the virus but never develop visible warts. Once warts form, they are often too small to be noticed. That s why you need regular exams by your healthcare provider. He or she can find tiny warts and can check your cells for changes that mean the virus is present.  What is the treatment of genital warts?  Genital warts tend to  grow with time. The smaller the warts are, the easier they are to remove. So don t delay. Warts are most often removed with chemicals. Sometimes they re frozen off with liquid nitrogen. Warts may also be removed with heat or laser. More than one treatment may be needed. Never try to treat genital warts yourself.  How are genital warts prevented?  To prevent genital warts, consider getting vaccinated. Your healthcare provider can tell you if the vaccine is right for you. Also know your partner s sexual history. Even if someone doesn t have visible warts, he or she can still transmit the virus. Protect yourself by using latex condoms. And get regular medical exams. In women, regular Pap smears can help detect changes caused by genital warts and catch any signs of cervical cancer early. Also, ask your healthcare provider about the HPV vaccine.  Resources  American [employer] STD Hotline   713.386.1681   www.ashastd.org  Centers for Disease Control and Prevention   558.182.9481  www.cdc.gov/std   Date Last Reviewed: 1/1/2017 2000-2017 [employer]. [address] [place] [address]. All rights reserved. This information is not intended as a substitute for professional medical care. Always follow your healthcare professional's instructions.            Roberto Bazzi MD  [facility]

## 2018-04-20 NOTE — PATIENT INSTRUCTIONS
Thank you for choosing [facility]  You may be receiving a survey in the mail from [employer] Clark regarding your visit today.  Please take a few minutes to complete and return the survey to let us know how we are doing.      If you have questions or concerns, please contact us via [employer] or you can contact your care team at [phone].    Our Clinic hours are:  Monday 6:40 am  to 7:00 pm  Tuesday -Friday 6:40 am to 5:00 pm    The [place] outpatient lab hours are:  Monday - Friday 6:10 am to 4:45 pm  Saturdays 7:00 am to 11:00 am  Appointments are required, call [phone]    If you have clinical questions after hours or would like to schedule an appointment,  call the clinic at [phone].  Genital Warts (Condyloma)     Ask your healthcare provider about the HPV vaccine.     Genital warts (also called condyloma) are caused by a virus that is often transmitted sexually. This virus is known as human papillomavirus  (HPV). The warts are often so tiny that they are hard to see. But even tiny warts can cause big trouble, especially in women. Genital warts can cause cell changes that can lead to genital cancers such as cervical cancer. Warts can even be passed to babies during childbirth.  Note: Latex condoms may help protect against genital warts. But condoms don t cover all the areas that can get infected. That means condoms may not protect you completely.   What are the symptoms of genital warts?  Genital warts can be flat. Or they can be raised and look like tiny cauliflowers. They can grow on the penis, vagina, or cervix. They can also grow in and around the rectum, and even in the throat. You can have the virus for many months before the warts appear. Or you may have the virus but never develop visible warts. Once warts form, they are often too small to be noticed. That s why you need regular exams by your healthcare provider. He or she can find tiny warts and can check your cells for changes that  mean the virus is present.  What is the treatment of genital warts?  Genital warts tend to grow with time. The smaller the warts are, the easier they are to remove. So don t delay. Warts are most often removed with chemicals. Sometimes they re frozen off with liquid nitrogen. Warts may also be removed with heat or laser. More than one treatment may be needed. Never try to treat genital warts yourself.  How are genital warts prevented?  To prevent genital warts, consider getting vaccinated. Your healthcare provider can tell you if the vaccine is right for you. Also know your partner s sexual history. Even if someone doesn t have visible warts, he or she can still transmit the virus. Protect yourself by using latex condoms. And get regular medical exams. In women, regular Pap smears can help detect changes caused by genital warts and catch any signs of cervical cancer early. Also, ask your healthcare provider about the HPV vaccine.  Resources  American [employer] Health Association STD Hotline   457.264.1073   www.ashastd.org  Centers for Disease Control and Prevention   544.381.4732  www.cdc.gov/std   Date Last Reviewed: 1/1/2017 2000-2017 [employer]. [address] [place] [address], [place] [address]. All rights reserved. This information is not intended as a substitute for professional medical care. Always follow your healthcare professional's instructions.

## 2018-04-20 NOTE — NURSING NOTE
"Chief Complaint   Patient presents with     Derm Problem     vaginal wart; x 2 months       Initial /77  Pulse 65  Temp 98.3  F (36.8  C) (Tympanic)  Resp 16  Ht 5' 1.25\" (1.556 m)  Wt 166 lb 3.2 oz (75.4 kg)  LMP 04/06/2016  SpO2 98%  BMI 31.15 kg/m2 Estimated body mass index is 31.15 kg/(m^2) as calculated from the following:    Height as of this encounter: 5' 1.25\" (1.556 m).    Weight as of this encounter: 166 lb 3.2 oz (75.4 kg).  Medication Reconciliation: complete  "

## 2018-04-20 NOTE — MR AVS SNAPSHOT
After Visit Summary   4/20/2018    Rose Mary Rose    MRN: [id number]           Patient Information     Date Of Birth          1965        Visit Information        Provider Department      4/20/2018 2:40 PM Roberto Bazzi MD [facility]        Today's Diagnoses     Vulvar warts    -  1    Advanced directives, counseling/discussion          Care Instructions          Thank you for choosing [facility].  You may be receiving a survey in the mail from [facility] regarding your visit today.  Please take a few minutes to complete and return the survey to let us know how we are doing.      If you have questions or concerns, please contact us via [employer] or you can contact your care team at [phone].    Our Clinic hours are:  Monday 6:40 am  to 7:00 pm  Tuesday -Friday 6:40 am to 5:00 pm    The [place] outpatient lab hours are:  Monday - Friday 6:10 am to 4:45 pm  Saturdays 7:00 am to 11:00 am  Appointments are required, call [phone]    If you have clinical questions after hours or would like to schedule an appointment,  call the clinic at [phone].  Genital Warts (Condyloma)     Ask your healthcare provider about the HPV vaccine.     Genital warts (also called condyloma) are caused by a virus that is often transmitted sexually. This virus is known as human papillomavirus  (HPV). The warts are often so tiny that they are hard to see. But even tiny warts can cause big trouble, especially in women. Genital warts can cause cell changes that can lead to genital cancers such as cervical cancer. Warts can even be passed to babies during childbirth.  Note: Latex condoms may help protect against genital warts. But condoms don t cover all the areas that can get infected. That means condoms may not protect you completely.   What are the symptoms of genital warts?  Genital warts can be flat. Or they can be raised and look like tiny cauliflowers. They can grow on the  penis, vagina, or cervix. They can also grow in and around the rectum, and even in the throat. You can have the virus for many months before the warts appear. Or you may have the virus but never develop visible warts. Once warts form, they are often too small to be noticed. That s why you need regular exams by your healthcare provider. He or she can find tiny warts and can check your cells for changes that mean the virus is present.  What is the treatment of genital warts?  Genital warts tend to grow with time. The smaller the warts are, the easier they are to remove. So don t delay. Warts are most often removed with chemicals. Sometimes they re frozen off with liquid nitrogen. Warts may also be removed with heat or laser. More than one treatment may be needed. Never try to treat genital warts yourself.  How are genital warts prevented?  To prevent genital warts, consider getting vaccinated. Your healthcare provider can tell you if the vaccine is right for you. Also know your partner s sexual history. Even if someone doesn t have visible warts, he or she can still transmit the virus. Protect yourself by using latex condoms. And get regular medical exams. In women, regular Pap smears can help detect changes caused by genital warts and catch any signs of cervical cancer early. Also, ask your healthcare provider about the HPV vaccine.  Resources  American [employer] Association STD Hotline   135.178.3005   www.ashastd.org  Centers for Disease Control and Prevention   265.859.8570  www.cdc.gov/std   Date Last Reviewed: 1/1/2017 2000-2017 [employer]. [address] [place] [address], [place] [address]. All rights reserved. This information is not intended as a substitute for professional medical care. Always follow your healthcare professional's instructions.                Follow-ups after your visit        Your next 10 appointments already scheduled     Jun 04, 2018   Procedure with Nakul Antonio MD  "  [facility] ([facility])    [address] [facility] [address]   [phone]           The medical center is located at [address] [facility]. (between [address] and [address] in [place], [place] miles north of [place]).            Jun 05, 2018  8:00 AM CDT   Ayana Physical Adult with REDD Martinez CNP   [facility] ([facility])    [address] [id number] [phone]              Who to contact     If you have questions or need follow up information about today's clinic visit or your schedule please contact [facility] directly at [phone].  Normal or non-critical lab and imaging results will be communicated to you by [employer], letter or phone within 4 business days after the clinic has received the results. If you do not hear from us within 7 days, please contact the clinic through [email or web address] or phone. If you have a critical or abnormal lab result, we will notify you by phone as soon as possible.  Submit refill requests through [employer] or call your pharmacy and they will forward the refill request to us. Please allow 3 business days for your refill to be completed.          Additional Information About Your Visit        [employer] Information     [employer] gives you secure access to your electronic health record. If you see a primary care provider, you can also send messages to your care team and make appointments. If you have questions, please call your primary care clinic.  If you do not have a primary care provider, please call [phone] and they will assist you.        Care EveryWhere ID     This is your Care EveryWhere ID. This could be used by other organizations to access your [facility] medical records  [id number]        Your Vitals Were     Pulse Temperature Respirations Height Last Period Pulse Oximetry    65 98.3  F (36.8  C) (Tympanic) 16 5' 1.25\" (1.556 m) 04/06/2016 98%    BMI (Body " Mass Index)                   31.15 kg/m2            Blood Pressure from Last 3 Encounters:   04/20/18 133/77   03/09/18 132/70   02/13/18 127/72    Weight from Last 3 Encounters:   04/20/18 166 lb 3.2 oz (75.4 kg)   03/09/18 163 lb (73.9 kg)   02/13/18 166 lb 3.2 oz (75.4 kg)              We Performed the Following     DESTRUCT PREMALIGNANT LESION, FIRST        Primary Care Provider Office Phone # Fax #    Soledad Gonzalez Dawson Bauman, APRN [phone] [address] [facility] [facility] [address]        Equal Access to Services     EDDA IQBAL : Hadii leelee Fan, waaxda luqadaha, qaybta kaalmada adecm, patrick allen . So [facility] [phone].    ATENCIÓN: Si habla español, tiene a jones disposición servicios gratuitos de asistencia lingüística. Llame al [phone].    We comply with applicable federal civil rights laws and [place] laws. We do not discriminate on the basis of race, color, national origin, age, disability, sex, sexual orientation, or gender identity.            Thank you!     Thank you for choosing [facility]  for your care. Our goal is always to provide you with excellent care. Hearing back from our patients is one way we can continue to improve our services. Please take a few minutes to complete the written survey that you may receive in the mail after your visit with us. Thank you!             Your Updated Medication List - Protect others around you: Learn how to safely use, store and throw away your medicines at www.disposemymeds.org.          This list is accurate as of 4/20/18  2:53 PM.  Always use your most recent med list.                   Brand Name Dispense Instructions for use Diagnosis    * albuterol (2.5 MG/3ML) 0.083% neb solution     3 Box    Take 1 vial (2.5 mg) by nebulization every 4 hours as needed for shortness of breath / dyspnea    Moderate persistent asthma without complication       * albuterol 108 (90  Base) MCG/ACT Inhaler    VENTOLIN HFA    54 g    SHAKE WELL, THEN INHALE 2 PUFFS INTO THE LUNGS q 6 hr prn  SHORTNESS OF BREATH/DYSPNEA    Moderate persistent asthma without complication       beclomethasone 80 MCG/ACT Inhaler    QVAR    3 Inhaler    Inhale 2 puffs into the lungs 2 times daily    Moderate persistent asthma without complication       clobetasol 0.05 % cream    TEMOVATE    60 g    Apply sparingly to affected area twice daily as needed.  Do not apply to face.    Dyshidrotic eczema       ipratropium - albuterol 0.5 mg/2.5 mg/3 mL 0.5-2.5 (3) MG/3ML neb solution    DUONEB    1 Box    Take 1 vial (3 mLs) by nebulization every 6 hours as needed for shortness of breath / dyspnea or wheezing    Moderate persistent asthma without complication       levothyroxine 25 MCG tablet    SYNTHROID/LEVOTHROID    90 tablet    Take 1 tablet (25 mcg) by mouth daily    Acquired hypothyroidism       lisinopril 10 MG tablet    PRINIVIL/ZESTRIL    90 tablet    Take 1 tablet (10 mg) by mouth daily    Essential hypertension with goal blood pressure less than 140/90       loratadine 10 MG tablet    CLARITIN    90 tablet    TAKE ONE TABLET BY MOUTH EVERY DAY    Moderate persistent asthma without complication       omeprazole 40 MG capsule    priLOSEC    30 capsule    Take 1 capsule (40 mg) by mouth daily Take 30-60 minutes before a meal.    Gastroesophageal reflux disease without esophagitis       * Notice:  This list has 2 medication(s) that are the same as other medications prescribed for you. Read the directions carefully, and ask your doctor or other care provider to review them with you.

## 2018-04-21 ASSESSMENT — ASTHMA QUESTIONNAIRES: ACT_TOTALSCORE: 23

## 2018-05-31 ENCOUNTER — ANESTHESIA EVENT (OUTPATIENT)
Dept: GASTROENTEROLOGY | Facility: CLINIC | Age: 53
End: 2018-05-31
Payer: COMMERCIAL

## 2018-05-31 ASSESSMENT — LIFESTYLE VARIABLES: TOBACCO_USE: 1

## 2018-06-04 ENCOUNTER — HOSPITAL ENCOUNTER (OUTPATIENT)
Facility: CLINIC | Age: 53
Discharge: HOME | End: 2018-06-04
Attending: SURGERY | Admitting: SURGERY
Payer: COMMERCIAL

## 2018-06-04 ENCOUNTER — SURGERY (OUTPATIENT)
Age: 53
End: 2018-06-04

## 2018-06-04 ENCOUNTER — ANESTHESIA (OUTPATIENT)
Dept: GASTROENTEROLOGY | Facility: CLINIC | Age: 53
End: 2018-06-04
Payer: COMMERCIAL

## 2018-06-04 VITALS
SYSTOLIC BLOOD PRESSURE: 110 MMHG | RESPIRATION RATE: 16 BRPM | DIASTOLIC BLOOD PRESSURE: 62 MMHG | HEIGHT: 61 IN | HEART RATE: 70 BPM | TEMPERATURE: 98.4 F | BODY MASS INDEX: 31.34 KG/M2 | WEIGHT: 166 LBS | OXYGEN SATURATION: 95 %

## 2018-06-04 LAB — COLONOSCOPY: NORMAL

## 2018-06-04 PROCEDURE — 45378 DIAGNOSTIC COLONOSCOPY: CPT | Performed by: SURGERY

## 2018-06-04 PROCEDURE — 25000125 ZZHC RX 250: Performed by: NURSE ANESTHETIST, CERTIFIED REGISTERED

## 2018-06-04 PROCEDURE — 25000125 ZZHC RX 250: Performed by: SURGERY

## 2018-06-04 PROCEDURE — G0105 COLORECTAL SCRN; HI RISK IND: HCPCS | Performed by: SURGERY

## 2018-06-04 PROCEDURE — 25000128 H RX IP 250 OP 636: Performed by: SURGERY

## 2018-06-04 PROCEDURE — 37000008 ZZH ANESTHESIA TECHNICAL FEE, 1ST 30 MIN: Performed by: SURGERY

## 2018-06-04 PROCEDURE — 25000128 H RX IP 250 OP 636: Performed by: NURSE ANESTHETIST, CERTIFIED REGISTERED

## 2018-06-04 RX ORDER — LIDOCAINE 40 MG/G
CREAM TOPICAL
Status: DISCONTINUED | OUTPATIENT
Start: 2018-06-04 | End: 2018-06-04 | Stop reason: HOSPADM

## 2018-06-04 RX ORDER — SODIUM CHLORIDE, SODIUM LACTATE, POTASSIUM CHLORIDE, CALCIUM CHLORIDE 600; 310; 30; 20 MG/100ML; MG/100ML; MG/100ML; MG/100ML
INJECTION, SOLUTION INTRAVENOUS CONTINUOUS
Status: DISCONTINUED | OUTPATIENT
Start: 2018-06-04 | End: 2018-06-04 | Stop reason: HOSPADM

## 2018-06-04 RX ORDER — LIDOCAINE HYDROCHLORIDE 10 MG/ML
INJECTION, SOLUTION INFILTRATION; PERINEURAL PRN
Status: DISCONTINUED | OUTPATIENT
Start: 2018-06-04 | End: 2018-06-04

## 2018-06-04 RX ORDER — ONDANSETRON 2 MG/ML
4 INJECTION INTRAMUSCULAR; INTRAVENOUS
Status: DISCONTINUED | OUTPATIENT
Start: 2018-06-04 | End: 2018-06-04 | Stop reason: HOSPADM

## 2018-06-04 RX ORDER — GLYCOPYRROLATE 0.2 MG/ML
INJECTION, SOLUTION INTRAMUSCULAR; INTRAVENOUS PRN
Status: DISCONTINUED | OUTPATIENT
Start: 2018-06-04 | End: 2018-06-04

## 2018-06-04 RX ORDER — PROPOFOL 10 MG/ML
INJECTION, EMULSION INTRAVENOUS PRN
Status: DISCONTINUED | OUTPATIENT
Start: 2018-06-04 | End: 2018-06-04

## 2018-06-04 RX ORDER — PROPOFOL 10 MG/ML
INJECTION, EMULSION INTRAVENOUS CONTINUOUS PRN
Status: DISCONTINUED | OUTPATIENT
Start: 2018-06-04 | End: 2018-06-04

## 2018-06-04 RX ADMIN — SODIUM CHLORIDE, POTASSIUM CHLORIDE, SODIUM LACTATE AND CALCIUM CHLORIDE: 600; 310; 30; 20 INJECTION, SOLUTION INTRAVENOUS at 06:47

## 2018-06-04 RX ADMIN — GLYCOPYRROLATE 0.1 MG: 0.2 INJECTION, SOLUTION INTRAMUSCULAR; INTRAVENOUS at 07:35

## 2018-06-04 RX ADMIN — GLYCOPYRROLATE 0.1 MG: 0.2 INJECTION, SOLUTION INTRAMUSCULAR; INTRAVENOUS at 07:36

## 2018-06-04 RX ADMIN — PROPOFOL 40 MG: 10 INJECTION, EMULSION INTRAVENOUS at 07:36

## 2018-06-04 RX ADMIN — LIDOCAINE HYDROCHLORIDE 1 ML: 10 INJECTION, SOLUTION EPIDURAL; INFILTRATION; INTRACAUDAL; PERINEURAL at 06:47

## 2018-06-04 RX ADMIN — LIDOCAINE HYDROCHLORIDE 50 MG: 10 INJECTION, SOLUTION INFILTRATION; PERINEURAL at 07:35

## 2018-06-04 RX ADMIN — SODIUM CHLORIDE, POTASSIUM CHLORIDE, SODIUM LACTATE AND CALCIUM CHLORIDE: 600; 310; 30; 20 INJECTION, SOLUTION INTRAVENOUS at 07:34

## 2018-06-04 RX ADMIN — PROPOFOL 200 MCG/KG/MIN: 10 INJECTION, EMULSION INTRAVENOUS at 07:35

## 2018-06-04 NOTE — H&P
53 year old year old female here for colonoscopy for family history of colon cancer.    Patient Active Problem List   Diagnosis     Obesity     Enlarged thyroid with 2mm cyst      Family history of colon cancer     CARDIOVASCULAR SCREENING; LDL GOAL LESS THAN 130     Family history of diabetes mellitus     GERD (gastroesophageal reflux disease)     Moderate persistent asthma     Prediabetes     NAFLD (nonalcoholic fatty liver disease)     HTN (hypertension)     Acquired hypothyroidism     Advanced directives, counseling/discussion       Past Medical History:   Diagnosis Date     Abnormal Papanicolaou smear of cervix and cervical HPV 2003    Cryo  (path results?)     Advanced directives, counseling/discussion 4/20/2018    Advance Care Planning 4/20/2018: ACP Review of Chart / Resources Provided:  Reviewed chart for advance care plan.  Rose Mary Rose has been provided information and resources to begin or update their advance care plan.  Added by Rachelle Buchanan        Eczema      Human papillomavirus in conditions classified elsewhere and of unspecified site     Genital warts       Past Surgical History:   Procedure Laterality Date     COLONOSCOPY  12/3/2012    Procedure: COLONOSCOPY;  Colonoscopy;  Surgeon: Darnell Siddiqui MD;  Location: [facility]     ESOPHAGOSCOPY, GASTROSCOPY, DUODENOSCOPY (EGD), COMBINED N/A 5/5/2017    Procedure: COMBINED ESOPHAGOSCOPY, GASTROSCOPY, DUODENOSCOPY (EGD), BIOPSY SINGLE OR MULTIPLE;  Gastroscopy;  Surgeon: Yonny Zambrano MD;  Location: [facility]     NO HISTORY OF SURGERY  8/2007       @[facility]@    No current outpatient prescriptions on file.       Allergies   Allergen Reactions     Penicillins      rash       Pt reports that she has been smoking Cigarettes.  She has a 6.25 pack-year smoking history. She has never used smokeless tobacco. She reports that she does not drink alcohol or use illicit drugs.    Exam:  /57  Temp 98.4  F (36.9  C) (Oral)  Resp 16  Ht 1.556 m (5'  "1.25\")  Wt 75.3 kg (166 lb)  LMP 04/06/2016  SpO2 95%  BMI 31.11 kg/m2    Awake, Alert OX3  Lungs - CTA bilaterally  CV - RRR, no murmurs, distal pulses intact  Abd - soft, non-distended, non-tender, +BS  Extr - No cyanosis or edema    A/P 53 year old year old female in need of colonoscopy for family history of colon cancer. Risks, benefits, alternatives, and complications were discussed including the possibility of perforation and the patient agreed to proceed.    Nakul Atnonio MD     "

## 2018-06-04 NOTE — ANESTHESIA CARE TRANSFER NOTE
Patient: Rose Mary Rose    Procedure(s):  colonoscopy - Wound Class: II-Clean Contaminated    Diagnosis: family history of colon cancer    screening  Diagnosis Additional Information: No value filed.    Anesthesia Type:   No value filed.     Note:  Airway :Room Air  Patient transferred to:Phase II  Handoff Report: Identifed the Patient, Identified the Reponsible Provider, Reviewed the pertinent medical history, Discussed the surgical course, Reviewed Intra-OP anesthesia mangement and issues during anesthesia, Set expectations for post-procedure period and Allowed opportunity for questions and acknowledgement of understanding      Vitals: (Last set prior to Anesthesia Care Transfer)    CRNA VITALS  6/4/2018 0716 - 6/4/2018 0748      6/4/2018             Pulse: 73    Ht Rate: 73    SpO2: 98 %                Electronically Signed By: REDD Quigley CRNA  June 4, 2018  7:48 AM

## 2018-06-04 NOTE — ANESTHESIA POSTPROCEDURE EVALUATION
Patient: Rose Mary Rose    Procedure(s):  colonoscopy - Wound Class: II-Clean Contaminated    Diagnosis:family history of colon cancer    screening  Diagnosis Additional Information: No value filed.    Anesthesia Type:  No value filed.    Note:  Anesthesia Post Evaluation    Patient location during evaluation: Phase 2  Patient participation: Able to fully participate in evaluation  Level of consciousness: awake  Pain management: adequate  Airway patency: patent  Cardiovascular status: acceptable and hemodynamically stable  Respiratory status: acceptable, room air and spontaneous ventilation  Hydration status: acceptable  PONV: none     Anesthetic complications: None          Last vitals:  Vitals:    06/04/18 0630   BP: 109/57   Resp: 16   Temp: 36.9  C (98.4  F)   SpO2: 95%         Electronically Signed By: REDD Quigley CRNA  June 4, 2018  7:42 AM

## 2018-06-05 ENCOUNTER — OFFICE VISIT (OUTPATIENT)
Dept: FAMILY MEDICINE | Facility: CLINIC | Age: 53
End: 2018-06-05
Payer: COMMERCIAL

## 2018-06-05 VITALS
TEMPERATURE: 98.2 F | DIASTOLIC BLOOD PRESSURE: 72 MMHG | WEIGHT: 164.4 LBS | SYSTOLIC BLOOD PRESSURE: 122 MMHG | BODY MASS INDEX: 30.25 KG/M2 | HEART RATE: 64 BPM | HEIGHT: 62 IN

## 2018-06-05 DIAGNOSIS — J45.40 MODERATE PERSISTENT ASTHMA WITHOUT COMPLICATION: ICD-10-CM

## 2018-06-05 DIAGNOSIS — I10 ESSENTIAL HYPERTENSION WITH GOAL BLOOD PRESSURE LESS THAN 140/90: ICD-10-CM

## 2018-06-05 DIAGNOSIS — K21.9 GASTROESOPHAGEAL REFLUX DISEASE WITHOUT ESOPHAGITIS: ICD-10-CM

## 2018-06-05 DIAGNOSIS — Z01.419 ENCOUNTER FOR GYNECOLOGICAL EXAMINATION WITHOUT ABNORMAL FINDING: Primary | ICD-10-CM

## 2018-06-05 PROCEDURE — 87624 HPV HI-RISK TYP POOLED RSLT: CPT | Performed by: NURSE PRACTITIONER

## 2018-06-05 PROCEDURE — G0145 SCR C/V CYTO,THINLAYER,RESCR: HCPCS | Performed by: NURSE PRACTITIONER

## 2018-06-05 PROCEDURE — 99396 PREV VISIT EST AGE 40-64: CPT | Performed by: NURSE PRACTITIONER

## 2018-06-05 RX ORDER — ALBUTEROL SULFATE 90 UG/1
2 AEROSOL, METERED RESPIRATORY (INHALATION) EVERY 6 HOURS PRN
Qty: 3 INHALER | Refills: 11 | Status: SHIPPED | OUTPATIENT
Start: 2018-06-05 | End: 2019-06-03

## 2018-06-05 RX ORDER — LISINOPRIL 10 MG/1
10 TABLET ORAL DAILY
Qty: 90 TABLET | Refills: 3 | Status: SHIPPED | OUTPATIENT
Start: 2018-06-05 | End: 2019-06-03

## 2018-06-05 RX ORDER — LORATADINE 10 MG/1
1 TABLET ORAL DAILY
Qty: 90 TABLET | Refills: 3 | Status: SHIPPED | OUTPATIENT
Start: 2018-06-05 | End: 2019-05-30

## 2018-06-05 RX ORDER — OMEPRAZOLE 40 MG/1
40 CAPSULE, DELAYED RELEASE ORAL DAILY
Qty: 30 CAPSULE | Refills: 11 | Status: SHIPPED | OUTPATIENT
Start: 2018-06-05 | End: 2019-07-17

## 2018-06-05 RX ORDER — IPRATROPIUM BROMIDE AND ALBUTEROL SULFATE 2.5; .5 MG/3ML; MG/3ML
1 SOLUTION RESPIRATORY (INHALATION) EVERY 6 HOURS PRN
Qty: 1 BOX | Refills: 3 | Status: SHIPPED | OUTPATIENT
Start: 2018-06-05 | End: 2019-06-03

## 2018-06-05 NOTE — NURSING NOTE
"Estimated body mass index is 30.56 kg/(m^2) as calculated from the following:    Height as of this encounter: 5' 1.5\" (1.562 m).    Weight as of this encounter: 164 lb 6.4 oz (74.6 kg).  BP Readings from Last 1 Encounters:   06/05/18 122/72   ]  BP cuff size:  regular  Do you feel safe in your environment?  Yes  Does the patient need any medication refills today? yes        "

## 2018-06-05 NOTE — PROGRESS NOTES
SUBJECTIVE:   CC: Rose Mary Rose is an 53 year old woman who presents for preventive health visit.   Chief Complaint   Patient presents with     Physical     physical, Not fasting, needs wellness screening form filled out      Medication Problem     patient states Qvar Mist is not carried by her mail order pharmacy any longer, they do carry the other type of Qvar.      Medication Problem     Patient needs Albuterol inhaler now ordered as Pro Air inorder to be covered by her insurance .       Healthy Habits:    Do you get at least three servings of calcium containing foods daily (dairy, green leafy vegetables, etc.)? NO    Amount of exercise or daily activities, outside of work: 7 day(s) per week, goes on walks for 20-30 mins     Problems taking medications regularly No    Medication side effects: No    Have you had an eye exam in the past two years? yes    Do you see a dentist twice per year? yes    Do you have sleep apnea, excessive snoring or daytime drowsiness?no      Hypertension Follow-up      Outpatient blood pressures are not being checked.    Low Salt Diet: no added salt    Asthma Follow-Up  Well controlled.  Needs Qvar changed for insurance purposes  ACT Total Scores 4/20/2018   ACT TOTAL SCORE -   ASTHMA ER VISITS -   ASTHMA HOSPITALIZATIONS -   ACT TOTAL SCORE (Goal Greater than or Equal to 20) 23   In the past 12 months, how many times did you visit the emergency room for your asthma without being admitted to the hospital? 0   In the past 12 months, how many times were you hospitalized overnight because of your asthma? 0       Recent asthma triggers that patient is dealing with: None        Today's PHQ-2 Score:   PHQ-2 ( 1999 [employer]) 6/5/2018 4/20/2018   Q1: Little interest or pleasure in doing things 0 0   Q2: Feeling down, depressed or hopeless 0 0   PHQ-2 Score 0 0   Q1: Little interest or pleasure in doing things - -   Q2: Feeling down, depressed or hopeless - -   PHQ-2 Score - -       Abuse:  "Current or Past(Physical, Sexual or Emotional)- No  Do you feel safe in your environment - Yes    Social History   Substance Use Topics     Smoking status: Former Smoker     Packs/day: 0.25     Years: 25.00     Types: Cigarettes     Quit date: 3/14/2016     Smokeless tobacco: Never Used     Alcohol use No      Comment: 1 beer per month, usually none     If you drink alcohol do you typically have >3 drinks per day or >7 drinks per week? No                     Reviewed orders with patient.  Reviewed health maintenance and updated orders accordingly - Yes          Pertinent mammograms are reviewed under the imaging tab.      History of abnormal Pap smear: YES - updated in Problem List and Health Maintenance accordingly. In her 20s - had cryo, all normal since    Reviewed and updated as needed this visit by clinical staff  Tobacco  Allergies  Meds  Med Hx  Surg Hx  Fam Hx  Soc Hx        Reviewed and updated as needed this visit by Provider            ROS:  CONSTITUTIONAL: NEGATIVE for fever, chills, change in weight  INTEGUMENTARY/SKIN: NEGATIVE for worrisome rashes, moles or lesions  EYES: NEGATIVE for vision changes or irritation  ENT: NEGATIVE for ear, mouth and throat problems  RESP: NEGATIVE for significant cough or SOB  BREAST: NEGATIVE for masses, tenderness or discharge  CV: NEGATIVE for chest pain, palpitations or peripheral edema  GI: NEGATIVE for nausea, abdominal pain, heartburn, or change in bowel habits  : NEGATIVE for unusual urinary or vaginal symptoms. No vaginal bleeding.  MUSCULOSKELETAL: NEGATIVE for significant arthralgias or myalgia  NEURO: NEGATIVE for weakness, dizziness or paresthesias  PSYCHIATRIC: NEGATIVE for changes in mood or affect     OBJECTIVE:   /72 (BP Location: Left arm, Patient Position: Chair, Cuff Size: Adult Regular)  Pulse 64  Temp 98.2  F (36.8  C) (Tympanic)  Ht 5' 1.5\" (1.562 m)  Wt 164 lb 6.4 oz (74.6 kg)  LMP 04/06/2016  BMI 30.56 kg/m2  EXAM:  GENERAL " APPEARANCE: healthy, alert and no distress  EYES: Eyes grossly normal to inspection, PERRL and conjunctivae and sclerae normal  HENT: ear canals and TM's normal, nose and mouth without ulcers or lesions, oropharynx clear and oral mucous membranes moist  NECK: no adenopathy, no asymmetry, masses, or scars and thyroid normal to palpation  RESP: lungs clear to auscultation - no rales, rhonchi or wheezes  BREAST: normal without masses, tenderness or nipple discharge and no palpable axillary masses or adenopathy  CV: regular rate and rhythm, normal S1 S2, no S3 or S4, no murmur, click or rub, no peripheral edema and peripheral pulses strong  ABDOMEN: soft, nontender, no hepatosplenomegaly, no masses and bowel sounds normal   (female): normal female external genitalia, normal urethral meatus, vaginal mucosal atrophy noted, normal cervix, adnexae, and uterus without masses or abnormal discharge  MS: no musculoskeletal defects are noted and gait is age appropriate without ataxia  SKIN: no suspicious lesions or rashes  NEURO: Normal strength and tone, sensory exam grossly normal, mentation intact and speech normal  PSYCH: mentation appears normal and affect normal/bright    ASSESSMENT/PLAN:       ICD-10-CM    1. Encounter for gynecological examination without abnormal finding Z01.419 Pap imaged thin layer screen with HPV - recommended age 30 - 65 years (select HPV order below)     HPV High Risk Types DNA Cervical     Lipid panel reflex to direct LDL Fasting   2. Moderate persistent asthma without complication J45.40 ipratropium - albuterol 0.5 mg/2.5 mg/3 mL (DUONEB) 0.5-2.5 (3) MG/3ML neb solution     loratadine (CLARITIN) 10 MG tablet     albuterol (PROAIR HFA/PROVENTIL HFA/VENTOLIN HFA) 108 (90 Base) MCG/ACT Inhaler     Beclomethasone Diprop HFA 80 MCG/ACT AERB     DISCONTINUED: Beclomethasone Diprop HFA 80 MCG/ACT AERB   3. Essential hypertension with goal blood pressure less than 140/90 I10 lisinopril  "(PRINIVIL/ZESTRIL) 10 MG tablet     **Basic metabolic panel FUTURE anytime   4. Gastroesophageal reflux disease without esophagitis K21.9 omeprazole (PRILOSEC) 40 MG capsule       COUNSELING:   Reviewed preventive health counseling, as reflected in patient instructions         reports that she quit smoking about 2 years ago. Her smoking use included Cigarettes. She has a 6.25 pack-year smoking history. She has never used smokeless tobacco.    Estimated body mass index is 30.56 kg/(m^2) as calculated from the following:    Height as of this encounter: 5' 1.5\" (1.562 m).    Weight as of this encounter: 164 lb 6.4 oz (74.6 kg).   Weight management plan: Discussed healthy diet and exercise guidelines and patient will follow up in 12 months in clinic to re-evaluate.      The risks, benefits and treatment options of prescribed medications or other treatments have been discussed with the patient. The patient verbalized their understanding and should call or follow up if no improvement or if they develop further problems.    REDD Tompkins [facility]  "

## 2018-06-05 NOTE — MR AVS SNAPSHOT
After Visit Summary   6/5/2018    Rose Mary Rose    MRN: [id number]           Patient Information     Date Of Birth          1965        Visit Information        Provider Department      6/5/2018 8:00 AM Soledad Bauman APRN [facility]        Today's Diagnoses     Encounter for gynecological examination without abnormal finding    -  1    Moderate persistent asthma without complication        Essential hypertension with goal blood pressure less than 140/90        Gastroesophageal reflux disease without esophagitis          Care Instructions      Preventive Health Recommendations  Female Ages 50 - 64    Yearly exam: See your health care provider every year in order to  o Review health changes.   o Discuss preventive care.    o Review your medicines if your doctor has prescribed any.      Get a Pap test every three years (unless you have an abnormal result and your provider advises testing more often).    If you get Pap tests with HPV test, you only need to test every 5 years, unless you have an abnormal result.     You do not need a Pap test if your uterus was removed (hysterectomy) and you have not had cancer.    You should be tested each year for STDs (sexually transmitted diseases) if you're at risk.     Have a mammogram every 1 to 2 years.    Have a colonoscopy at age 50, or have a yearly FIT test (stool test). These exams screen for colon cancer.      Have a cholesterol test every 5 years, or more often if advised.    Have a diabetes test (fasting glucose) every three years. If you are at risk for diabetes, you should have this test more often.     If you are at risk for osteoporosis (brittle bone disease), think about having a bone density scan (DEXA).    Shots: Get a flu shot each year. Get a tetanus shot every 10 years.    Nutrition:     Eat at least 5 servings of fruits and vegetables each day.    Eat whole-grain bread, whole-wheat pasta and brown rice  instead of white grains and rice.    Talk to your provider about Calcium and Vitamin D.     Lifestyle    Exercise at least 150 minutes a week (30 minutes a day, 5 days a week). This will help you control your weight and prevent disease.    Limit alcohol to one drink per day.    No smoking.     Wear sunscreen to prevent skin cancer.     See your dentist every six months for an exam and cleaning.    See your eye doctor every 1 to 2 years.        Thank you for choosing [facility].  You may be receiving a survey in the mail from Naveed Clark regarding your visit today.  Please take a few minutes to complete and return the survey to let us know how we are doing.      If you have questions or concerns, please contact us via LivQuik or you can contact your care team at [phone].    Our Clinic hours are:  Monday 6:40 am  to 7:00 pm  Tuesday -Friday 6:40 am to 5:00 pm    The [place] outpatient lab hours are:  Monday - Friday 6:10 am to 4:45 pm  Saturdays 7:00 am to 11:00 am  Appointments are required, call [phone]    If you have clinical questions after hours or would like to schedule an appointment,  call the clinic at [phone].            Follow-ups after your visit        Your next 10 appointments already scheduled     Jun 06, 2018  6:30 AM CDT   LAB with [facility] ([facility])    [address] [facility] [id number] [phone]           Please do not eat 10-12 hours before your appointment if you are coming in fasting for labs on lipids, cholesterol, or glucose (sugar). This does not apply to pregnant women. Water, hot tea and black coffee (with nothing added) are okay. Do not drink other fluids, diet soda or chew gum.              Future tests that were ordered for you today     Open Future Orders        Priority Expected Expires Ordered    Lipid panel reflex to direct LDL Fasting Routine 6/5/2018 6/5/2019 6/5/2018    **Basic metabolic panel FUTURE  "anytime Routine 6/5/2018 6/5/2019 6/5/2018            Who to contact     If you have questions or need follow up information about today's clinic visit or your schedule please contact [facility] directly at [phone].  Normal or non-critical lab and imaging results will be communicated to you by MyChart, letter or phone within 4 business days after the clinic has received the results. If you do not hear from us within 7 days, please contact the clinic through Indyarocks[employer] or phone. If you have a critical or abnormal lab result, we will notify you by phone as soon as possible.  Submit refill requests through [employer] or call your pharmacy and they will forward the refill request to us. Please allow 3 business days for your refill to be completed.          Additional Information About Your Visit        In[employer]ar[employer] Information     [employer] gives you secure access to your electronic health record. If you see a primary care provider, you can also send messages to your care team and make appointments. If you have questions, please call your primary care clinic.  If you do not have a primary care provider, please call [phone] and they will assist you.        Care EveryWhere ID     This is your Care EveryWhere ID. This could be used by other organizations to access your [place] medical records  [id number]        Your Vitals Were     Pulse Temperature Height Last Period BMI (Body Mass Index)       64 98.2  F (36.8  C) (Tympanic) 5' 1.5\" (1.562 m) 04/06/2016 30.56 kg/m2        Blood Pressure from Last 3 Encounters:   06/05/18 122/72   06/04/18 110/62   04/20/18 133/77    Weight from Last 3 Encounters:   06/05/18 164 lb 6.4 oz (74.6 kg)   06/04/18 166 lb (75.3 kg)   04/20/18 166 lb 3.2 oz (75.4 kg)              We Performed the Following     HPV High Risk Types DNA Cervical     Pap imaged thin layer screen with HPV - recommended age 30 - 65 years (select HPV order below)          Today's Medication Changes        "   These changes are accurate as of 6/5/18  8:49 AM.  If you have any questions, ask your nurse or doctor.               Start taking these medicines.        Dose/Directions    Beclomethasone Diprop HFA 80 MCG/ACT Aerb   Used for:  Moderate persistent asthma without complication   Started by:  Soledad Bauman APRN CNP        Dose:  2 puff   Inhale 2 puffs into the lungs 2 times daily   Quantity:  3 Inhaler   Refills:  3         These medicines have changed or have updated prescriptions.        Dose/Directions    * albuterol (2.5 MG/3ML) 0.083% neb solution   This may have changed:  Another medication with the same name was changed. Make sure you understand how and when to take each.   Used for:  Moderate persistent asthma without complication   Changed by:  Soledad Bauman APRN CNP        Dose:  1 vial   Take 1 vial (2.5 mg) by nebulization every 4 hours as needed for shortness of breath / dyspnea   Quantity:  3 Box   Refills:  3       * albuterol 108 (90 Base) MCG/ACT Inhaler   Commonly known as:  PROAIR HFA/PROVENTIL HFA/VENTOLIN HFA   This may have changed:    - how much to take  - how to take this  - when to take this  - reasons to take this  - additional instructions   Used for:  Moderate persistent asthma without complication   Changed by:  Soledad Bauman APRN CNP        Dose:  2 puff   Inhale 2 puffs into the lungs every 6 hours as needed for shortness of breath / dyspnea or wheezing   Quantity:  3 Inhaler   Refills:  11       loratadine 10 MG tablet   Commonly known as:  CLARITIN   This may have changed:  See the new instructions.   Used for:  Moderate persistent asthma without complication   Changed by:  Soledad Bauman APRN CNP        Dose:  1 tablet   Take 1 tablet (10 mg) by mouth daily   Quantity:  90 tablet   Refills:  3       * Notice:  This list has 2 medication(s) that are the same as other medications prescribed for you. Read the directions  carefully, and ask your doctor or other care provider to review them with you.      Stop taking these medicines if you haven't already. Please contact your care team if you have questions.     beclomethasone 80 MCG/ACT Inhaler   Commonly known as:  QVAR   Stopped by:  Soledad Bauman APRN CNP                Where to get your medicines      These medications were [facility] to [facility] - [address]  [address] [address]     Phone:  [phone]     albuterol 108 (90 Base) MCG/ACT Inhaler    Beclomethasone Diprop HFA 80 MCG/ACT Aerb    ipratropium - albuterol 0.5 mg/2.5 mg/3 mL 0.5-2.5 (3) MG/3ML neb solution    lisinopril 10 MG tablet    loratadine 10 MG tablet    omeprazole 40 MG capsule                Primary Care Provider Office Phone # Fax #    REDD Martinez [phone] [address] [facility] [address]        Equal Access to Services     [facility] : Hadii aad ku hadasho Soomaali, waaxda luqadaha, qaybta kaalmada ademaxyaaquilino, patrick allen . So [facility] [phone].    ATENCIÓN: Si habla español, tiene a jones disposición servicios gratuitos de asistencia lingüística. Gisselle al [phone].    We comply with applicable federal civil rights laws and [place] laws. We do not discriminate on the basis of race, color, national origin, age, disability, sex, sexual orientation, or gender identity.            Thank you!     Thank you for choosing [facility]  for your care. Our goal is always to provide you with excellent care. Hearing back from our patients is one way we can continue to improve our services. Please take a few minutes to complete the written survey that you may receive in the mail after your visit with us. Thank you!             Your Updated Medication List - Protect others around you: Learn how to safely use, store and throw away your medicines at  www.disposemymeds.org.          This list is accurate as of 6/5/18  8:49 AM.  Always use your most recent med list.                   Brand Name Dispense Instructions for use Diagnosis    * albuterol (2.5 MG/3ML) 0.083% neb solution     3 Box    Take 1 vial (2.5 mg) by nebulization every 4 hours as needed for shortness of breath / dyspnea    Moderate persistent asthma without complication       * albuterol 108 (90 Base) MCG/ACT Inhaler    PROAIR HFA/PROVENTIL HFA/VENTOLIN HFA    3 Inhaler    Inhale 2 puffs into the lungs every 6 hours as needed for shortness of breath / dyspnea or wheezing    Moderate persistent asthma without complication       Beclomethasone Diprop HFA 80 MCG/ACT Aerb     3 Inhaler    Inhale 2 puffs into the lungs 2 times daily    Moderate persistent asthma without complication       clobetasol 0.05 % cream    TEMOVATE    60 g    Apply sparingly to affected area twice daily as needed.  Do not apply to face.    Dyshidrotic eczema       ipratropium - albuterol 0.5 mg/2.5 mg/3 mL 0.5-2.5 (3) MG/3ML neb solution    DUONEB    1 Box    Take 1 vial (3 mLs) by nebulization every 6 hours as needed for shortness of breath / dyspnea or wheezing    Moderate persistent asthma without complication       levothyroxine 25 MCG tablet    SYNTHROID/LEVOTHROID    90 tablet    Take 1 tablet (25 mcg) by mouth daily    Acquired hypothyroidism       lisinopril 10 MG tablet    PRINIVIL/ZESTRIL    90 tablet    Take 1 tablet (10 mg) by mouth daily    Essential hypertension with goal blood pressure less than 140/90       loratadine 10 MG tablet    CLARITIN    90 tablet    Take 1 tablet (10 mg) by mouth daily    Moderate persistent asthma without complication       omeprazole 40 MG capsule    priLOSEC    30 capsule    Take 1 capsule (40 mg) by mouth daily Take 30-60 minutes before a meal.    Gastroesophageal reflux disease without esophagitis       * Notice:  This list has 2 medication(s) that are the same as other  medications prescribed for you. Read the directions carefully, and ask your doctor or other care provider to review them with you.

## 2018-06-05 NOTE — PATIENT INSTRUCTIONS
Preventive Health Recommendations  Female Ages 50 - 64    Yearly exam: See your health care provider every year in order to  o Review health changes.   o Discuss preventive care.    o Review your medicines if your doctor has prescribed any.      Get a Pap test every three years (unless you have an abnormal result and your provider advises testing more often).    If you get Pap tests with HPV test, you only need to test every 5 years, unless you have an abnormal result.     You do not need a Pap test if your uterus was removed (hysterectomy) and you have not had cancer.    You should be tested each year for STDs (sexually transmitted diseases) if you're at risk.     Have a mammogram every 1 to 2 years.    Have a colonoscopy at age 50, or have a yearly FIT test (stool test). These exams screen for colon cancer.      Have a cholesterol test every 5 years, or more often if advised.    Have a diabetes test (fasting glucose) every three years. If you are at risk for diabetes, you should have this test more often.     If you are at risk for osteoporosis (brittle bone disease), think about having a bone density scan (DEXA).    Shots: Get a flu shot each year. Get a tetanus shot every 10 years.    Nutrition:     Eat at least 5 servings of fruits and vegetables each day.    Eat whole-grain bread, whole-wheat pasta and brown rice instead of white grains and rice.    Talk to your provider about Calcium and Vitamin D.     Lifestyle    Exercise at least 150 minutes a week (30 minutes a day, 5 days a week). This will help you control your weight and prevent disease.    Limit alcohol to one drink per day.    No smoking.     Wear sunscreen to prevent skin cancer.     See your dentist every six months for an exam and cleaning.    See your eye doctor every 1 to 2 years.        Thank you for choosing [facility].  You may be receiving a survey in the mail from [employer] regarding your visit today.  Please take a few minutes to  complete and return the survey to let us know how we are doing.      If you have questions or concerns, please contact us via Rewalon or you can contact your care team at [phone].    Our Clinic hours are:  Monday 6:40 am  to 7:00 pm  Tuesday -Friday 6:40 am to 5:00 pm    The [place] outpatient lab hours are:  Monday - Friday 6:10 am to 4:45 pm  Saturdays 7:00 am to 11:00 am  Appointments are required, call [phone]    If you have clinical questions after hours or would like to schedule an appointment,  call the clinic at [phone].

## 2018-06-06 DIAGNOSIS — Z01.419 ENCOUNTER FOR GYNECOLOGICAL EXAMINATION WITHOUT ABNORMAL FINDING: ICD-10-CM

## 2018-06-06 DIAGNOSIS — I10 ESSENTIAL HYPERTENSION WITH GOAL BLOOD PRESSURE LESS THAN 140/90: ICD-10-CM

## 2018-06-06 LAB
ANION GAP SERPL CALCULATED.3IONS-SCNC: 5 MMOL/L (ref 3–14)
BUN SERPL-MCNC: 11 MG/DL (ref 7–30)
CALCIUM SERPL-MCNC: 9.4 MG/DL (ref 8.5–10.1)
CHLORIDE SERPL-SCNC: 105 MMOL/L (ref 94–109)
CHOLEST SERPL-MCNC: 179 MG/DL
CO2 SERPL-SCNC: 29 MMOL/L (ref 20–32)
CREAT SERPL-MCNC: 0.74 MG/DL (ref 0.52–1.04)
GFR SERPL CREATININE-BSD FRML MDRD: 81 ML/MIN/1.7M2
GLUCOSE SERPL-MCNC: 93 MG/DL (ref 70–99)
HDLC SERPL-MCNC: 55 MG/DL
LDLC SERPL CALC-MCNC: 95 MG/DL
NONHDLC SERPL-MCNC: 124 MG/DL
POTASSIUM SERPL-SCNC: 4 MMOL/L (ref 3.4–5.3)
SODIUM SERPL-SCNC: 139 MMOL/L (ref 133–144)
TRIGL SERPL-MCNC: 145 MG/DL

## 2018-06-06 PROCEDURE — 36415 COLL VENOUS BLD VENIPUNCTURE: CPT | Performed by: NURSE PRACTITIONER

## 2018-06-06 PROCEDURE — 80048 BASIC METABOLIC PNL TOTAL CA: CPT | Performed by: NURSE PRACTITIONER

## 2018-06-06 PROCEDURE — 80061 LIPID PANEL: CPT | Performed by: NURSE PRACTITIONER

## 2018-06-07 LAB
COPATH REPORT: NORMAL
PAP: NORMAL

## 2018-06-11 LAB
FINAL DIAGNOSIS: NORMAL
HPV HR 12 DNA CVX QL NAA+PROBE: NEGATIVE
HPV16 DNA SPEC QL NAA+PROBE: NEGATIVE
HPV18 DNA SPEC QL NAA+PROBE: NEGATIVE
SPECIMEN DESCRIPTION: NORMAL
SPECIMEN SOURCE CVX/VAG CYTO: NORMAL

## 2018-07-28 DIAGNOSIS — J45.40 MODERATE PERSISTENT ASTHMA WITHOUT COMPLICATION: ICD-10-CM

## 2018-07-30 NOTE — TELEPHONE ENCOUNTER
"Requested Prescriptions   Pending Prescriptions Disp Refills     QVAR REDIHALER 80 MCG/ACT AERB inhaler [Pharmacy Med Name: QVAR REDIHALER 80 MCG]  0     Sig: TAKE 2 PUFFS BY MOUTH TWICE A DAY    Inhaled Steroids Protocol Passed    7/28/2018 11:49 AM       Passed - Patient is age 12 or older       Passed - Asthma control assessment score within normal limits in last 6 months    Please review ACT score.     ACT Total Scores 3/13/2017 9/18/2017 4/20/2018   ACT TOTAL SCORE - - -   ASTHMA ER VISITS - - -   ASTHMA HOSPITALIZATIONS - - -   ACT TOTAL SCORE (Goal Greater than or Equal to 20) 21 21 23   In the past 12 months, how many times did you visit the emergency room for your asthma without being admitted to the hospital? 0 0 0   In the past 12 months, how many times were you hospitalized overnight because of your asthma? 0 0 0              Passed - Recent (6 mo) or future (30 days) visit within the authorizing provider's specialty    Patient had office visit in the last 6 months or has a visit in the next 30 days with authorizing provider or within the authorizing provider's specialty.  See \"Patient Info\" tab in inbasket, or \"Choose Columns\" in Meds & Orders section of the refill encounter.      Last Written Prescription Date:  6/5/18  Last Fill Quantity: 3,  # refills: 3   Last office visit: 6/5/2018 with prescribing provider:     Future Office Visit:                "

## 2018-07-31 RX ORDER — BECLOMETHASONE DIPROPIONATE HFA 80 UG/1
AEROSOL, METERED RESPIRATORY (INHALATION)
Refills: 0 | OUTPATIENT
Start: 2018-07-31

## 2018-08-07 DIAGNOSIS — J45.40 MODERATE PERSISTENT ASTHMA WITHOUT COMPLICATION: ICD-10-CM

## 2018-08-07 NOTE — TELEPHONE ENCOUNTER
"Requested Prescriptions   Pending Prescriptions Disp Refills     beclomethasone HFA (QVAR REDIHALER) 80 MCG/ACT AERB inhaler 3 Inhaler 3    Last Written Prescription Date:  6/5/18  Last Fill Quantity: 3,  # refills: 3   Last office visit: 6/5/2018 with prescribing provider:  6/5/18 brunfelt   Future Office Visit:     Sig: Inhale 2 puffs into the lungs 2 times daily    Inhaled Steroids Protocol Passed    8/7/2018 10:39 AM       Passed - Patient is age 12 or older       Passed - Asthma control assessment score within normal limits in last 6 months    Please review ACT score.          Passed - Recent (6 mo) or future (30 days) visit within the authorizing provider's specialty    Patient had office visit in the last 6 months or has a visit in the next 30 days with authorizing provider or within the authorizing provider's specialty.  See \"Patient Info\" tab in inbasket, or \"Choose Columns\" in Meds & Orders section of the refill encounter.              "

## 2018-08-17 ENCOUNTER — OFFICE VISIT (OUTPATIENT)
Dept: FAMILY MEDICINE | Facility: CLINIC | Age: 53
End: 2018-08-17
Payer: COMMERCIAL

## 2018-08-17 VITALS
BODY MASS INDEX: 30.2 KG/M2 | SYSTOLIC BLOOD PRESSURE: 138 MMHG | OXYGEN SATURATION: 96 % | WEIGHT: 164.1 LBS | HEART RATE: 67 BPM | TEMPERATURE: 97.6 F | HEIGHT: 62 IN | DIASTOLIC BLOOD PRESSURE: 84 MMHG

## 2018-08-17 DIAGNOSIS — J45.41 MODERATE PERSISTENT ASTHMA WITH EXACERBATION: Primary | ICD-10-CM

## 2018-08-17 PROCEDURE — 99213 OFFICE O/P EST LOW 20 MIN: CPT | Performed by: NURSE PRACTITIONER

## 2018-08-17 RX ORDER — ALBUTEROL SULFATE 0.83 MG/ML
2.5 SOLUTION RESPIRATORY (INHALATION) EVERY 4 HOURS PRN
Qty: 3 BOX | Refills: 3 | Status: SHIPPED | OUTPATIENT
Start: 2018-08-17 | End: 2019-06-03

## 2018-08-17 RX ORDER — PREDNISONE 20 MG/1
TABLET ORAL
Qty: 15 TABLET | Refills: 0 | Status: SHIPPED | OUTPATIENT
Start: 2018-08-17 | End: 2018-09-07

## 2018-08-17 NOTE — PROGRESS NOTES
"  SUBJECTIVE:   Rose Mary Rose is a 53 year old female who presents to clinic today for the following health issues: asthma uncontrolled, this summer had  2 exacerbations, she is going camping tomorrow and asking for Prednisone prescription. Complains of wheezing, dry cough and runny nose, symptoms started 2 days ago. States QVAR inhaler is not helping, plus she does not like the taste of medication in her mouth after each use.     Asthma Follow-Up    Was ACT completed today?  Yes     Respiratory symptoms:   Cough: Yes-  More at night    Wheezing: Yes-     Shortness of breath: Yes-     Taking controlled (daily) meds as prescribed: Yes    ER/UC visits or hospital admissions since last visit: none     Recent asthma triggers that patient is dealing with: humidity, smoke        Amount of exercise or physical activity: None    Problems taking medications regularly: No    Medication side effects: none    Diet: regular (no restrictions)    Problem list and histories reviewed & adjusted, as indicated.  Additional history: as documented    Labs reviewed in EPIC    Reviewed and updated as needed this visit by clinical staff  Tobacco  Allergies  Med Hx  Surg Hx  Fam Hx  Soc Hx      Reviewed and updated as needed this visit by Provider         ROS:  Constitutional, HEENT, cardiovascular, pulmonary, gi and gu systems are negative, except as otherwise noted.    OBJECTIVE:     /84  Pulse 67  Temp 97.6  F (36.4  C) (Tympanic)  Ht 5' 1.5\" (1.562 m)  Wt 164 lb 1.6 oz (74.4 kg)  LMP 04/06/2016  SpO2 96%  BMI 30.5 kg/m2  Body mass index is 30.5 kg/(m^2).  GENERAL: healthy, alert and no distress  EYES: Eyes grossly normal to inspection, PERRL and conjunctivae and sclerae normal  HENT: normal cephalic/atraumatic, oropharynx clear, oral mucous membranes moist and sinuses: not tender  NECK: no adenopathy, no asymmetry, masses, or scars and thyroid normal to palpation  RESP: mild expiratory wheezes bilaterally   CV: " regular rate and rhythm, normal S1 S2, no S3 or S4, no murmur, click or rub, no peripheral edema and peripheral pulses strong  PSYCH: mentation appears normal, affect normal/bright    Diagnostic Test Results:  none     ASSESSMENT/PLAN:     1. Moderate persistent asthma with exacerbation  -I recommended patient to change Qvar to different corticosteroid inhaler, she declined stating that she would like to talk to her PCP first. She also wants to call insurance to get some info what will be covered. She used to be on Flovent before and states it helped to control asthma, but unfortunately she could not afford it anymore. I gave her other options including Pulmicort, Aerospan, Breo-Ellipta, Dulera, Symbicort, Advair, but the last 3 might be expensive too.   - predniSONE (DELTASONE) 20 MG tablet; 40 mg daily for 5 days then 20 mg for 5 more days  Dispense: 15 tablet; Refill: 0  - albuterol (2.5 MG/3ML) 0.083% neb solution; Take 1 vial (2.5 mg) by nebulization every 4 hours as needed for shortness of breath / dyspnea  Dispense: 3 Box; Refill: 3  -continue Qvar for now and follow up with PCP     See Patient Instructions    REDD Chowdhury [facility]

## 2018-08-17 NOTE — MR AVS SNAPSHOT
After Visit Summary   8/17/2018    Rose Mary Rose    MRN: [id number]           Patient Information     Date Of Birth          1965        Visit Information        Provider Department      8/17/2018 3:00 PM So Caal APRN CNP [facility]        Today's Diagnoses     Mild intermittent asthma with exacerbation    -  1      Care Instructions    Alternative controlled  Inhalers: breo-elipta, Dulera, Symbicort, Advair. You can check with your insurance to see what will be covered.    Follow up with PCP to discuss alternative controlled inhaler    Start Prednisone 40 mg daily for 5 days, then 20 mg daily for 5 more days  Flonase spray    Can use Afrin spray twice daily as needed for nasal congestion for up to 3 days                             Follow-ups after your visit        Your next 10 appointments already scheduled     Aug 17, 2018  3:00 PM CDT   SHORT with REDD Warner CNP   [facility] ([facility])    [address] [place] [address]   [phone]            Sep 07, 2018  3:00 PM CDT   [facility] Sports Medicine New with Salma Moreno MD   [facility] ([facility])    [address]  [address]  [facility] [address]   [phone]              Who to contact     If you have questions or need follow up information about today's clinic visit or your schedule please contact [facility] directly at [phone].  Normal or non-critical lab and imaging results will be communicated to you by MyChart, letter or phone within 4 business days after the clinic has received the results. If you do not hear from us within 7 days, please contact the clinic through [employer] or phone. If you have a critical or abnormal lab result, we will notify you by phone as soon as possible.  Submit refill requests through [employer] or call your pharmacy and they will forward the  "refill request to us. Please allow 3 business days for your refill to be completed.          Additional Information About Your Visit        [employer] Information     [employer] gives you secure access to your electronic health record. If you see a primary care provider, you can also send messages to your care team and make appointments. If you have questions, please call your primary care clinic.  If you do not have a primary care provider, please call [phone] and they will assist you.        Care EveryWhere ID     This is your Care EveryWhere ID. This could be used by other organizations to access your [facility] medical records  [id number]        Your Vitals Were     Pulse Temperature Height Last Period Pulse Oximetry BMI (Body Mass Index)    67 97.6  F (36.4  C) (Tympanic) 5' 1.5\" (1.562 m) 04/06/2016 96% 30.5 kg/m2       Blood Pressure from Last 3 Encounters:   08/17/18 138/84   06/05/18 122/72   06/04/18 110/62    Weight from Last 3 Encounters:   08/17/18 164 lb 1.6 oz (74.4 kg)   06/05/18 164 lb 6.4 oz (74.6 kg)   06/04/18 166 lb (75.3 kg)              Today, you had the following     No orders found for display         Today's Medication Changes          These changes are accurate as of 8/17/18  2:40 PM.  If you have any questions, ask your nurse or doctor.               Start taking these medicines.        Dose/Directions    predniSONE 20 MG tablet   Commonly known as:  DELTASONE   Used for:  Mild intermittent asthma with exacerbation   Started by:  So Caal APRN CNP        40 mg daily for 5 days then 20 mg for 5 more days   Quantity:  15 tablet   Refills:  0            Where to get your medicines      These medications were sent to [facility] [place], [place] - [address]  [address] [address]     Phone:  [phone]     predniSONE 20 MG tablet                Primary Care Provider Office Phone # Fax #    Soledad Gonzalez REDD Garcia [phone] " [phone]       [address] [address]        Equal Access to Services     EDDA IQBAL : Hadii leelee herring hadrogermarilynn Sotejalali, waaxda luqadaha, qaybta kaalmada gold, patrick abelardo taylorayush marksmax ordoñez alejandro la. So [place] -[phone].    ATENCIÓN: Si habla español, tiene a jones disposición servicios gratuitos de asistencia lingüística. Llame al [phone].    We comply with applicable federal civil rights laws and [place] laws. We do not discriminate on the basis of race, color, national origin, age, disability, sex, sexual orientation, or gender identity.            Thank you!     Thank you for choosing [facility]  for your care. Our goal is always to provide you with excellent care. Hearing back from our patients is one way we can continue to improve our services. Please take a few minutes to complete the written survey that you may receive in the mail after your visit with us. Thank you!             Your Updated Medication List - Protect others around you: Learn how to safely use, store and throw away your medicines at www.disposemymeds.org.          This list is accurate as of 8/17/18  2:40 PM.  Always use your most recent med list.                   Brand Name Dispense Instructions for use Diagnosis    * albuterol (2.5 MG/3ML) 0.083% neb solution     3 Box    Take 1 vial (2.5 mg) by nebulization every 4 hours as needed for shortness of breath / dyspnea    Moderate persistent asthma without complication       * albuterol 108 (90 Base) MCG/ACT inhaler    PROAIR HFA/PROVENTIL HFA/VENTOLIN HFA    3 Inhaler    Inhale 2 puffs into the lungs every 6 hours as needed for shortness of breath / dyspnea or wheezing    Moderate persistent asthma without complication       beclomethasone HFA 80 MCG/ACT inhaler    QVAR REDIHALER    3 Inhaler    Inhale 2 puffs into the lungs 2 times daily    Moderate persistent asthma without complication       clobetasol 0.05 % cream    TEMOVATE    60 g    Apply  sparingly to affected area twice daily as needed.  Do not apply to face.    Dyshidrotic eczema       ipratropium - albuterol 0.5 mg/2.5 mg/3 mL 0.5-2.5 (3) MG/3ML neb solution    DUONEB    1 Box    Take 1 vial (3 mLs) by nebulization every 6 hours as needed for shortness of breath / dyspnea or wheezing    Moderate persistent asthma without complication       levothyroxine 25 MCG tablet    SYNTHROID/LEVOTHROID    90 tablet    Take 1 tablet (25 mcg) by mouth daily    Acquired hypothyroidism       lisinopril 10 MG tablet    PRINIVIL/ZESTRIL    90 tablet    Take 1 tablet (10 mg) by mouth daily    Essential hypertension with goal blood pressure less than 140/90       loratadine 10 MG tablet    CLARITIN    90 tablet    Take 1 tablet (10 mg) by mouth daily    Moderate persistent asthma without complication       omeprazole 40 MG capsule    priLOSEC    30 capsule    Take 1 capsule (40 mg) by mouth daily Take 30-60 minutes before a meal.    Gastroesophageal reflux disease without esophagitis       predniSONE 20 MG tablet    DELTASONE    15 tablet    40 mg daily for 5 days then 20 mg for 5 more days    Mild intermittent asthma with exacerbation       * Notice:  This list has 2 medication(s) that are the same as other medications prescribed for you. Read the directions carefully, and ask your doctor or other care provider to review them with you.

## 2018-08-17 NOTE — PATIENT INSTRUCTIONS
Alternative controlled  Inhalers: breo-elipta, Pulmicort, Aerospan, Dulera, Symbicort, Advair. You can check with your insurance to see what will be covered.    Follow up with PCP to discuss alternative controlled inhaler, the option either changed Qvar to different inhaled corticosteroid, or increase Qvar dose.     Start Prednisone 40 mg daily for 5 days, then 20 mg daily for 5 more days  Flonase spray    Can use Afrin spray twice daily as needed for nasal congestion for up to 3 days

## 2018-08-18 ASSESSMENT — ASTHMA QUESTIONNAIRES: ACT_TOTALSCORE: 9

## 2018-09-07 ENCOUNTER — RADIANT APPOINTMENT (OUTPATIENT)
Dept: GENERAL RADIOLOGY | Facility: CLINIC | Age: 53
End: 2018-09-07
Attending: PEDIATRICS
Payer: COMMERCIAL

## 2018-09-07 ENCOUNTER — OFFICE VISIT (OUTPATIENT)
Dept: ORTHOPEDICS | Facility: CLINIC | Age: 53
End: 2018-09-07
Payer: COMMERCIAL

## 2018-09-07 VITALS
SYSTOLIC BLOOD PRESSURE: 131 MMHG | DIASTOLIC BLOOD PRESSURE: 69 MMHG | WEIGHT: 164 LBS | HEIGHT: 62 IN | BODY MASS INDEX: 30.18 KG/M2

## 2018-09-07 DIAGNOSIS — M17.11 ARTHRITIS OF RIGHT KNEE: Primary | ICD-10-CM

## 2018-09-07 DIAGNOSIS — M25.561 RIGHT KNEE PAIN: ICD-10-CM

## 2018-09-07 PROCEDURE — 99203 OFFICE O/P NEW LOW 30 MIN: CPT | Performed by: PEDIATRICS

## 2018-09-07 PROCEDURE — 73562 X-RAY EXAM OF KNEE 3: CPT

## 2018-09-07 NOTE — PROGRESS NOTES
Sports Medicine Clinic Visit    PCP: Soledad Bauman    Rose Mary Rose is a 53 year old female who is seen as a self referral presenting with right knee pain    Injury: She reports chronic knee pain of 2-3 years. She denies any injury at this time. She reports pain in the past has been intermittent, but over the last 2 months pain has become consistent. She reports pain and difficulty with walking, sometimes will come and go.    Location of Pain: right medial knee  Duration of Pain: 2-3 years   Rating of Pain at worst: 7/10  Rating of Pain Currently: 7/10  Symptoms are better with: Tylenol and Rest  Symptoms are worse with: walking  Additional Features:   Positive: instability and numbness   Negative: swelling, bruising, popping, grinding, catching, locking, paresthesias and weakness  Other evaluation and/or treatments so far consists of: Tylenol and Rest  Prior History of related problems: nothing    Social History: desk work and inventory    Review of Systems  Skin: no bruising, mild swelling  Musculoskeletal: as above  Neurologic: no numbness, paresthesias  Remainder of review of systems is negative including constitutional, CV, pulmonary, GI, except as noted in HPI or medical history.    Patient's current problem list, past medical and surgical history, and family history were reviewed.    Patient Active Problem List   Diagnosis     Obesity     Enlarged thyroid with 2mm cyst      Family history of colon cancer     CARDIOVASCULAR SCREENING; LDL GOAL LESS THAN 130     Family history of diabetes mellitus     GERD (gastroesophageal reflux disease)     Moderate persistent asthma     Prediabetes     NAFLD (nonalcoholic fatty liver disease)     HTN (hypertension)     Acquired hypothyroidism     Advanced directives, counseling/discussion     Past Medical History:   Diagnosis Date     Abnormal Papanicolaou smear of cervix and cervical HPV 2003    Cryo  (path results?)     Advanced directives,  "counseling/discussion 4/20/2018    Advance Care Planning 4/20/2018: ACP Review of Chart / Resources Provided:  Reviewed chart for advance care plan.  Rose Mary Rose has been provided information and resources to begin or update their advance care plan.  Added by Rachelle Buchanan        Eczema      Human papillomavirus in conditions classified elsewhere and of unspecified site     Genital warts     Past Surgical History:   Procedure Laterality Date     COLONOSCOPY  12/3/2012    Procedure: COLONOSCOPY;  Colonoscopy;  Surgeon: Darnell Siddiqui MD;  Location: [facility]     COLONOSCOPY N/A 6/4/2018    Procedure: COLONOSCOPY;  colonoscopy;  Surgeon: Nakul Antonio MD;  Location: [facility]     ESOPHAGOSCOPY, GASTROSCOPY, DUODENOSCOPY (EGD), COMBINED N/A 5/5/2017    Procedure: COMBINED ESOPHAGOSCOPY, GASTROSCOPY, DUODENOSCOPY (EGD), BIOPSY SINGLE OR MULTIPLE;  Gastroscopy;  Surgeon: Yonny Zambrano MD;  Location: [facility]     NO HISTORY OF SURGERY  8/2007     Family History   Problem Relation Age of Onset     Hypertension Mother      Diabetes Mother      C.A.D. Maternal Grandmother      Hypertension Maternal Grandmother      Cerebrovascular Disease Maternal Grandmother      Cancer Paternal Grandfather      ? type     Diabetes Paternal Grandfather      Adult onset     Cancer Maternal Grandfather      ? renal     Diabetes Father      Hypertension Father      Cancer - colorectal Father      Coronary Artery Disease Father      Respiratory Daughter      asthma     HEART DISEASE Brother      double bypass     C.A.D. Brother      Cancer Brother      testicular ca     Hypertension Brother      Myocardial Infarction Brother      Breast Cancer No family hx of          Objective  /69 (BP Location: Left arm, Patient Position: Chair, Cuff Size: Adult Regular)  Ht 5' 1.5\" (1.562 m)  Wt 164 lb (74.4 kg)  LMP 04/06/2016  BMI 30.49 kg/m2    GENERAL APPEARANCE: healthy, alert and no distress   GAIT: NORMAL  SKIN: no suspicious lesions " or rashes  HEENT: Sclera clear, anicteric  CV: good peripheral pulses  RESP: Breathing not labored  NEURO: Normal strength and tone, mentation intact and speech normal  PSYCH:  mentation appears normal and affect normal/bright    Bilateral Knee exam    Inspection:      mild effusion right    Patella:      Mobility -       normal right       Crepitus noted in the patellofemoral joint bilateral    Tender:      medial patellar border right       medial joint line right    Non Tender:      remainder of knee area bilateral    Knee ROM:      Full active and passive ROM with flexion and extension bilateral    Hip ROM:     Full active and passive ROM bilateral    Strength:      5/5 with knee extension bilateral    Special Tests:     neg (-) Shayla right       neg (-) anterior drawer right       neg (-) posterior drawer right       neg (-) varus at 0 deg and 30 deg right       neg (-) valgus at 0 deg and 30 deg right    Gait:      normal    Neurovascular:      2+ peripheral pulses bilaterally and brisk capillary refill       sensation grossly intact    Radiology  I ordered, visualized and reviewed these images with the patient  Xr Knee Standing Ap Bilat [place] Bilat Lat Right    Result Date: 9/8/2018  XR KNEE STANDING AP BILAT [place] BILAT LAT RT, 9/7/2018 3:28 PM Comparison: None History: RIGHT knee pain. Findings: RIGHT knee: Tricompartmental osteophytosis with moderate medial compartmental joint space loss. No fractures are seen. No knee effusion. LEFT knee: Evaluation is limited to the anterior and patellar sunrise projections. Osteophytosis is noted with mild medial compartmental joint space loss. No fractures are seen.     Impression: Medial compartmental predominant knee osteoarthritis, RIGHT worse than LEFT. No acute bone abnormalities identified. MATTHIEU FELIPE MD    Assessment:  1. Arthritis of right knee      Discussed nature of degenerative arthrosis of the knee. Discussed symptom treatment with  over-the-counter medications, ice or heat, topical treatments, and rest if needed. Discussed use of sleeve or wrap for comfort. Discussed benefits of exercise and weight loss to reduce pressure at the knee. Discussed injection therapy. Also briefly discussed future consideration of referral to orthopedic surgery for further evaluation and discussion of arthroplasty.    Plan:  - Today's Plan of Care:  Continue with Rest, Ice, Compression, and Elevation.  Apply ice 10-15 minutes every 2-3 hours  Over the counter medication: Patient's preferred OTC medication as directed on packaging.  [phone]    -We also discussed other future treatment options:  Referral to orthopedic surgeon or Steroid injection of knee    Follow Up: as needed    Concerning signs and symptoms were reviewed.  The patient expressed understanding of this management plan and all questions were answered at this time.    Salma Moreno MD CAQ  Primary Care Sports Medicine  [facility]

## 2018-09-07 NOTE — PATIENT INSTRUCTIONS
Plan:  - Today's Plan of Care:  Continue with Rest, Ice, Compression, and Elevation.  Apply ice 10-15 minutes every 2-3 hours  Over the counter medication: Patient's preferred OTC medication as directed on packaging.  [facility] [phone]    -We also discussed other future treatment options:  Referral to orthopedic surgeon or Steroid injection of knee    Follow Up: as needed    If you have any further questions for your physician or physician s care team you can call [phone] and use option 3 to leave a voice message. Calls received during business hours will be returned same day.

## 2018-09-07 NOTE — LETTER
9/7/2018         RE: Rose Mary Rose  [address] [facility] [address]        Dear Colleague,    Thank you for referring your patient, Rose Mary Rose, to the [facility]. Please see a copy of my visit note below.    Sports Medicine Clinic Visit    PCP: Soledad Bauman    Rose Mary Rose is a 53 year old female who is seen as a self referral presenting with right knee pain    Injury: She reports chronic knee pain of 2-3 years. She denies any injury at this time. She reports pain in the past has been intermittent, but over the last 2 months pain has become consistent. She reports pain and difficulty with walking, sometimes will come and go.    Location of Pain: right medial knee  Duration of Pain: 2-3 years   Rating of Pain at worst: 7/10  Rating of Pain Currently: 7/10  Symptoms are better with: Tylenol and Rest  Symptoms are worse with: walking  Additional Features:   Positive: instability and numbness   Negative: swelling, bruising, popping, grinding, catching, locking, paresthesias and weakness  Other evaluation and/or treatments so far consists of: Tylenol and Rest  Prior History of related problems: nothing    Social History: desk work and inventory    Review of Systems  Skin: no bruising, mild swelling  Musculoskeletal: as above  Neurologic: no numbness, paresthesias  Remainder of review of systems is negative including constitutional, CV, pulmonary, GI, except as noted in HPI or medical history.    Patient's current problem list, past medical and surgical history, and family history were reviewed.    Patient Active Problem List   Diagnosis     Obesity     Enlarged thyroid with 2mm cyst      Family history of colon cancer     CARDIOVASCULAR SCREENING; LDL GOAL LESS THAN 130     Family history of diabetes mellitus     GERD (gastroesophageal reflux disease)     Moderate persistent asthma     Prediabetes     NAFLD (nonalcoholic fatty liver disease)      HTN (hypertension)     Acquired hypothyroidism     Advanced directives, counseling/discussion     Past Medical History:   Diagnosis Date     Abnormal Papanicolaou smear of cervix and cervical HPV 2003    Cryo  (path results?)     Advanced directives, counseling/discussion 4/20/2018    Advance Care Planning 4/20/2018: ACP Review of Chart / Resources Provided:  Reviewed chart for advance care plan.  Rose Mary Rose has been provided information and resources to begin or update their advance care plan.  Added by Rachelle Buchanan        Eczema      Human papillomavirus in conditions classified elsewhere and of unspecified site     Genital warts     Past Surgical History:   Procedure Laterality Date     COLONOSCOPY  12/3/2012    Procedure: COLONOSCOPY;  Colonoscopy;  Surgeon: Darnell Siddiqui MD;  Location: [facility]     COLONOSCOPY N/A 6/4/2018    Procedure: COLONOSCOPY;  colonoscopy;  Surgeon: Nakul Antonio MD;  Location: [facility]     ESOPHAGOSCOPY, GASTROSCOPY, DUODENOSCOPY (EGD), COMBINED N/A 5/5/2017    Procedure: COMBINED ESOPHAGOSCOPY, GASTROSCOPY, DUODENOSCOPY (EGD), BIOPSY SINGLE OR MULTIPLE;  Gastroscopy;  Surgeon: Yonny Zambrano MD;  Location: [facility]     NO HISTORY OF SURGERY  8/2007     Family History   Problem Relation Age of Onset     Hypertension Mother      Diabetes Mother      C.A.D. Maternal Grandmother      Hypertension Maternal Grandmother      Cerebrovascular Disease Maternal Grandmother      Cancer Paternal Grandfather      ? type     Diabetes Paternal Grandfather      Adult onset     Cancer Maternal Grandfather      ? renal     Diabetes Father      Hypertension Father      Cancer - colorectal Father      Coronary Artery Disease Father      Respiratory Daughter      asthma     HEART DISEASE Brother      double bypass     C.A.D. Brother      Cancer Brother      testicular ca     Hypertension Brother      Myocardial Infarction Brother      Breast Cancer No family hx of          Objective  BP  "131/69 (BP Location: Left arm, Patient Position: Chair, Cuff Size: Adult Regular)  Ht 5' 1.5\" (1.562 m)  Wt 164 lb (74.4 kg)  LMP 04/06/2016  BMI 30.49 kg/m2    GENERAL APPEARANCE: healthy, alert and no distress   GAIT: NORMAL  SKIN: no suspicious lesions or rashes  HEENT: Sclera clear, anicteric  CV: good peripheral pulses  RESP: Breathing not labored  NEURO: Normal strength and tone, mentation intact and speech normal  PSYCH:  mentation appears normal and affect normal/bright    Bilateral Knee exam    Inspection:      mild effusion right    Patella:      Mobility -       normal right       Crepitus noted in the patellofemoral joint bilateral    Tender:      medial patellar border right       medial joint line right    Non Tender:      remainder of knee area bilateral    Knee ROM:      Full active and passive ROM with flexion and extension bilateral    Hip ROM:     Full active and passive ROM bilateral    Strength:      5/5 with knee extension bilateral    Special Tests:     neg (-) Shayla right       neg (-) anterior drawer right       neg (-) posterior drawer right       neg (-) varus at 0 deg and 30 deg right       neg (-) valgus at 0 deg and 30 deg right    Gait:      normal    Neurovascular:      2+ peripheral pulses bilaterally and brisk capillary refill       sensation grossly intact    Radiology  I ordered, visualized and reviewed these images with the patient  Xr Knee Standing Ap Bilat [place] Bilat Lat Right    Result Date: 9/8/2018  XR KNEE STANDING AP BILAT SUNRISE BILAT LAT RT, 9/7/2018 3:28 PM Comparison: None History: RIGHT knee pain. Findings: RIGHT knee: Tricompartmental osteophytosis with moderate medial compartmental joint space loss. No fractures are seen. No knee effusion. LEFT knee: Evaluation is limited to the anterior and patellar sunrise projections. Osteophytosis is noted with mild medial compartmental joint space loss. No fractures are seen.     Impression: Medial compartmental " predominant knee osteoarthritis, RIGHT worse than LEFT. No acute bone abnormalities identified. MATTHIEU FELIPE MD    Assessment:  1. Arthritis of right knee      Discussed nature of degenerative arthrosis of the knee. Discussed symptom treatment with over-the-counter medications, ice or heat, topical treatments, and rest if needed. Discussed use of sleeve or wrap for comfort. Discussed benefits of exercise and weight loss to reduce pressure at the knee. Discussed injection therapy. Also briefly discussed future consideration of referral to orthopedic surgery for further evaluation and discussion of arthroplasty.    Plan:  - Today's Plan of Care:  Continue with Rest, Ice, Compression, and Elevation.  Apply ice 10-15 minutes every 2-3 hours  Over the counter medication: Patient's preferred OTC medication as directed on packaging.  Rehab: Physical Therapy: [facility] - [phone]    -We also discussed other future treatment options:  Referral to orthopedic surgeon or Steroid injection of knee    Follow Up: as needed    Concerning signs and symptoms were reviewed.  The patient expressed understanding of this management plan and all questions were answered at this time.    Salma Moreno MD CAQ  Primary Care Sports Medicine  [facility]    Again, thank you for allowing me to participate in the care of your patient.        Sincerely,        Salma Moreno MD

## 2018-09-07 NOTE — MR AVS SNAPSHOT
After Visit Summary   9/7/2018    Rose Mary Rose    MRN: [id number]           Patient Information     Date Of Birth          1965        Visit Information        Provider Department      9/7/2018 3:00 PM Salma Moreno MD [facility]        Today's Diagnoses     Arthritis of right knee    -  1      Care Instructions        Plan:  - Today's Plan of Care:  Continue with Rest, Ice, Compression, and Elevation.  Apply ice 10-15 minutes every 2-3 hours  Over the counter medication: Patient's preferred OTC medication as directed on packaging.  Rehab: Physical Therapy: [facility] - [phone]    -We also discussed other future treatment options:  Referral to orthopedic surgeon or Steroid injection of knee    Follow Up: as needed    If you have any further questions for your physician or physician s care team you can call [phone] and use option 3 to leave a voice message. Calls received during business hours will be returned same day.              Follow-ups after your visit        Additional Services     PHYSICAL THERAPY REFERRAL       *This therapy referral will be filtered to a centralized scheduling office at [facility] and the patient will receive a call to schedule an appointment at a [facility] location most convenient for them. *     [facility] provides Physical Therapy evaluation and treatment and many specialty services across the [facility] system.  If requesting a specialty program, please choose from the list below.    If you have not heard from the scheduling office within 2 business days, please call [phone] for all locations, with the exception of [place], please call [phone] and [facility], please call [phone]  Treatment: Evaluation & Treatment  Special Instructions/Modalities: evaluate and treat  Special Programs: None    Please be aware that coverage of these services is subject  "to the terms and limitations of your health insurance plan.  Call member services at your health plan with any benefit or coverage questions.      **Note to Provider:  If you are referring outside of [place] for the therapy appointment, please list the name of the location in the \"special instructions\" above, print the referral and give to the patient to schedule the appointment.                  Who to contact     If you have questions or need follow up information about today's clinic visit or your schedule please contact [facility] directly at [phone].  Normal or non-critical lab and imaging results will be communicated to you by Butterhart, letter or phone within 4 business days after the clinic has received the results. If you do not hear from us within 7 days, please contact the clinic through InVisioneer or phone. If you have a critical or abnormal lab result, we will notify you by phone as soon as possible.  Submit refill requests through InVisioneer or call your pharmacy and they will forward the refill request to us. Please allow 3 business days for your refill to be completed.          Additional Information About Your Visit        InVisioneer Information     InVisioneer gives you secure access to your electronic health record. If you see a primary care provider, you can also send messages to your care team and make appointments. If you have questions, please call your primary care clinic.  If you do not have a primary care provider, please call [phone] and they will assist you.        Care EveryWhere ID     This is your Care EveryWhere ID. This could be used by other organizations to access your [place] medical records  [id number]        Your Vitals Were     Height Last Period BMI (Body Mass Index)             5' 1.5\" (1.562 m) 04/06/2016 30.49 kg/m2          Blood Pressure from Last 3 Encounters:   09/07/18 131/69   08/17/18 138/84   06/05/18 122/72    Weight from Last 3 " Encounters:   09/07/18 164 lb (74.4 kg)   08/17/18 164 lb 1.6 oz (74.4 kg)   06/05/18 164 lb 6.4 oz (74.6 kg)              We Performed the Following     PHYSICAL THERAPY REFERRAL        Primary Care Provider Office Phone # Fax #    REDD Martinez [phone] [address] [facility] [address]        Equal Access to Services     EDDA IQBAL : Hadii aad ku hadasho Soomaali, waaxda luqadaha, qaybta kaalmada adeegyada, waxay idiin hayaan adeeg kharash la'janis . So [facility] [phone].    ATENCIÓN: Si habla español, tiene a jones disposición servicios gratuitos de asistencia lingüística. [facility] [phone].    We comply with applicable federal civil rights laws and [place] laws. We do not discriminate on the basis of race, color, national origin, age, disability, sex, sexual orientation, or gender identity.            Thank you!     Thank you for choosing [facility]  for your care. Our goal is always to provide you with excellent care. Hearing back from our patients is one way we can continue to improve our services. Please take a few minutes to complete the written survey that you may receive in the mail after your visit with us. Thank you!             Your Updated Medication List - Protect others around you: Learn how to safely use, store and throw away your medicines at www.disposemymeds.org.          This list is accurate as of 9/7/18  3:54 PM.  Always use your most recent med list.                   Brand Name Dispense Instructions for use Diagnosis    * albuterol 108 (90 Base) MCG/ACT inhaler    PROAIR HFA/PROVENTIL HFA/VENTOLIN HFA    3 Inhaler    Inhale 2 puffs into the lungs every 6 hours as needed for shortness of breath / dyspnea or wheezing    Moderate persistent asthma without complication       * albuterol (2.5 MG/3ML) 0.083% neb solution     3 Box    Take 1 vial (2.5 mg) by nebulization every 4 hours as needed for shortness of  breath / dyspnea    Moderate persistent asthma with exacerbation       beclomethasone HFA 80 MCG/ACT inhaler    QVAR REDIHALER    3 Inhaler    Inhale 2 puffs into the lungs 2 times daily    Moderate persistent asthma without complication       clobetasol 0.05 % cream    TEMOVATE    60 g    Apply sparingly to affected area twice daily as needed.  Do not apply to face.    Dyshidrotic eczema       ipratropium - albuterol 0.5 mg/2.5 mg/3 mL 0.5-2.5 (3) MG/3ML neb solution    DUONEB    1 Box    Take 1 vial (3 mLs) by nebulization every 6 hours as needed for shortness of breath / dyspnea or wheezing    Moderate persistent asthma without complication       levothyroxine 25 MCG tablet    SYNTHROID/LEVOTHROID    90 tablet    Take 1 tablet (25 mcg) by mouth daily    Acquired hypothyroidism       lisinopril 10 MG tablet    PRINIVIL/ZESTRIL    90 tablet    Take 1 tablet (10 mg) by mouth daily    Essential hypertension with goal blood pressure less than 140/90       loratadine 10 MG tablet    CLARITIN    90 tablet    Take 1 tablet (10 mg) by mouth daily    Moderate persistent asthma without complication       omeprazole 40 MG capsule    priLOSEC    30 capsule    Take 1 capsule (40 mg) by mouth daily Take 30-60 minutes before a meal.    Gastroesophageal reflux disease without esophagitis       * Notice:  This list has 2 medication(s) that are the same as other medications prescribed for you. Read the directions carefully, and ask your doctor or other care provider to review them with you.

## 2018-09-21 ENCOUNTER — OFFICE VISIT (OUTPATIENT)
Dept: ORTHOPEDICS | Facility: CLINIC | Age: 53
End: 2018-09-21
Payer: COMMERCIAL

## 2018-09-21 VITALS
HEIGHT: 62 IN | DIASTOLIC BLOOD PRESSURE: 80 MMHG | BODY MASS INDEX: 30.18 KG/M2 | SYSTOLIC BLOOD PRESSURE: 132 MMHG | WEIGHT: 164 LBS

## 2018-09-21 DIAGNOSIS — M17.11 ARTHRITIS OF RIGHT KNEE: Primary | ICD-10-CM

## 2018-09-21 PROCEDURE — 99213 OFFICE O/P EST LOW 20 MIN: CPT | Performed by: PEDIATRICS

## 2018-09-21 NOTE — LETTER
9/21/2018         RE: Rose Mary Rose  [address] [facility] [address]        Dear Colleague,    Thank you for referring your patient, Rose Mary Rose, to the [facility]. Please see a copy of my visit note below.    Sports Medicine Clinic Visit - Interim History September 21, 2018    PCP: Soledad Bauman    Rose Mary Rose is a 53 year old female who is seen in f/u up for Arthritis of right knee. Since last visit on 9/7/18, patient reports of a slight improvement in her knee pain which she contributes to new footwear. She reports pain with prolonged walking. She has not started physical therapy at this time. She is interested in discussing a knee injection.    Symptoms are better with: Tylenol and Rest  Symptoms are worse with: walking  Additional Features:   Positive: instability and numbness   Negative: swelling, bruising, popping, grinding, catching, locking, paresthesias and weakness    Social History: Desk work    Review of Systems  Skin: no bruising, no swelling  Musculoskeletal: as above  Neurologic: no numbness, paresthesias  Remainder of review of systems is negative including constitutional, CV, pulmonary, GI, except as noted in HPI or medical history.    Patient's current problem list, past medical and surgical history, and family history were reviewed.    Patient Active Problem List   Diagnosis     Obesity     Enlarged thyroid with 2mm cyst      Family history of colon cancer     CARDIOVASCULAR SCREENING; LDL GOAL LESS THAN 130     Family history of diabetes mellitus     GERD (gastroesophageal reflux disease)     Moderate persistent asthma     Prediabetes     NAFLD (nonalcoholic fatty liver disease)     HTN (hypertension)     Acquired hypothyroidism     Advanced directives, counseling/discussion     Past Medical History:   Diagnosis Date     Abnormal Papanicolaou smear of cervix and cervical HPV 2003    Cryo  (path results?)     Advanced  "directives, counseling/discussion 4/20/2018    Advance Care Planning 4/20/2018: ACP Review of Chart / Resources Provided:  Reviewed chart for advance care plan.  Rose Mary Rose has been provided information and resources to begin or update their advance care plan.  Added by Rachelle Buchanan        Eczema      Human papillomavirus in conditions classified elsewhere and of unspecified site     Genital warts     Past Surgical History:   Procedure Laterality Date     COLONOSCOPY  12/3/2012    Procedure: COLONOSCOPY;  Colonoscopy;  Surgeon: Darnell Siddiqui MD;  Location: [facility]     COLONOSCOPY N/A 6/4/2018    Procedure: COLONOSCOPY;  colonoscopy;  Surgeon: Nakul Antonio MD;  Location: [facility]     ESOPHAGOSCOPY, GASTROSCOPY, DUODENOSCOPY (EGD), COMBINED N/A 5/5/2017    Procedure: COMBINED ESOPHAGOSCOPY, GASTROSCOPY, DUODENOSCOPY (EGD), BIOPSY SINGLE OR MULTIPLE;  Gastroscopy;  Surgeon: Yonny Zambrano MD;  Location: [facility]     NO HISTORY OF SURGERY  8/2007     Family History   Problem Relation Age of Onset     Hypertension Mother      Diabetes Mother      C.A.D. Maternal Grandmother      Hypertension Maternal Grandmother      Cerebrovascular Disease Maternal Grandmother      Cancer Paternal Grandfather      ? type     Diabetes Paternal Grandfather      Adult onset     Cancer Maternal Grandfather      ? renal     Diabetes Father      Hypertension Father      Cancer - colorectal Father      Coronary Artery Disease Father      Respiratory Daughter      asthma     HEART DISEASE Brother      double bypass     C.A.D. Brother      Cancer Brother      testicular ca     Hypertension Brother      Myocardial Infarction Brother      Breast Cancer No family hx of        Objective  /80 (BP Location: Left arm, Patient Position: Chair, Cuff Size: Adult Regular)  Ht 5' 1.5\" (1.562 m)  Wt 164 lb (74.4 kg)  LMP 04/06/2016  BMI 30.49 kg/m2    GENERAL APPEARANCE: healthy, alert and no distress   GAIT: NORMAL  SKIN: no " suspicious lesions or rashes  HEENT: Sclera clear, anicteric  CV: good peripheral pulses  RESP: Breathing not labored  NEURO: Normal strength and tone, mentation intact and speech normal  PSYCH:  mentation appears normal and affect normal/bright    Bilateral Knee exam  Inspection:      mild effusion right     Patella:      Mobility -       normal right       Crepitus noted in the patellofemoral joint bilateral     Tender:      medial patellar border right       medial joint line right     Non Tender:      remainder of knee area bilateral     Knee ROM:      Full active and passive ROM with flexion and extension bilateral     Hip ROM:     Full active and passive ROM bilateral     Strength:      5/5 with knee extension bilateral     Special Tests:     neg (-) Shayla right       neg (-) anterior drawer right       neg (-) posterior drawer right       neg (-) varus at 0 deg and 30 deg right       neg (-) valgus at 0 deg and 30 deg right     Gait:      normal     Neurovascular:      2+ peripheral pulses bilaterally and brisk capillary refill       sensation grossly intact    Radiology  I visualized and reviewed these images with the patient  Xr Knee Standing Ap Bilat [place] Bilat Lat Right  Result Date: 9/8/2018  XR KNEE STANDING AP BILAT SUNRISE BILAT LAT RT, 9/7/2018 3:28 PM Comparison: None History: RIGHT knee pain. Findings: RIGHT knee: Tricompartmental osteophytosis with moderate medial compartmental joint space loss. No fractures are seen. No knee effusion. LEFT knee: Evaluation is limited to the anterior and patellar sunrise projections. Osteophytosis is noted with mild medial compartmental joint space loss. No fractures are seen.   Impression: Medial compartmental predominant knee osteoarthritis, RIGHT worse than LEFT. No acute bone abnormalities identified. MATTHIEU FELIPE MD    Assessment:  1. Arthritis of right knee      Discussed nature of degenerative arthrosis of the knee. Discussed symptom treatment with  over-the-counter medications, ice or heat, topical treatments, and rest if needed. Discussed use of sleeve or wrap for comfort. Discussed benefits of exercise and weight loss to reduce pressure at the knee. Discussed injection therapy. Also briefly discussed future consideration of referral to orthopedic surgery for further evaluation and discussion of arthroplasty.    Plan:  Discussed the assessment with the patient.  Continue current supportive care, start PT  Follow up: if desiring injection in the future    Concerning signs and symptoms were reviewed.  The patient expressed understanding of this management plan and all questions were answered at this time.    Salma Moreno MD CAQ  Primary Care Sports Medicine  [facility]      Again, thank you for allowing me to participate in the care of your patient.        Sincerely,        Salma Moreno MD

## 2018-09-21 NOTE — MR AVS SNAPSHOT
"              After Visit Summary   9/21/2018    Rose Mary Rose    MRN: [id number]           Patient Information     Date Of Birth          1965        Visit Information        Provider Department      9/21/2018 3:20 PM Salma Moreno MD [facility]        Today's Diagnoses     Arthritis of right knee    -  1       Follow-ups after your visit        Follow-up notes from your care team     Return if symptoms worsen or fail to improve.      Who to contact     If you have questions or need follow up information about today's clinic visit or your schedule please contact [facility] directly at [phone].  Normal or non-critical lab and imaging results will be communicated to you by MyChart, letter or phone within 4 business days after the clinic has received the results. If you do not hear from us within 7 days, please contact the clinic through Sun-eeehart or phone. If you have a critical or abnormal lab result, we will notify you by phone as soon as possible.  Submit refill requests through CleanFish or call your pharmacy and they will forward the refill request to us. Please allow 3 business days for your refill to be completed.          Additional Information About Your Visit        MyChart Information     CleanFish gives you secure access to your electronic health record. If you see a primary care provider, you can also send messages to your care team and make appointments. If you have questions, please call your primary care clinic.  If you do not have a primary care provider, please call [phone] and they will assist you.        Care EveryWhere ID     This is your Care EveryWhere ID. This could be used by other organizations to access your [place] medical records  [id number]        Your Vitals Were     Height Last Period BMI (Body Mass Index)             5' 1.5\" (1.562 m) 04/06/2016 30.49 kg/m2          Blood Pressure from Last 3 " Encounters:   09/21/18 132/80   09/07/18 131/69   08/17/18 138/84    Weight from Last 3 Encounters:   09/21/18 164 lb (74.4 kg)   09/07/18 164 lb (74.4 kg)   08/17/18 164 lb 1.6 oz (74.4 kg)              Today, you had the following     No orders found for display       Primary Care Provider Office Phone # Fax #    Soledad Osman Margivladimir, APRN [facility] [phone] [address] [facility] [address]        Equal Access to Services     JOHN IQBAL : Hadii aad ku hadasho Soomaali, waaxda luqadaha, qaybta kaalmada adeegyada, patrick zhou hayjanis allen . So [facility] [phone].    ATENCIÓN: Si habla español, tiene a jones disposición servicios gratuitos de asistencia lingüística. Llame al [phone].    We comply with applicable federal civil rights laws and [place] laws. We do not discriminate on the basis of race, color, national origin, age, disability, sex, sexual orientation, or gender identity.            Thank you!     Thank you for choosing [facility]  for your care. Our goal is always to provide you with excellent care. Hearing back from our patients is one way we can continue to improve our services. Please take a few minutes to complete the written survey that you may receive in the mail after your visit with us. Thank you!             Your Updated Medication List - Protect others around you: Learn how to safely use, store and throw away your medicines at www.disposemymeds.org.          This list is accurate as of 9/21/18 11:59 PM.  Always use your most recent med list.                   Brand Name Dispense Instructions for use Diagnosis    * albuterol 108 (90 Base) MCG/ACT inhaler    PROAIR HFA/PROVENTIL HFA/VENTOLIN HFA    3 Inhaler    Inhale 2 puffs into the lungs every 6 hours as needed for shortness of breath / dyspnea or wheezing    Moderate persistent asthma without complication       * albuterol (2.5 MG/3ML) 0.083% neb solution     3  Box    Take 1 vial (2.5 mg) by nebulization every 4 hours as needed for shortness of breath / dyspnea    Moderate persistent asthma with exacerbation       beclomethasone HFA 80 MCG/ACT inhaler    QVAR REDIHALER    3 Inhaler    Inhale 2 puffs into the lungs 2 times daily    Moderate persistent asthma without complication       clobetasol 0.05 % cream    TEMOVATE    60 g    Apply sparingly to affected area twice daily as needed.  Do not apply to face.    Dyshidrotic eczema       ipratropium - albuterol 0.5 mg/2.5 mg/3 mL 0.5-2.5 (3) MG/3ML neb solution    DUONEB    1 Box    Take 1 vial (3 mLs) by nebulization every 6 hours as needed for shortness of breath / dyspnea or wheezing    Moderate persistent asthma without complication       levothyroxine 25 MCG tablet    SYNTHROID/LEVOTHROID    90 tablet    Take 1 tablet (25 mcg) by mouth daily    Acquired hypothyroidism       lisinopril 10 MG tablet    PRINIVIL/ZESTRIL    90 tablet    Take 1 tablet (10 mg) by mouth daily    Essential hypertension with goal blood pressure less than 140/90       loratadine 10 MG tablet    CLARITIN    90 tablet    Take 1 tablet (10 mg) by mouth daily    Moderate persistent asthma without complication       omeprazole 40 MG capsule    priLOSEC    30 capsule    Take 1 capsule (40 mg) by mouth daily Take 30-60 minutes before a meal.    Gastroesophageal reflux disease without esophagitis       * Notice:  This list has 2 medication(s) that are the same as other medications prescribed for you. Read the directions carefully, and ask your doctor or other care provider to review them with you.

## 2018-09-21 NOTE — PROGRESS NOTES
Sports Medicine Clinic Visit - Interim History September 21, 2018    PCP: Soledad Bauman    Rose Mary Rose is a 53 year old female who is seen in f/u up for Arthritis of right knee. Since last visit on 9/7/18, patient reports of a slight improvement in her knee pain which she contributes to new footwear. She reports pain with prolonged walking. She has not started physical therapy at this time. She is interested in discussing a knee injection.    Symptoms are better with: Tylenol and Rest  Symptoms are worse with: walking  Additional Features:   Positive: instability and numbness   Negative: swelling, bruising, popping, grinding, catching, locking, paresthesias and weakness    Social History: Desk work    Review of Systems  Skin: no bruising, no swelling  Musculoskeletal: as above  Neurologic: no numbness, paresthesias  Remainder of review of systems is negative including constitutional, CV, pulmonary, GI, except as noted in HPI or medical history.    Patient's current problem list, past medical and surgical history, and family history were reviewed.    Patient Active Problem List   Diagnosis     Obesity     Enlarged thyroid with 2mm cyst      Family history of colon cancer     CARDIOVASCULAR SCREENING; LDL GOAL LESS THAN 130     Family history of diabetes mellitus     GERD (gastroesophageal reflux disease)     Moderate persistent asthma     Prediabetes     NAFLD (nonalcoholic fatty liver disease)     HTN (hypertension)     Acquired hypothyroidism     Advanced directives, counseling/discussion     Past Medical History:   Diagnosis Date     Abnormal Papanicolaou smear of cervix and cervical HPV 2003    Cryo  (path results?)     Advanced directives, counseling/discussion 4/20/2018    Advance Care Planning 4/20/2018: ACP Review of Chart / Resources Provided:  Reviewed chart for advance care plan.  Rose Mary Rose has been provided information and resources to begin or update their advance care  "plan.  Added by Rachelle Buchanan        Eczema      Human papillomavirus in conditions classified elsewhere and of unspecified site     Genital warts     Past Surgical History:   Procedure Laterality Date     COLONOSCOPY  12/3/2012    Procedure: COLONOSCOPY;  Colonoscopy;  Surgeon: Darnell Siddiqui MD;  Location: [facility]     COLONOSCOPY N/A 6/4/2018    Procedure: COLONOSCOPY;  colonoscopy;  Surgeon: Nakul Antonio MD;  Location: [facility]     ESOPHAGOSCOPY, GASTROSCOPY, DUODENOSCOPY (EGD), COMBINED N/A 5/5/2017    Procedure: COMBINED ESOPHAGOSCOPY, GASTROSCOPY, DUODENOSCOPY (EGD), BIOPSY SINGLE OR MULTIPLE;  Gastroscopy;  Surgeon: Yonny Zambrano MD;  Location: [facility]     NO HISTORY OF SURGERY  8/2007     Family History   Problem Relation Age of Onset     Hypertension Mother      Diabetes Mother      C.A.D. Maternal Grandmother      Hypertension Maternal Grandmother      Cerebrovascular Disease Maternal Grandmother      Cancer Paternal Grandfather      ? type     Diabetes Paternal Grandfather      Adult onset     Cancer Maternal Grandfather      ? renal     Diabetes Father      Hypertension Father      Cancer - colorectal Father      Coronary Artery Disease Father      Respiratory Daughter      asthma     HEART DISEASE Brother      double bypass     C.A.D. Brother      Cancer Brother      testicular ca     Hypertension Brother      Myocardial Infarction Brother      Breast Cancer No family hx of        Objective  /80 (BP Location: Left arm, Patient Position: Chair, Cuff Size: Adult Regular)  Ht 5' 1.5\" (1.562 m)  Wt 164 lb (74.4 kg)  LMP 04/06/2016  BMI 30.49 kg/m2    GENERAL APPEARANCE: healthy, alert and no distress   GAIT: NORMAL  SKIN: no suspicious lesions or rashes  HEENT: Sclera clear, anicteric  CV: good peripheral pulses  RESP: Breathing not labored  NEURO: Normal strength and tone, mentation intact and speech normal  PSYCH:  mentation appears normal and affect normal/bright    Bilateral Knee " exam  Inspection:      mild effusion right     Patella:      Mobility -       normal right       Crepitus noted in the patellofemoral joint bilateral     Tender:      medial patellar border right       medial joint line right     Non Tender:      remainder of knee area bilateral     Knee ROM:      Full active and passive ROM with flexion and extension bilateral     Hip ROM:     Full active and passive ROM bilateral     Strength:      5/5 with knee extension bilateral     Special Tests:     neg (-) Shayla right       neg (-) anterior drawer right       neg (-) posterior drawer right       neg (-) varus at 0 deg and 30 deg right       neg (-) valgus at 0 deg and 30 deg right     Gait:      normal     Neurovascular:      2+ peripheral pulses bilaterally and brisk capillary refill       sensation grossly intact    Radiology  I visualized and reviewed these images with the patient  Xr Knee Standing Ap Bilat [place] Bilat Lat Right  Result Date: 9/8/2018  XR KNEE STANDING AP BILAT SUNRISE BILAT LAT RT, 9/7/2018 3:28 PM Comparison: None History: RIGHT knee pain. Findings: RIGHT knee: Tricompartmental osteophytosis with moderate medial compartmental joint space loss. No fractures are seen. No knee effusion. LEFT knee: Evaluation is limited to the anterior and patellar sunrise projections. Osteophytosis is noted with mild medial compartmental joint space loss. No fractures are seen.   Impression: Medial compartmental predominant knee osteoarthritis, RIGHT worse than LEFT. No acute bone abnormalities identified. MATTHIEU FELIPE MD    Assessment:  1. Arthritis of right knee      Discussed nature of degenerative arthrosis of the knee. Discussed symptom treatment with over-the-counter medications, ice or heat, topical treatments, and rest if needed. Discussed use of sleeve or wrap for comfort. Discussed benefits of exercise and weight loss to reduce pressure at the knee. Discussed injection therapy. Also briefly discussed future  consideration of referral to orthopedic surgery for further evaluation and discussion of arthroplasty.    Plan:  Discussed the assessment with the patient.  Continue current supportive care, start PT  Follow up: if desiring injection in the future    Concerning signs and symptoms were reviewed.  The patient expressed understanding of this management plan and all questions were answered at this time.    Salma Moreno MD CAQ  Primary Care Sports Medicine  [facility]

## 2019-01-22 ENCOUNTER — APPOINTMENT (OUTPATIENT)
Dept: GENERAL RADIOLOGY | Facility: CLINIC | Age: 54
End: 2019-01-22
Payer: COMMERCIAL

## 2019-01-22 ENCOUNTER — HOSPITAL ENCOUNTER (EMERGENCY)
Facility: CLINIC | Age: 54
Discharge: HOME | End: 2019-01-22
Attending: PHYSICIAN ASSISTANT | Admitting: PHYSICIAN ASSISTANT
Payer: COMMERCIAL

## 2019-01-22 VITALS
OXYGEN SATURATION: 96 % | SYSTOLIC BLOOD PRESSURE: 144 MMHG | TEMPERATURE: 99.8 F | DIASTOLIC BLOOD PRESSURE: 86 MMHG | HEART RATE: 104 BPM

## 2019-01-22 DIAGNOSIS — J45.41 MODERATE PERSISTENT ASTHMA WITH EXACERBATION: ICD-10-CM

## 2019-01-22 LAB
FLUAV+FLUBV AG SPEC QL: NEGATIVE
FLUAV+FLUBV AG SPEC QL: NEGATIVE
SPECIMEN SOURCE: NORMAL

## 2019-01-22 PROCEDURE — 94640 AIRWAY INHALATION TREATMENT: CPT

## 2019-01-22 PROCEDURE — 99284 EMERGENCY DEPT VISIT MOD MDM: CPT | Mod: 25 | Performed by: PHYSICIAN ASSISTANT

## 2019-01-22 PROCEDURE — 99284 EMERGENCY DEPT VISIT MOD MDM: CPT | Mod: Z6 | Performed by: PHYSICIAN ASSISTANT

## 2019-01-22 PROCEDURE — 25000125 ZZHC RX 250: Performed by: PHYSICIAN ASSISTANT

## 2019-01-22 PROCEDURE — 87804 INFLUENZA ASSAY W/OPTIC: CPT | Mod: 91 | Performed by: PHYSICIAN ASSISTANT

## 2019-01-22 PROCEDURE — 71046 X-RAY EXAM CHEST 2 VIEWS: CPT

## 2019-01-22 RX ORDER — IPRATROPIUM BROMIDE AND ALBUTEROL SULFATE 2.5; .5 MG/3ML; MG/3ML
3 SOLUTION RESPIRATORY (INHALATION) ONCE
Status: COMPLETED | OUTPATIENT
Start: 2019-01-22 | End: 2019-01-22

## 2019-01-22 RX ORDER — PREDNISONE 20 MG/1
TABLET ORAL
Qty: 10 TABLET | Refills: 0 | Status: SHIPPED | OUTPATIENT
Start: 2019-01-22 | End: 2019-01-28

## 2019-01-22 RX ORDER — AZITHROMYCIN 250 MG/1
TABLET, FILM COATED ORAL
Qty: 6 TABLET | Refills: 0 | Status: SHIPPED | OUTPATIENT
Start: 2019-01-22 | End: 2019-01-27

## 2019-01-22 RX ADMIN — IPRATROPIUM BROMIDE AND ALBUTEROL SULFATE 3 ML: .5; 3 SOLUTION RESPIRATORY (INHALATION) at 12:58

## 2019-01-22 ASSESSMENT — ENCOUNTER SYMPTOMS
ARTHRALGIAS: 1
FEVER: 1
WHEEZING: 1
CHEST TIGHTNESS: 1
COUGH: 1
HEADACHES: 1
SHORTNESS OF BREATH: 1
CARDIOVASCULAR NEGATIVE: 1

## 2019-01-22 NOTE — ED AVS SNAPSHOT
[facility] Emergency Department  [address] [address]  Phone:  [phone]  Fax:  [phone]                                    Rose Mary Rose   MRN: [id number]    Department:  [facility] Emergency Department   Date of Visit:  1/22/2019           After Visit Summary Signature Page    I have received my discharge instructions, and my questions have been answered. I have discussed any challenges I see with this plan with the nurse or doctor.    ..........................................................................................................................................  Patient/Patient Representative Signature      ..........................................................................................................................................  Patient Representative Print Name and Relationship to Patient    ..................................................               ................................................  Date                                   Time    ..........................................................................................................................................  Reviewed by Signature/Title    ...................................................              ..............................................  Date                                               Time          22EPIC Rev 08/18

## 2019-01-22 NOTE — ED PROVIDER NOTES
History     Chief Complaint   Patient presents with     Cold Symptoms     HPI  Rose Mary Rose is a 53 year old female with history of hypertension, GERD, moderate persistent asthma who presents with complaints of subjective fevers, headache, body aches, and cough since yesterday.  Patient states her asthma has subsequently been exacerbated she has become more short-of-breath and wheezing.  Patient has been using her Albuterol inhaler every 3 hours or so.  Denies rash, neck pain/stiffness, nasal congestion, rhinorrhea, chest pain, nausea, or vomiting.  Patient received influenza vaccination this year.      Allergies:  Allergies   Allergen Reactions     Penicillins Rash       Problem List:    Patient Active Problem List    Diagnosis Date Noted     Advanced directives, counseling/discussion 04/20/2018     Priority: Medium     Advance Care Planning 4/20/2018: ACP Review of Chart / Resources Provided:  Reviewed chart for advance care plan.  Rose Mary Rose has been provided information and resources to begin or update their advance care plan.  Added by Rachelle Buchanan             Acquired hypothyroidism 10/31/2017     Priority: Medium     HTN (hypertension) 03/31/2014     Priority: Medium     NAFLD (nonalcoholic fatty liver disease) 03/27/2014     Priority: Medium     Prediabetes 08/31/2012     Priority: Medium     Moderate persistent asthma 04/25/2012     Priority: Medium     Family history of diabetes mellitus 04/02/2012     Priority: Medium     GERD (gastroesophageal reflux disease) 04/02/2012     Priority: Medium     Zantac not effective       CARDIOVASCULAR SCREENING; LDL GOAL LESS THAN 130 03/19/2012     Priority: Medium     Family history of colon cancer 09/08/2009     Priority: Medium     Enlarged thyroid with 2mm cyst  11/01/2007     Priority: Medium     Sono shows mildly enlarged thryoid with 2mm cyst right lobe. TSH wnl. Sono from 1/2002 showed prominence of thyroid and no cyst.       Obesity  08/21/2007     Priority: Medium     Problem list name updated by automated process. Provider to review          Past Medical History:    Past Medical History:   Diagnosis Date     Abnormal Papanicolaou smear of cervix and cervical HPV 2003     Advanced directives, counseling/discussion 4/20/2018     Eczema      Human papillomavirus in conditions classified elsewhere and of unspecified site        Past Surgical History:    Past Surgical History:   Procedure Laterality Date     COLONOSCOPY  12/3/2012    Procedure: COLONOSCOPY;  Colonoscopy;  Surgeon: Darnell Siddiqui MD;  Location: [facility]     COLONOSCOPY N/A 6/4/2018    Procedure: COLONOSCOPY;  colonoscopy;  Surgeon: Nakul Antonio MD;  Location: [facility]     ESOPHAGOSCOPY, GASTROSCOPY, DUODENOSCOPY (EGD), COMBINED N/A 5/5/2017    Procedure: COMBINED ESOPHAGOSCOPY, GASTROSCOPY, DUODENOSCOPY (EGD), BIOPSY SINGLE OR MULTIPLE;  Gastroscopy;  Surgeon: Yonny Zambrano MD;  Location: [facility]     NO HISTORY OF SURGERY  8/2007       Family History:    Family History   Problem Relation Age of Onset     Hypertension Mother      Diabetes Mother      C.A.D. Maternal Grandmother      Hypertension Maternal Grandmother      Cerebrovascular Disease Maternal Grandmother      Cancer Paternal Grandfather         ? type     Diabetes Paternal Grandfather         Adult onset     Cancer Maternal Grandfather         ? renal     Diabetes Father      Hypertension Father      Cancer - colorectal Father      Coronary Artery Disease Father      Respiratory Daughter         asthma     Heart Disease Brother         double bypass     C.A.D. Brother      Cancer Brother         testicular ca     Hypertension Brother      Myocardial Infarction Brother      Breast Cancer No family hx of        Social History:  Marital Status:   [4]  Social History     Tobacco Use     Smoking status: Former Smoker     Packs/day: 0.25     Years: 25.00     Pack years: 6.25     Types: Cigarettes     Last attempt to  quit: 3/14/2016     Years since quittin.8     Smokeless tobacco: Never Used   Substance Use Topics     Alcohol use: No     Comment: 1 beer per month, usually none     Drug use: No        Medications:      albuterol (2.5 MG/3ML) 0.083% neb solution   albuterol (PROAIR HFA/PROVENTIL HFA/VENTOLIN HFA) 108 (90 Base) MCG/ACT Inhaler   azithromycin (ZITHROMAX Z-SAM) 250 MG tablet   beclomethasone HFA (QVAR REDIHALER) 80 MCG/ACT AERB inhaler   ipratropium - albuterol 0.5 mg/2.5 mg/3 mL (DUONEB) 0.5-2.5 (3) MG/3ML neb solution   levothyroxine (SYNTHROID/LEVOTHROID) 25 MCG tablet   lisinopril (PRINIVIL/ZESTRIL) 10 MG tablet   loratadine (CLARITIN) 10 MG tablet   omeprazole (PRILOSEC) 40 MG capsule   predniSONE (DELTASONE) 20 MG tablet         Review of Systems   Constitutional: Positive for fever.   HENT: Negative.    Respiratory: Positive for cough, chest tightness, shortness of breath and wheezing.    Cardiovascular: Negative.    Musculoskeletal: Positive for arthralgias.   Skin: Negative.    Neurological: Positive for headaches.   All other systems reviewed and are negative.      Physical Exam   BP: 144/86  Pulse: 104  Temp: 99.8  F (37.7  C)  SpO2: 96 %      Physical Exam   Constitutional: She is oriented to person, place, and time. She appears well-developed and well-nourished.  Non-toxic appearance. No distress.   HENT:   Head: Normocephalic and atraumatic.   Right Ear: Hearing, tympanic membrane, external ear and ear canal normal.   Left Ear: Hearing, tympanic membrane, external ear and ear canal normal.   Nose: Nose normal. No mucosal edema or rhinorrhea.   Mouth/Throat: Uvula is midline, oropharynx is clear and moist and mucous membranes are normal. No uvula swelling. No oropharyngeal exudate, posterior oropharyngeal edema, posterior oropharyngeal erythema or tonsillar abscesses.   Eyes: Conjunctivae and EOM are normal. Pupils are equal, round, and reactive to light.   Neck: Normal range of motion and full  passive range of motion without pain. Neck supple. No neck rigidity. Normal range of motion present.   Cardiovascular: Normal rate, regular rhythm and normal heart sounds.   Pulmonary/Chest: Effort normal and breath sounds normal. No stridor. No respiratory distress. She has no decreased breath sounds. She has no wheezes. She has no rhonchi. She has no rales.   Musculoskeletal: Normal range of motion.   Lymphadenopathy:     She has no cervical adenopathy.   Neurological: She is alert and oriented to person, place, and time.   Skin: Skin is warm and dry. No rash noted.       ED Course        Procedures    Results for orders placed or performed during the hospital encounter of 01/22/19 (from the past 24 hour(s))   Influenza A/B antigen   Result Value Ref Range    Influenza A/B Agn Specimen Nasal     Influenza A Negative NEG^Negative    Influenza B Negative NEG^Negative   XR Chest 2 Views    Narrative    XR CHEST 2 VW   1/22/2019 12:31 PM     HISTORY: cough, wheezing, fevers    COMPARISON: Film dated 12/4/2016    FINDINGS: The heart is negative.  The lungs are clear. A calcified  granulomas seen at the right lung apex. The pulmonary vasculature is  normal.  The bones and soft tissues are unremarkable.      Impression    IMPRESSION: No active areas of infiltrate.        LISET TERRAZAS MD       Medications   ipratropium - albuterol 0.5 mg/2.5 mg/3 mL (DUONEB) neb solution 3 mL (3 mLs Nebulization Given 1/22/19 1258)       Assessments & Plan (with Medical Decision Making)     Pt is a 53 year old female with history of hypertension, GERD, moderate persistent asthma who presents with complaints of subjective fevers, headache, body aches, and cough since yesterday.  Patient states her asthma has subsequently been exacerbated she has become more short-of-breath and wheezing.  Patient has been using her Albuterol inhaler every 3 hours or so.  Patient received influenza vaccination this year.  Pt is afebrile on arrival.  Exam as  above.  Influenza testing was negative.  Chest x-ray was negative for pneumonia or acute pathology.  Discussed results with patient.  She states when she develops similar asthma exacerbations in the past, she typically gets a steroid burst and Azithromycin.  She has enough of her Albuterol inhaler at home.  Patient is requesting a breathing treatment before she discharges home.  Return precautions were reviewed.  Hand-outs were provided.    Patient was sent with Prednisone burst and Azithromycin and was instructed to follow-up with PCP if no improvement in 3-5 days for continued care and management or sooner if new or worsening symptoms.  She is to return to the ED for persistent and/or worsening symptoms.  Patient expressed understanding of the diagnosis and plan and was discharged home in good condition.    I have reviewed the nursing notes.    I have reviewed the findings, diagnosis, plan and need for follow up with the patient.       Medication List      Started    azithromycin 250 MG tablet  Commonly known as:  ZITHROMAX Z-SAM  Two tablets on the first day, then one tablet daily for the next 4 days     predniSONE 20 MG tablet  Commonly known as:  DELTASONE  Take two tablets (= 40mg) each day for 5 (five) days            Final diagnoses:   Moderate persistent asthma with exacerbation       1/22/2019   [facility] EMERGENCY DEPARTMENT      Disclaimer:  This note consists of symbols derived from keyboarding, dictation and/or voice recognition software.  As a result, there may be errors in the script that have gone undetected.  Please consider this when interpreting information found in this chart.     Medina Ely PA-C  01/22/19 [id number]       Medina Ely PA-C  01/22/19 [id number]

## 2019-01-24 ENCOUNTER — HOSPITAL ENCOUNTER (EMERGENCY)
Facility: CLINIC | Age: 54
Discharge: HOME | End: 2019-01-24
Attending: FAMILY MEDICINE | Admitting: FAMILY MEDICINE
Payer: COMMERCIAL

## 2019-01-24 VITALS
OXYGEN SATURATION: 94 % | RESPIRATION RATE: 18 BRPM | SYSTOLIC BLOOD PRESSURE: 126 MMHG | DIASTOLIC BLOOD PRESSURE: 62 MMHG | TEMPERATURE: 98 F

## 2019-01-24 DIAGNOSIS — J11.1 INFLUENZA-LIKE ILLNESS: ICD-10-CM

## 2019-01-24 DIAGNOSIS — R06.09 DYSPNEA ON EXERTION: ICD-10-CM

## 2019-01-24 DIAGNOSIS — J45.31 MILD PERSISTENT ASTHMA WITH EXACERBATION: ICD-10-CM

## 2019-01-24 LAB
ANION GAP SERPL CALCULATED.3IONS-SCNC: 9 MMOL/L (ref 3–14)
BASOPHILS # BLD AUTO: 0.1 10E9/L (ref 0–0.2)
BASOPHILS NFR BLD AUTO: 0.5 %
BUN SERPL-MCNC: 10 MG/DL (ref 7–30)
CALCIUM SERPL-MCNC: 8.4 MG/DL (ref 8.5–10.1)
CHLORIDE SERPL-SCNC: 109 MMOL/L (ref 94–109)
CO2 SERPL-SCNC: 23 MMOL/L (ref 20–32)
CREAT SERPL-MCNC: 0.66 MG/DL (ref 0.52–1.04)
D DIMER PPP FEU-MCNC: 0.3 UG/ML FEU (ref 0–0.5)
DIFFERENTIAL METHOD BLD: NORMAL
EOSINOPHIL # BLD AUTO: 0.1 10E9/L (ref 0–0.7)
EOSINOPHIL NFR BLD AUTO: 0.5 %
ERYTHROCYTE [DISTWIDTH] IN BLOOD BY AUTOMATED COUNT: 12.8 % (ref 10–15)
GFR SERPL CREATININE-BSD FRML MDRD: >90 ML/MIN/{1.73_M2}
GLUCOSE SERPL-MCNC: 123 MG/DL (ref 70–99)
HCT VFR BLD AUTO: 44.8 % (ref 35–47)
HGB BLD-MCNC: 14.6 G/DL (ref 11.7–15.7)
IMM GRANULOCYTES # BLD: 0 10E9/L (ref 0–0.4)
IMM GRANULOCYTES NFR BLD: 0.4 %
LYMPHOCYTES # BLD AUTO: 2.1 10E9/L (ref 0.8–5.3)
LYMPHOCYTES NFR BLD AUTO: 22 %
MCH RBC QN AUTO: 31.3 PG (ref 26.5–33)
MCHC RBC AUTO-ENTMCNC: 32.6 G/DL (ref 31.5–36.5)
MCV RBC AUTO: 96 FL (ref 78–100)
MONOCYTES # BLD AUTO: 1.1 10E9/L (ref 0–1.3)
MONOCYTES NFR BLD AUTO: 11 %
NEUTROPHILS # BLD AUTO: 6.3 10E9/L (ref 1.6–8.3)
NEUTROPHILS NFR BLD AUTO: 65.6 %
NRBC # BLD AUTO: 0 10*3/UL
NRBC BLD AUTO-RTO: 0 /100
NT-PROBNP SERPL-MCNC: 91 PG/ML (ref 0–900)
PLATELET # BLD AUTO: 216 10E9/L (ref 150–450)
POTASSIUM SERPL-SCNC: 3.4 MMOL/L (ref 3.4–5.3)
RBC # BLD AUTO: 4.66 10E12/L (ref 3.8–5.2)
SODIUM SERPL-SCNC: 141 MMOL/L (ref 133–144)
TROPONIN I SERPL-MCNC: <0.015 UG/L (ref 0–0.04)
WBC # BLD AUTO: 9.6 10E9/L (ref 4–11)

## 2019-01-24 PROCEDURE — 93005 ELECTROCARDIOGRAM TRACING: CPT | Performed by: FAMILY MEDICINE

## 2019-01-24 PROCEDURE — 85025 COMPLETE CBC W/AUTO DIFF WBC: CPT | Performed by: FAMILY MEDICINE

## 2019-01-24 PROCEDURE — 99284 EMERGENCY DEPT VISIT MOD MDM: CPT | Mod: 25 | Performed by: FAMILY MEDICINE

## 2019-01-24 PROCEDURE — 94640 AIRWAY INHALATION TREATMENT: CPT

## 2019-01-24 PROCEDURE — 25000125 ZZHC RX 250: Performed by: FAMILY MEDICINE

## 2019-01-24 PROCEDURE — 84484 ASSAY OF TROPONIN QUANT: CPT | Performed by: FAMILY MEDICINE

## 2019-01-24 PROCEDURE — 80048 BASIC METABOLIC PNL TOTAL CA: CPT | Performed by: FAMILY MEDICINE

## 2019-01-24 PROCEDURE — 83880 ASSAY OF NATRIURETIC PEPTIDE: CPT | Performed by: FAMILY MEDICINE

## 2019-01-24 PROCEDURE — 85379 FIBRIN DEGRADATION QUANT: CPT | Performed by: FAMILY MEDICINE

## 2019-01-24 PROCEDURE — 93010 ELECTROCARDIOGRAM REPORT: CPT | Mod: Z6 | Performed by: FAMILY MEDICINE

## 2019-01-24 RX ORDER — IPRATROPIUM BROMIDE AND ALBUTEROL SULFATE 2.5; .5 MG/3ML; MG/3ML
3 SOLUTION RESPIRATORY (INHALATION) ONCE
Status: COMPLETED | OUTPATIENT
Start: 2019-01-24 | End: 2019-01-24

## 2019-01-24 RX ADMIN — IPRATROPIUM BROMIDE AND ALBUTEROL SULFATE 3 ML: .5; 3 SOLUTION RESPIRATORY (INHALATION) at 07:01

## 2019-01-24 ASSESSMENT — ENCOUNTER SYMPTOMS
NAUSEA: 0
FREQUENCY: 0
HEADACHES: 0
DIARRHEA: 0
CONSTIPATION: 0
WHEEZING: 1
CHILLS: 0
SORE THROAT: 1
VOMITING: 0
SINUS PRESSURE: 0
BLOOD IN STOOL: 0
PALPITATIONS: 0
SHORTNESS OF BREATH: 1
COUGH: 1
DIAPHORESIS: 0
DYSURIA: 0
ABDOMINAL PAIN: 0
FEVER: 0

## 2019-01-24 NOTE — ED PROVIDER NOTES
History   No chief complaint on file.    HPI  Rose Mary Rose is a 53 year old female who presented with a history of moderate persistent asthma, hypothyroidism, hypertension with a recent upper respiratory infection, febrile illness with dyspnea, and wheezing presenting on 22 January and started on prednisone and azithromycin after a negative chest x-ray and influenza testing negative as well.    She has no history of COPD, but does use tobacco.  She did have pulmonary function testing in 2012 that demonstrated normal DLCO and positive response to bronchodilators.  She has been on Flovent and albuterol as needed since that time but was recently changed to Qvar due to insurance issues in the last couple of months.    She notes that since she was last seen she has had significant dyspnea on exertion despite the prednisone and azithromycin.  She notes that this could be due to anxiousness.  She has no associated chest pain nausea vomiting.  Did note sweats last evening no fever.  Today even performing basic activities such as being in the shower resulted in dyspnea and at work today she felt that she could not walk in from her car.  Her last use of DuoNeb's was at approximately 4 AM.   She noted no VTE  risk factors as below and no known history of coronary disease.  She has no history of diabetes.  No known history of hyperlipidemia.  She has no orthopnea or history of congestive heart failure.  Denies lower extremity edema.   He has experienced wheezing periodically and her cough is productive.  She denies significant upper respiratory symptoms.  No significant rhinorrhea ear pain sinus pressure minimal sore throat.    Denies recent prolonged travel (>3 hours) by car or plane, history or FHx of venous thromboembolism, recent surgery (last 4 weeks), active cancer history, hypercoagulable state, estrogen or other medications/conditions causing VTE or  new unilateral swelling or pain in the legs or  calves.      Allergies:  Allergies   Allergen Reactions     Penicillins Rash       Problem List:    Patient Active Problem List    Diagnosis Date Noted     Advanced directives, counseling/discussion 04/20/2018     Priority: Medium     Advance Care Planning 4/20/2018: ACP Review of Chart / Resources Provided:  Reviewed chart for advance care plan.  Rose Mary Rose has been provided information and resources to begin or update their advance care plan.  Added by Rachelle Buchanan             Acquired hypothyroidism 10/31/2017     Priority: Medium     HTN (hypertension) 03/31/2014     Priority: Medium     NAFLD (nonalcoholic fatty liver disease) 03/27/2014     Priority: Medium     Prediabetes 08/31/2012     Priority: Medium     Moderate persistent asthma 04/25/2012     Priority: Medium     Family history of diabetes mellitus 04/02/2012     Priority: Medium     GERD (gastroesophageal reflux disease) 04/02/2012     Priority: Medium     Zantac not effective       CARDIOVASCULAR SCREENING; LDL GOAL LESS THAN 130 03/19/2012     Priority: Medium     Family history of colon cancer 09/08/2009     Priority: Medium     Enlarged thyroid with 2mm cyst  11/01/2007     Priority: Medium     Sono shows mildly enlarged thryoid with 2mm cyst right lobe. TSH wnl. Sono from 1/2002 showed prominence of thyroid and no cyst.       Obesity 08/21/2007     Priority: Medium     Problem list name updated by automated process. Provider to review          Past Medical History:    Past Medical History:   Diagnosis Date     Abnormal Papanicolaou smear of cervix and cervical HPV 2003     Advanced directives, counseling/discussion 4/20/2018     Eczema      Human papillomavirus in conditions classified elsewhere and of unspecified site        Past Surgical History:    Past Surgical History:   Procedure Laterality Date     COLONOSCOPY  12/3/2012    Procedure: COLONOSCOPY;  Colonoscopy;  Surgeon: Darnell Siddiqui MD;  Location: [facility]     COLONOSCOPY  N/A 2018    Procedure: COLONOSCOPY;  colonoscopy;  Surgeon: Nakul Antonio MD;  Location: [facility]     ESOPHAGOSCOPY, GASTROSCOPY, DUODENOSCOPY (EGD), COMBINED N/A 2017    Procedure: COMBINED ESOPHAGOSCOPY, GASTROSCOPY, DUODENOSCOPY (EGD), BIOPSY SINGLE OR MULTIPLE;  Gastroscopy;  Surgeon: Yonny Zambrano MD;  Location: [facility]     NO HISTORY OF SURGERY  2007       Family History:    Family History   Problem Relation Age of Onset     Hypertension Mother      Diabetes Mother      C.A.D. Maternal Grandmother      Hypertension Maternal Grandmother      Cerebrovascular Disease Maternal Grandmother      Cancer Paternal Grandfather         ? type     Diabetes Paternal Grandfather         Adult onset     Cancer Maternal Grandfather         ? renal     Diabetes Father      Hypertension Father      Cancer - colorectal Father      Coronary Artery Disease Father      Respiratory Daughter         asthma     Heart Disease Brother         double bypass     C.A.D. Brother      Cancer Brother         testicular ca     Hypertension Brother      Myocardial Infarction Brother      Breast Cancer No family hx of        Social History:  Marital Status:   [4]  Social History     Tobacco Use     Smoking status: Former Smoker     Packs/day: 0.25     Years: 25.00     Pack years: 6.25     Types: Cigarettes     Last attempt to quit: 3/14/2016     Years since quittin.8     Smokeless tobacco: Never Used   Substance Use Topics     Alcohol use: No     Comment: 1 beer per month, usually none     Drug use: No        Medications:      albuterol (2.5 MG/3ML) 0.083% neb solution   albuterol (PROAIR HFA/PROVENTIL HFA/VENTOLIN HFA) 108 (90 Base) MCG/ACT Inhaler   azithromycin (ZITHROMAX Z-SAM) 250 MG tablet   beclomethasone HFA (QVAR REDIHALER) 80 MCG/ACT AERB inhaler   ipratropium - albuterol 0.5 mg/2.5 mg/3 mL (DUONEB) 0.5-2.5 (3) MG/3ML neb solution   levothyroxine (SYNTHROID/LEVOTHROID) 25 MCG tablet   lisinopril (PRINIVIL/ZESTRIL)  10 MG tablet   loratadine (CLARITIN) 10 MG tablet   omeprazole (PRILOSEC) 40 MG capsule   predniSONE (DELTASONE) 20 MG tablet         Review of Systems   Constitutional: Negative for chills, diaphoresis and fever.   HENT: Positive for sore throat. Negative for ear pain and sinus pressure.    Eyes: Negative for visual disturbance.   Respiratory: Positive for cough, shortness of breath and wheezing.    Cardiovascular: Negative for chest pain and palpitations.   Gastrointestinal: Negative for abdominal pain, blood in stool, constipation, diarrhea, nausea and vomiting.   Genitourinary: Negative for dysuria, frequency and urgency.   Skin: Negative for rash.   Neurological: Negative for headaches.   All other systems reviewed and are negative.      Physical Exam          Physical Exam   Constitutional: No distress.   HENT:   Nose: Nose normal.   Eyes: Conjunctivae are normal.   Neck: Neck supple.   Cardiovascular: Normal rate, regular rhythm and normal heart sounds. Exam reveals no gallop and no friction rub.   No murmur heard.  Pulmonary/Chest: Effort normal. No stridor. No respiratory distress. She has no wheezes. She has rhonchi.   Abdominal: Soft. Normal appearance and bowel sounds are normal. There is no tenderness.   Skin: She is not diaphoretic.       ED Course        Procedures               EKG Interpretation:      Interpreted by Alex Lord  Time reviewed: 0700  Symptoms at time of EKG: dyspnea   Rhythm: normal sinus   Rate: normal  Axis: normal  Ectopy: none  Conduction: normal  ST Segments/ T Waves: No ST-T wave changes  Q Waves: none  Comparison to prior: No old EKG available    Clinical Impression: normal EKG          Critical Care time:  none               Results for orders placed or performed during the hospital encounter of 01/24/19 (from the past 24 hour(s))   Troponin I   Result Value Ref Range    Troponin I ES <0.015 0.000 - 0.045 ug/L   Nt probnp inpatient (BNP)   Result Value Ref Range     N-Terminal Pro BNP Inpatient 91 0 - 900 pg/mL   D dimer quantitative   Result Value Ref Range    D Dimer 0.3 0.0 - 0.50 ug/ml FEU   CBC with platelets differential   Result Value Ref Range    WBC 9.6 4.0 - 11.0 10e9/L    RBC Count 4.66 3.8 - 5.2 10e12/L    Hemoglobin 14.6 11.7 - 15.7 g/dL    Hematocrit 44.8 35.0 - 47.0 %    MCV 96 78 - 100 fl    MCH 31.3 26.5 - 33.0 pg    MCHC 32.6 31.5 - 36.5 g/dL    RDW 12.8 10.0 - 15.0 %    Platelet Count 216 150 - 450 10e9/L    Diff Method Automated Method     % Neutrophils 65.6 %    % Lymphocytes 22.0 %    % Monocytes 11.0 %    % Eosinophils 0.5 %    % Basophils 0.5 %    % Immature Granulocytes 0.4 %    Nucleated RBCs 0 0 /100    Absolute Neutrophil 6.3 1.6 - 8.3 10e9/L    Absolute Lymphocytes 2.1 0.8 - 5.3 10e9/L    Absolute Monocytes 1.1 0.0 - 1.3 10e9/L    Absolute Eosinophils 0.1 0.0 - 0.7 10e9/L    Absolute Basophils 0.1 0.0 - 0.2 10e9/L    Abs Immature Granulocytes 0.0 0 - 0.4 10e9/L    Absolute Nucleated RBC 0.0    Basic metabolic panel   Result Value Ref Range    Sodium 141 133 - 144 mmol/L    Potassium 3.4 3.4 - 5.3 mmol/L    Chloride 109 94 - 109 mmol/L    Carbon Dioxide 23 20 - 32 mmol/L    Anion Gap 9 3 - 14 mmol/L    Glucose 123 (H) 70 - 99 mg/dL    Urea Nitrogen 10 7 - 30 mg/dL    Creatinine 0.66 0.52 - 1.04 mg/dL    GFR Estimate >90 >60 mL/min/[1.73_m2]    GFR Estimate If Black >90 >60 mL/min/[1.73_m2]    Calcium 8.4 (L) 8.5 - 10.1 mg/dL       Medications - No data to display    Assessments & Plan (with Medical Decision Making)     MDM: Rose Mary Rose is a 53 year old female who presented with dyspnea on exertion following an influenza-like illness and mild persistent asthma exacerbation but refractory to prednisone and albuterol with dyspnea even on basic ADLs at home.  Tobacco abuse but no other cardiopulmonary history or risks.  Since the dyspnea was out of proportion to her exam or her history of broader based evaluation was performed and EKG, BNP,  d-dimer, troponin, CBC and electrolytes were all reassuring.  She had some relief here with nebulizer treatment.  I do not suspect other causes at this point and do believe this is all associated with her acute respiratory illness and asthma exacerbation.  I have asked her to follow-up with her primary provider as her steroid completes and we discussed tobacco cessation.  Precautions are given for return.  Additional recommendations are as below.  I have reviewed the nursing notes.    I have reviewed the findings, diagnosis, plan and need for follow up with the patient.          Medication List      There are no discharge medications for this visit.         Final diagnoses:   Mild persistent asthma with exacerbation   Influenza-like illness - this is likely the cause of the worsening asthma. complete the course of steroids and antibiotics. return for fever, worsening   Dyspnea on exertion - testing for other causes of shortness of breath were reassuring.  quit tobacco.       1/24/2019   [facility] EMERGENCY DEPARTMENT     Alex Lord MD  01/24/19 9032

## 2019-01-24 NOTE — ED AVS SNAPSHOT
[facility] Emergency Department  [address] [address]  Phone:  [phone]  Fax:  [phone]                                    Rose Mary Rose   MRN: [id number]    Department:  [facility] Emergency Department   Date of Visit:  1/24/2019           After Visit Summary Signature Page    I have received my discharge instructions, and my questions have been answered. I have discussed any challenges I see with this plan with the nurse or doctor.    ..........................................................................................................................................  Patient/Patient Representative Signature      ..........................................................................................................................................  Patient Representative Print Name and Relationship to Patient    ..................................................               ................................................  Date                                   Time    ..........................................................................................................................................  Reviewed by Signature/Title    ...................................................              ..............................................  Date                                               Time          22EPIC Rev 08/18

## 2019-01-24 NOTE — DISCHARGE INSTRUCTIONS
ICD-10-CM    1. Mild persistent asthma with exacerbation J45.31    2. Influenza-like illness R69     this is likely the cause of the worsening asthma. complete the course of steroids and antibiotics. return for fever, worsening   3. Dyspnea on exertion R06.09     testing for other causes of shortness of breath were reassuring.  quit tobacco.

## 2019-01-24 NOTE — LETTER
January 24, 2019      To Whom It May Concern:      Rose Mary Rose was seen in our Emergency Department today, 01/24/19.  I expect her condition to improve over the next 2 days.  She may return to work/school when improved.    Sincerely,        Alex Lord MD

## 2019-01-24 NOTE — ED TRIAGE NOTES
"Patient states that she was here two days ago for symptoms of asthma. States today just feels worse. Can not describe symptoms just states \"I just don't feel right, I feel weird.\" Patient states \" maybe I am short of breath\" States she has had a cough for last few days denies fever or any other symptoms just states increased shortness of breath with exertion. \"Walking across parking lot into work I could barely breath by the time I got inside.\"   "

## 2019-01-28 ENCOUNTER — OFFICE VISIT (OUTPATIENT)
Dept: FAMILY MEDICINE | Facility: CLINIC | Age: 54
End: 2019-01-28
Payer: COMMERCIAL

## 2019-01-28 VITALS
HEIGHT: 62 IN | TEMPERATURE: 98.8 F | WEIGHT: 169 LBS | OXYGEN SATURATION: 94 % | RESPIRATION RATE: 20 BRPM | SYSTOLIC BLOOD PRESSURE: 136 MMHG | HEART RATE: 87 BPM | BODY MASS INDEX: 31.1 KG/M2 | DIASTOLIC BLOOD PRESSURE: 72 MMHG

## 2019-01-28 DIAGNOSIS — E03.9 ACQUIRED HYPOTHYROIDISM: ICD-10-CM

## 2019-01-28 DIAGNOSIS — J45.41 MODERATE PERSISTENT ASTHMA WITH ACUTE EXACERBATION: Primary | ICD-10-CM

## 2019-01-28 LAB — TSH SERPL DL<=0.005 MIU/L-ACNC: 2.76 MU/L (ref 0.4–4)

## 2019-01-28 PROCEDURE — 36415 COLL VENOUS BLD VENIPUNCTURE: CPT | Performed by: NURSE PRACTITIONER

## 2019-01-28 PROCEDURE — 84443 ASSAY THYROID STIM HORMONE: CPT | Performed by: NURSE PRACTITIONER

## 2019-01-28 PROCEDURE — 99214 OFFICE O/P EST MOD 30 MIN: CPT | Performed by: NURSE PRACTITIONER

## 2019-01-28 RX ORDER — LEVOTHYROXINE SODIUM 25 UG/1
25 TABLET ORAL DAILY
Qty: 90 TABLET | Refills: 3 | Status: SHIPPED | OUTPATIENT
Start: 2019-01-28 | End: 2019-02-28

## 2019-01-28 RX ORDER — PREDNISONE 20 MG/1
TABLET ORAL
Qty: 10 TABLET | Refills: 0 | Status: SHIPPED | OUTPATIENT
Start: 2019-01-28 | End: 2019-02-07

## 2019-01-28 ASSESSMENT — PAIN SCALES - GENERAL: PAINLEVEL: NO PAIN (0)

## 2019-01-28 ASSESSMENT — MIFFLIN-ST. JEOR: SCORE: 1306.89

## 2019-01-28 NOTE — LETTER
[facility]  [address] [address]  [phone]          January 28, 2019    RE:  Rose Mary Rose                                                                                                                                                       [address] [address]            To whom it may concern:    Rose Mary Rose was seen in my clinic today for an asthma exacerbation. 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        Sincerely,          Soledad Bauman, CNP

## 2019-01-28 NOTE — PROGRESS NOTES
"  SUBJECTIVE:   Rose Mary Rose is a 53 year old female who presents to clinic today for the following health issues:      ED/UC Followup:    Facility:  [facility]  Date of visit: 1/22/19 & 1/24/19  Reason for visit: wheezing, asthma, body aches  Current Status: still wheezing  Getting better  Thinks she got a flu-like bug from work.  Completed the antibiotics given in the ER  Currently on one tablet (20mg) of Prednisone.  No fevers or chills.  No coughing.  Still some wheezing and shortness of breath - although better than when she went to ER.  Not using QVAR currently - states that when she uses it, she feels like it triggers asthma symptoms or irritation and she needs to neb after using it. Flovent worked best but isn't covered by her insurance.         Hypothyroidism Follow-up      Since last visit, patient describes the following symptoms: Weight stable, no hair loss, no skin changes, no constipation, no loose stools      Problem list and histories reviewed & adjusted, as indicated.  Additional history: as documented      Reviewed and updated as needed this visit by clinical staff       Reviewed and updated as needed this visit by Provider         ROS:  Constitutional, HEENT, cardiovascular, pulmonary, gi and gu systems are negative, except as otherwise noted.    OBJECTIVE:     /72 (BP Location: Right arm, Patient Position: Chair, Cuff Size: Adult Regular)   Pulse 87   Temp 98.8  F (37.1  C) (Tympanic)   Resp 20   Ht 1.562 m (5' 1.5\")   Wt 76.7 kg (169 lb)   LMP 04/06/2016   SpO2 94%   BMI 31.42 kg/m    Body mass index is 31.42 kg/m .  GENERAL: healthy, alert and no distress  RESP: lungs clear to auscultation - no rales, rhonchi or wheezes  CV: regular rate and rhythm, normal S1 S2, no S3 or S4, no murmur, click or rub, no peripheral edema and peripheral pulses strong      ASSESSMENT/PLAN:       ICD-10-CM    1. Moderate persistent asthma with acute exacerbation J45.41 Improving, but still having " symptoms.  Will extend course of prednisone: 20 mg qday x5 days, then 10 mg daily x5 days.    Advised to monitor reaction to QVAR when she has recovered from this exacerbation. If the QVAR is triggering asthma symptoms, will proceed with a prior auth for the Flovent.    predniSONE (DELTASONE) 20 MG tablet     2. Acquired hypothyroidism E03.9 Due for recheck today:  levothyroxine (SYNTHROID/LEVOTHROID) 25 MCG tablet     TSH         The risks, benefits and treatment options of prescribed medications or other treatments have been discussed with the patient. The patient verbalized their understanding and should call or follow up if no improvement or if they develop further problems.    REDD [facility]

## 2019-01-28 NOTE — NURSING NOTE
"Initial /72 (BP Location: Right arm, Patient Position: Chair, Cuff Size: Adult Regular)   Pulse 87   Temp 98.8  F (37.1  C) (Tympanic)   Resp 20   Ht 1.562 m (5' 1.5\")   Wt 76.7 kg (169 lb)   LMP 04/06/2016   SpO2 94%   BMI 31.42 kg/m   Estimated body mass index is 31.42 kg/m  as calculated from the following:    Height as of this encounter: 1.562 m (5' 1.5\").    Weight as of this encounter: 76.7 kg (169 lb). .      Gayla Amaya, JAVIER    "

## 2019-02-28 DIAGNOSIS — E03.9 ACQUIRED HYPOTHYROIDISM: ICD-10-CM

## 2019-02-28 RX ORDER — LEVOTHYROXINE SODIUM 25 UG/1
TABLET ORAL
Qty: 90 TABLET | Refills: 2 | Status: SHIPPED | OUTPATIENT
Start: 2019-02-28 | End: 2019-06-03

## 2019-02-28 NOTE — TELEPHONE ENCOUNTER
"Pharmacy transfer request.    Requested Prescriptions   Pending Prescriptions Disp Refills     levothyroxine (SYNTHROID/LEVOTHROID) 25 MCG tablet [Pharmacy Med Name: Levothyroxine Sodium 25 MCG Tablet] 90 tablet 3     Sig: TAKE 1 TABLET BY MOUTH DAILY    Thyroid Protocol Passed - 2/28/2019 12:06 PM       Passed - Patient is 12 years or older       Passed - Recent (12 mo) or future (30 days) visit within the authorizing provider's specialty    Patient had office visit in the last 12 months or has a visit in the next 30 days with authorizing provider or within the authorizing provider's specialty.  See \"Patient Info\" tab in inbasket, or \"Choose Columns\" in Meds & Orders section of the refill encounter.             Passed - Medication is active on med list       Passed - Normal TSH on file in past 12 months    Recent Labs   Lab Test 01/28/19  0758   TSH 2.76             Passed - No active pregnancy on record    If patient is pregnant or has had a positive pregnancy test, please check TSH.         Passed - No positive pregnancy test in past 12 months    If patient is pregnant or has had a positive pregnancy test, please check TSH.            Last Written Prescription Date:  1/28/19  Last Fill Quantity: 90,  # refills: 3   Last office visit: 1/28/2019 with prescribing provider:  Merna   Future Office Visit:      Malini SHAH RN      "

## 2019-04-26 ENCOUNTER — OFFICE VISIT (OUTPATIENT)
Dept: FAMILY MEDICINE | Facility: CLINIC | Age: 54
End: 2019-04-26
Payer: COMMERCIAL

## 2019-04-26 VITALS
DIASTOLIC BLOOD PRESSURE: 70 MMHG | BODY MASS INDEX: 30.55 KG/M2 | HEIGHT: 62 IN | SYSTOLIC BLOOD PRESSURE: 132 MMHG | WEIGHT: 166 LBS | HEART RATE: 78 BPM | RESPIRATION RATE: 20 BRPM | TEMPERATURE: 98.5 F | OXYGEN SATURATION: 98 %

## 2019-04-26 DIAGNOSIS — R07.0 THROAT PAIN: ICD-10-CM

## 2019-04-26 DIAGNOSIS — J06.9 VIRAL URI: Primary | ICD-10-CM

## 2019-04-26 LAB
DEPRECATED S PYO AG THROAT QL EIA: NORMAL
SPECIMEN SOURCE: NORMAL

## 2019-04-26 PROCEDURE — 87880 STREP A ASSAY W/OPTIC: CPT | Performed by: NURSE PRACTITIONER

## 2019-04-26 PROCEDURE — 87081 CULTURE SCREEN ONLY: CPT | Performed by: NURSE PRACTITIONER

## 2019-04-26 PROCEDURE — 99213 OFFICE O/P EST LOW 20 MIN: CPT | Performed by: NURSE PRACTITIONER

## 2019-04-26 ASSESSMENT — ASTHMA QUESTIONNAIRES
QUESTION_2 LAST FOUR WEEKS HOW OFTEN HAVE YOU HAD SHORTNESS OF BREATH: THREE TO SIX TIMES A WEEK
QUESTION_1 LAST FOUR WEEKS HOW MUCH OF THE TIME DID YOUR ASTHMA KEEP YOU FROM GETTING AS MUCH DONE AT WORK, SCHOOL OR AT HOME: NONE OF THE TIME
QUESTION_4 LAST FOUR WEEKS HOW OFTEN HAVE YOU USED YOUR RESCUE INHALER OR NEBULIZER MEDICATION (SUCH AS ALBUTEROL): ONE OR TWO TIMES PER DAY
ACT_TOTALSCORE: 15
EMERGENCY_ROOM_LAST_YEAR_TOTAL: TWO
QUESTION_5 LAST FOUR WEEKS HOW WOULD YOU RATE YOUR ASTHMA CONTROL: SOMEWHAT CONTROLLED
QUESTION_3 LAST FOUR WEEKS HOW OFTEN DID YOUR ASTHMA SYMPTOMS (WHEEZING, COUGHING, SHORTNESS OF BREATH, CHEST TIGHTNESS OR PAIN) WAKE YOU UP AT NIGHT OR EARLIER THAN USUAL IN THE MORNING: TWO OR THREE NIGHTS A WEEK

## 2019-04-26 ASSESSMENT — MIFFLIN-ST. JEOR: SCORE: 1288.28

## 2019-04-26 NOTE — PATIENT INSTRUCTIONS
Thank you for choosing [facility].  You may be receiving an email and/or telephone survey request from [facility] [employer] regarding your visit today.  Please take a few minutes to respond to the survey to let us know how we are doing.      If you have questions or concerns, please contact us via [employer] or you can contact your care team at [phone].    Our Clinic hours are:  Monday 6:40 am  to 7:00 pm  Tuesday -Friday 6:40 am to 5:00 pm    The [place] outpatient lab hours are:  Monday - Friday 6:10 am to 4:45 pm  Saturdays 7:00 am to 11:00 am  Appointments are required, call [phone]    If you have clinical questions after hours or would like to schedule an appointment,  call the clinic at [phone].

## 2019-04-26 NOTE — LETTER
My Asthma Action Plan  Name: Rose Mary Rose   YOB: 1965  Date: 4/26/2019   My doctor: REDD Tompkins CNP   [place] [facility] [place]        My Control Medicine: { :804562}  My Rescue Medicine: { :411872}  {AAP include Oral Steroid:283375} My Asthma Severity: { :292492}  Avoid your asthma triggers: { :073347}  None     {Is patient a child or adult?:459375}       GREEN ZONE   Good Control    I feel good    No cough or wheeze    Can work, sleep and play without asthma symptoms       Take your asthma control medicine every day.     1. If exercise triggers your asthma, take your rescue medication    15 minutes before exercise or sports, and    During exercise if you have asthma symptoms  2. Spacer to use with inhaler: If you have a spacer, make sure to use it with your inhaler             YELLOW ZONE Getting Worse  I have ANY of these:    I do not feel good    Cough or wheeze    Chest feels tight    Wake up at night   1. Keep taking your Green Zone medications  2. Start taking your rescue medicine:    every 20 minutes for up to 1 hour. Then every 4 hours for 24-48 hours.  3. If you stay in the Yellow Zone for more than 12-24 hours, contact your doctor.  4. If you do not return to the Green Zone in 12-24 hours or you get worse, start taking your oral steroid medicine if prescribed by your provider.           RED ZONE Medical Alert - Get Help  I have ANY of these:    I feel awful    Medicine is not helping    Breathing getting harder    Trouble walking or talking    Nose opens wide to breathe       1. Take your rescue medicine NOW  2. If your provider has prescribed an oral steroid medicine, start taking it NOW  3. Call your doctor NOW  4. If you are still in the Red Zone after 20 minutes and you have not reached your doctor:    Take your rescue medicine again and    Call 911 or go to the emergency room right away    See your regular doctor within 2 weeks of an  Emergency Room or Urgent Care visit for follow-up treatment.          Annual Reminders:  Meet with Asthma Educator,  Flu Shot in the Fall, consider Pneumonia Vaccination for patients with asthma (aged 19 and older).    Pharmacy:    [id number] [place] [facility] [place], [place] - [address] [facility] [place] [address] [facility] [place] [address] [facility]  [facility]) [facility] [place] - [address] [facility] [address] [facility]  [facility] [place], [place] - [address]                      Asthma Triggers  How To Control Things That Make Your Asthma Worse    Triggers are things that make your asthma worse.  Look at the list below to help you find your triggers and what you can do about them.  You can help prevent asthma flare-ups by staying away from your triggers.      Trigger                                                          What you can do   Cigarette Smoke  Tobacco smoke can make asthma worse. Do not allow smoking in your home, car or around you.  Be sure no one smokes at a child s day care or school.  If you smoke, ask your health care provider for ways to help you quit.  Ask family members to quit too.  Ask your health care provider for a referral to Quit Plan to help you quit smoking, or call 6-617-780-PLAN.     Colds, Flu, Bronchitis  These are common triggers of asthma. Wash your hands often.  Don t touch your eyes, nose or mouth.  Get a flu shot every year.     Dust Mites  These are tiny bugs that live in cloth or carpet. They are too small to see. Wash sheets and blankets in hot water every week.   Encase pillows and mattress in dust mite proof covers.  Avoid having carpet if you can. If you have carpet, vacuum weekly.   Use a dust mask and HEPA vacuum.   Pollen and Outdoor Mold  Some people are allergic to trees, grass, or weed pollen, or molds. Try to keep your windows closed.  Limit time out doors when pollen count is high.    Ask you health care provider about taking medicine during allergy season.     Animal Dander  Some people are allergic to skin flakes, urine or saliva from pets with fur or feathers. Keep pets with fur or feathers out of your home.    If you can t keep the pet outdoors, then keep the pet out of your bedroom.  Keep the bedroom door closed.  Keep pets off cloth furniture and away from stuffed toys.     Mice, Rats, and Cockroaches  Some people are allergic to the waste from these pests.   Cover food and garbage.  Clean up spills and food crumbs.  Store grease in the refrigerator.   Keep food out of the bedroom.   Indoor Mold  This can be a trigger if your home has high moisture. Fix leaking faucets, pipes, or other sources of water.   Clean moldy surfaces.  Dehumidify basement if it is damp and smelly.   Smoke, Strong Odors, and Sprays  These can reduce air quality. Stay away from strong odors and sprays, such as perfume, powder, hair spray, paints, smoke incense, paint, cleaning products, candles and new carpet.   Exercise or Sports  Some people with asthma have this trigger. Be active!  Ask your doctor about taking medicine before sports or exercise to prevent symptoms.    Warm up for 5-10 minutes before and after sports or exercise.     Other Triggers of Asthma  Cold air:  Cover your nose and mouth with a scarf.  Sometimes laughing or crying can be a trigger.  Some medicines and food can trigger asthma.

## 2019-04-26 NOTE — PROGRESS NOTES
"  SUBJECTIVE:   Rose Mary Rose is a 54 year old female who presents to clinic today for the following   health issues:      ENT Symptoms             Symptoms: cc Present Absent Comment   Fever/Chills   x    Fatigue  x     Muscle Aches   x    Eye Irritation       Sneezing       Nasal Kemal/Drg  x  Drainage down throat   Sinus Pressure/Pain   x    Loss of smell   x    Dental pain       Sore Throat x x     Swollen Glands       Ear Pain/Fullness  x  Left ear   Cough  x  Some times productive   Wheeze  x  little   Chest Pain   x    Shortness of breath  x     Rash       Other  x  Hoarsenes;  Dull headache     Symptom duration:  4 dyas   Symptom severity:  moderate   Treatments tried:  regular asthma medications/  dayquil   Contacts:  coworkers-            Additional history: as documented    Reviewed  and updated as needed this visit by clinical staff  Allergies  Meds         Reviewed and updated as needed this visit by Provider           ROS:  Constitutional, HEENT, cardiovascular, pulmonary, gi and gu systems are negative, except as otherwise noted.    OBJECTIVE:     /70 (BP Location: Right arm)   Pulse 78   Temp 98.5  F (36.9  C) (Tympanic)   Resp 20   Ht 1.562 m (5' 1.5\")   Wt 75.3 kg (166 lb)   LMP 04/06/2016   SpO2 98%   BMI 30.86 kg/m    Body mass index is 30.86 kg/m .  GENERAL: healthy, alert and no distress  HENT: ear canals and TM's normal, nose and mouth without ulcers or lesions  NECK: no adenopathy, no asymmetry, masses, or scars and thyroid normal to palpation  RESP: lungs clear to auscultation - no rales, rhonchi or wheezes  CV: regular rate and rhythm, normal S1 S2, no S3 or S4, no murmur, click or rub, no peripheral edema and peripheral pulses strong    Diagnostic Test Results:  Results for orders placed or performed in visit on 04/26/19 (from the past 24 hour(s))   Strep, Rapid Screen   Result Value Ref Range    Specimen Description Throat     Rapid Strep A Screen       NEGATIVE: No " Group A streptococcal antigen detected by immunoassay, await culture report.       ASSESSMENT/PLAN:       ICD-10-CM    1. Viral URI J06.9    2. Throat pain R07.0 Strep, Rapid Screen     Beta strep group A culture     1. Drink plenty of fluids.  2. May use tylenol 1000 mg every 8 hours or ibuprofen 600 mg every 6 hours for any discomfort you are having.  3. Use Neti pot or nasal saline if you are having nasal or sinus congestion  4. For cough, dextromethorphan/guaifenesin combinations help loosen secretions and suppress cough safely without significant risk of sedation.   5. For nasal congestion and sinus pressure, pseudoephedrine (Sudafed) or phenylephrine is often helpful but it can cause elevations in blood pressure and insomnia.  Use Coricidin as a decongestant if you have a history of hypertension.  6. For runny nose and nasal congestion, use over-the-counter oxymetazoline (i.e. Afrin - use 2 sprays of 0.05% in each nostril every 12 hours; stop after 3-5 days)   7. Suggest humidifier in room at night  8. Call clinic if no improvement in 1 week        The risks, benefits and treatment options of prescribed medications or other treatments have been discussed with the patient. The patient verbalized their understanding and should call or follow up if no improvement or if they develop further problems.    REDD Tompkins [facility] [place]

## 2019-04-27 LAB
BACTERIA SPEC CULT: NORMAL
SPECIMEN SOURCE: NORMAL

## 2019-04-27 ASSESSMENT — ASTHMA QUESTIONNAIRES: ACT_TOTALSCORE: 15

## 2019-05-30 DIAGNOSIS — J45.40 MODERATE PERSISTENT ASTHMA WITHOUT COMPLICATION: ICD-10-CM

## 2019-05-30 NOTE — TELEPHONE ENCOUNTER
"Requested Prescriptions   Pending Prescriptions Disp Refills     loratadine (CLARITIN) 10 MG tablet [Pharmacy Med Name: Loratadine 10 MG Tablet] 90 tablet 3     Sig: TAKE 1 TABLET BY MOUTH DAILY       Antihistamines Protocol Passed - 5/30/2019  2:05 PM        Passed - Patient is 3-64 years of age     Apply weight-based dosing for peds patients age 3 - 12 years of age.    Forward request to provider for patients under the age of 3 or over the age of 64.          Passed - Recent (12 mo) or future (30 days) visit within the authorizing provider's specialty     Patient had office visit in the last 12 months or has a visit in the next 30 days with authorizing provider or within the authorizing provider's specialty.  See \"Patient Info\" tab in inbasket, or \"Choose Columns\" in Meds & Orders section of the refill encounter.              Passed - Medication is active on med list        Last Written Prescription Date:  6/5/18  Last Fill Quantity: 90,  # refills: 3   Last office visit: 4/26/2019 with prescribing provider:  Merna   Future Office Visit:   Next 5 appointments (look out 90 days)    Jun 03, 2019  8:00 AM CDT  PHYSICAL with REDD Eubanks CNP  [facility] ([facility]) [address] [place] [address] [phone]           "

## 2019-05-31 ENCOUNTER — HOSPITAL ENCOUNTER (EMERGENCY)
Facility: CLINIC | Age: 54
End: 2019-05-31
Payer: COMMERCIAL

## 2019-05-31 RX ORDER — LORATADINE 10 MG/1
TABLET ORAL
Qty: 90 TABLET | Refills: 3 | Status: SHIPPED | OUTPATIENT
Start: 2019-05-31 | End: 2020-05-26

## 2019-06-03 ENCOUNTER — OFFICE VISIT (OUTPATIENT)
Dept: FAMILY MEDICINE | Facility: CLINIC | Age: 54
End: 2019-06-03
Payer: COMMERCIAL

## 2019-06-03 VITALS
RESPIRATION RATE: 16 BRPM | HEIGHT: 62 IN | HEART RATE: 60 BPM | DIASTOLIC BLOOD PRESSURE: 70 MMHG | WEIGHT: 161 LBS | BODY MASS INDEX: 29.63 KG/M2 | OXYGEN SATURATION: 96 % | TEMPERATURE: 97.7 F | SYSTOLIC BLOOD PRESSURE: 116 MMHG

## 2019-06-03 DIAGNOSIS — I10 ESSENTIAL HYPERTENSION WITH GOAL BLOOD PRESSURE LESS THAN 140/90: ICD-10-CM

## 2019-06-03 DIAGNOSIS — J45.40 MODERATE PERSISTENT ASTHMA WITHOUT COMPLICATION: ICD-10-CM

## 2019-06-03 DIAGNOSIS — E03.9 ACQUIRED HYPOTHYROIDISM: ICD-10-CM

## 2019-06-03 DIAGNOSIS — Z00.00 ROUTINE GENERAL MEDICAL EXAMINATION AT A HEALTH CARE FACILITY: Primary | ICD-10-CM

## 2019-06-03 DIAGNOSIS — Z23 ENCOUNTER FOR VACCINATION: ICD-10-CM

## 2019-06-03 DIAGNOSIS — K21.9 GASTROESOPHAGEAL REFLUX DISEASE WITHOUT ESOPHAGITIS: ICD-10-CM

## 2019-06-03 LAB
ANION GAP SERPL CALCULATED.3IONS-SCNC: 7 MMOL/L (ref 3–14)
BUN SERPL-MCNC: 9 MG/DL (ref 7–30)
CALCIUM SERPL-MCNC: 9.2 MG/DL (ref 8.5–10.1)
CHLORIDE SERPL-SCNC: 109 MMOL/L (ref 94–109)
CHOLEST SERPL-MCNC: 203 MG/DL
CO2 SERPL-SCNC: 25 MMOL/L (ref 20–32)
CREAT SERPL-MCNC: 0.67 MG/DL (ref 0.52–1.04)
GFR SERPL CREATININE-BSD FRML MDRD: >90 ML/MIN/{1.73_M2}
GLUCOSE SERPL-MCNC: 105 MG/DL (ref 70–99)
HDLC SERPL-MCNC: 69 MG/DL
LDLC SERPL CALC-MCNC: 103 MG/DL
NONHDLC SERPL-MCNC: 134 MG/DL
POTASSIUM SERPL-SCNC: 3.8 MMOL/L (ref 3.4–5.3)
SODIUM SERPL-SCNC: 141 MMOL/L (ref 133–144)
TRIGL SERPL-MCNC: 156 MG/DL
TSH SERPL DL<=0.005 MIU/L-ACNC: 1.48 MU/L (ref 0.4–4)

## 2019-06-03 PROCEDURE — 99396 PREV VISIT EST AGE 40-64: CPT | Mod: 25 | Performed by: NURSE PRACTITIONER

## 2019-06-03 PROCEDURE — 90714 TD VACC NO PRESV 7 YRS+ IM: CPT | Performed by: NURSE PRACTITIONER

## 2019-06-03 PROCEDURE — 80048 BASIC METABOLIC PNL TOTAL CA: CPT | Performed by: NURSE PRACTITIONER

## 2019-06-03 PROCEDURE — 90471 IMMUNIZATION ADMIN: CPT | Performed by: NURSE PRACTITIONER

## 2019-06-03 PROCEDURE — 36415 COLL VENOUS BLD VENIPUNCTURE: CPT | Performed by: NURSE PRACTITIONER

## 2019-06-03 PROCEDURE — 80061 LIPID PANEL: CPT | Performed by: NURSE PRACTITIONER

## 2019-06-03 PROCEDURE — 84443 ASSAY THYROID STIM HORMONE: CPT | Performed by: NURSE PRACTITIONER

## 2019-06-03 RX ORDER — ALBUTEROL SULFATE 0.83 MG/ML
2.5 SOLUTION RESPIRATORY (INHALATION) EVERY 4 HOURS PRN
Qty: 3 BOX | Refills: 3 | Status: SHIPPED | OUTPATIENT
Start: 2019-06-03 | End: 2020-01-20

## 2019-06-03 RX ORDER — IPRATROPIUM BROMIDE AND ALBUTEROL SULFATE 2.5; .5 MG/3ML; MG/3ML
1 SOLUTION RESPIRATORY (INHALATION) EVERY 6 HOURS PRN
Qty: 1 BOX | Refills: 3 | Status: SHIPPED | OUTPATIENT
Start: 2019-06-03 | End: 2020-01-20

## 2019-06-03 RX ORDER — OMEPRAZOLE 40 MG/1
40 CAPSULE, DELAYED RELEASE ORAL DAILY
Qty: 30 CAPSULE | Refills: 11 | Status: CANCELLED | OUTPATIENT
Start: 2019-06-03

## 2019-06-03 RX ORDER — ALBUTEROL SULFATE 90 UG/1
2 AEROSOL, METERED RESPIRATORY (INHALATION) EVERY 6 HOURS PRN
Qty: 18 G | Refills: 11 | Status: SHIPPED | OUTPATIENT
Start: 2019-06-03 | End: 2020-01-17

## 2019-06-03 RX ORDER — LISINOPRIL 10 MG/1
10 TABLET ORAL DAILY
Qty: 90 TABLET | Refills: 3 | Status: SHIPPED | OUTPATIENT
Start: 2019-06-03 | End: 2020-06-08

## 2019-06-03 RX ORDER — LEVOTHYROXINE SODIUM 25 UG/1
25 TABLET ORAL DAILY
Qty: 90 TABLET | Refills: 3 | Status: SHIPPED | OUTPATIENT
Start: 2019-06-03 | End: 2020-05-26

## 2019-06-03 ASSESSMENT — MIFFLIN-ST. JEOR: SCORE: 1265.6

## 2019-06-03 NOTE — LETTER
My Asthma Action Plan  Name: Rose Mary Rose   YOB: 1965  Date: 6/3/2019   My doctor: REDD Eubanks CNP   [place] [facility] [place]        My Control Medicine: { :006444}  My Rescue Medicine: { :393298}  {AAP include Oral Steroid:738742} My Asthma Severity: { :017464}  Avoid your asthma triggers: { :248425}  humidity  Patient is unaware of triggers     {Is patient a child or adult?:398958}       GREEN ZONE   Good Control    I feel good    No cough or wheeze    Can work, sleep and play without asthma symptoms       Take your asthma control medicine every day.     1. If exercise triggers your asthma, take your rescue medication    15 minutes before exercise or sports, and    During exercise if you have asthma symptoms  2. Spacer to use with inhaler: If you have a spacer, make sure to use it with your inhaler             YELLOW ZONE Getting Worse  I have ANY of these:    I do not feel good    Cough or wheeze    Chest feels tight    Wake up at night   1. Keep taking your Green Zone medications  2. Start taking your rescue medicine:    every 20 minutes for up to 1 hour. Then every 4 hours for 24-48 hours.  3. If you stay in the Yellow Zone for more than 12-24 hours, contact your doctor.  4. If you do not return to the Green Zone in 12-24 hours or you get worse, start taking your oral steroid medicine if prescribed by your provider.           RED ZONE Medical Alert - Get Help  I have ANY of these:    I feel awful    Medicine is not helping    Breathing getting harder    Trouble walking or talking    Nose opens wide to breathe       1. Take your rescue medicine NOW  2. If your provider has prescribed an oral steroid medicine, start taking it NOW  3. Call your doctor NOW  4. If you are still in the Red Zone after 20 minutes and you have not reached your doctor:    Take your rescue medicine again and    Call 911 or go to the emergency room right away    See your  regular doctor within 2 weeks of an Emergency Room or Urgent Care visit for follow-up treatment.          Annual Reminders:  Meet with Asthma Educator,  Flu Shot in the Fall, consider Pneumonia Vaccination for patients with asthma (aged 19 and older).    Pharmacy:    [id number] - [place], [place] - [address] [facility] [place] [address] [facility] [place] - [address] [facility]  [facility]) [facility] [place] - [address] [address]  [facility] - [place], [place] - [address]                      Asthma Triggers  How To Control Things That Make Your Asthma Worse    Triggers are things that make your asthma worse.  Look at the list below to help you find your triggers and what you can do about them.  You can help prevent asthma flare-ups by staying away from your triggers.      Trigger                                                          What you can do   Cigarette Smoke  Tobacco smoke can make asthma worse. Do not allow smoking in your home, car or around you.  Be sure no one smokes at a child s day care or school.  If you smoke, ask your health care provider for ways to help you quit.  Ask family members to quit too.  Ask your health care provider for a referral to Quit Plan to help you quit smoking, or call 8-894-898-PLAN.     Colds, Flu, Bronchitis  These are common triggers of asthma. Wash your hands often.  Don t touch your eyes, nose or mouth.  Get a flu shot every year.     Dust Mites  These are tiny bugs that live in cloth or carpet. They are too small to see. Wash sheets and blankets in hot water every week.   Encase pillows and mattress in dust mite proof covers.  Avoid having carpet if you can. If you have carpet, vacuum weekly.   Use a dust mask and HEPA vacuum.   Pollen and Outdoor Mold  Some people are allergic to trees, grass, or weed pollen, or molds. Try to keep your windows closed.  Limit time out  doors when pollen count is high.   Ask you health care provider about taking medicine during allergy season.     Animal Dander  Some people are allergic to skin flakes, urine or saliva from pets with fur or feathers. Keep pets with fur or feathers out of your home.    If you can t keep the pet outdoors, then keep the pet out of your bedroom.  Keep the bedroom door closed.  Keep pets off cloth furniture and away from stuffed toys.     Mice, Rats, and Cockroaches  Some people are allergic to the waste from these pests.   Cover food and garbage.  Clean up spills and food crumbs.  Store grease in the refrigerator.   Keep food out of the bedroom.   Indoor Mold  This can be a trigger if your home has high moisture. Fix leaking faucets, pipes, or other sources of water.   Clean moldy surfaces.  Dehumidify basement if it is damp and smelly.   Smoke, Strong Odors, and Sprays  These can reduce air quality. Stay away from strong odors and sprays, such as perfume, powder, hair spray, paints, smoke incense, paint, cleaning products, candles and new carpet.   Exercise or Sports  Some people with asthma have this trigger. Be active!  Ask your doctor about taking medicine before sports or exercise to prevent symptoms.    Warm up for 5-10 minutes before and after sports or exercise.     Other Triggers of Asthma  Cold air:  Cover your nose and mouth with a scarf.  Sometimes laughing or crying can be a trigger.  Some medicines and food can trigger asthma.

## 2019-06-03 NOTE — NURSING NOTE
"Chief Complaint   Patient presents with     Physical     is fasting for blood work       Initial /70 (BP Location: Right arm)   Pulse 60   Temp 97.7  F (36.5  C) (Tympanic)   Resp 16   Ht 1.562 m (5' 1.5\")   Wt 73 kg (161 lb)   LMP 04/06/2016   SpO2 96%   BMI 29.93 kg/m   Estimated body mass index is 29.93 kg/m  as calculated from the following:    Height as of this encounter: 1.562 m (5' 1.5\").    Weight as of this encounter: 73 kg (161 lb).    Medication Reconciliation:  unable or not appropriate to perform    Perla Sierra CMA ([facility])    Screening Questionnaire for Adult Immunization    Are you sick today?   No   Do you have allergies to medications, food, a vaccine component or latex?   No   Have you ever had a serious reaction after receiving a vaccination?   No   Do you have a long-term health problem with heart disease, lung disease, asthma, kidney disease, metabolic disease (e.g. diabetes), anemia, or other blood disorder?   No   Do you have cancer, leukemia, HIV/AIDS, or any other immune system problem?   No   In the past 3 months, have you taken medications that affect  your immune system, such as prednisone, other steroids, or anticancer drugs; drugs for the treatment of rheumatoid arthritis, Crohn s disease, or psoriasis; or have you had radiation treatments?   No   Have you had a seizure, or a brain or other nervous system problem?   No   During the past year, have you received a transfusion of blood or blood     products, or been given immune (gamma) globulin or antiviral drug?   No   For women: Are you pregnant or is there a chance you could become        pregnant during the next month?   No   Have you received any vaccinations in the past 4 weeks?   No     Immunization questionnaire answers were all negative.        Per orders of Soledad Bauman NP, injection of TD given by Perla Sierra. Patient instructed to remain in clinic for 15 minutes afterwards, and to report any adverse " reaction to me immediately.       Screening performed by Prela Sierra on 6/3/2019 at 9:15 AM.

## 2019-06-03 NOTE — LETTER
Nery 3, 2019      Rose Mary Rose  [address] [address] [facility] [address]        Dear ,    We are writing to inform you of your test results.    Test results indicate you may require additional follow up, see comment below.    Thyroid is in a normal range.   Cholesterol is good.   Kidney function and electrolytes are good.   Blood sugar is minimally elevated - likely will improve on your new low carb diet.   Recheck labs in one year.     Resulted Orders   Lipid panel reflex to direct LDL Fasting   Result Value Ref Range    Cholesterol 203 (H) <200 mg/dL      Comment:      Desirable:       <200 mg/dl    Triglycerides 156 (H) <150 mg/dL      Comment:      Borderline high:  150-199 mg/dl  High:             200-499 mg/dl  Very high:       >499 mg/dl  Fasting specimen      HDL Cholesterol 69 >49 mg/dL    LDL Cholesterol Calculated 103 (H) <100 mg/dL      Comment:      Above desirable:  100-129 mg/dl  Borderline High:  130-159 mg/dL  High:             160-189 mg/dL  Very high:       >189 mg/dl      Non HDL Cholesterol 134 (H) <130 mg/dL      Comment:      Above Desirable:  130-159 mg/dl  Borderline high:  160-189 mg/dl  High:             190-219 mg/dl  Very high:       >219 mg/dl     Basic metabolic panel   Result Value Ref Range    Sodium 141 133 - 144 mmol/L    Potassium 3.8 3.4 - 5.3 mmol/L    Chloride 109 94 - 109 mmol/L    Carbon Dioxide 25 20 - 32 mmol/L    Anion Gap 7 3 - 14 mmol/L    Glucose 105 (H) 70 - 99 mg/dL      Comment:      Fasting specimen    Urea Nitrogen 9 7 - 30 mg/dL    Creatinine 0.67 0.52 - 1.04 mg/dL    GFR Estimate >90 >60 mL/min/[1.73_m2]      Comment:      Non  GFR Calc  Starting 12/18/2018, serum creatinine based estimated GFR (eGFR) will be   calculated using the Chronic Kidney Disease Epidemiology Collaboration   (CKD-EPI) equation.      GFR Estimate If Black >90 >60 mL/min/[1.73_m2]      Comment:       GFR Calc  Starting 12/18/2018, serum creatinine  based estimated GFR (eGFR) will be   calculated using the Chronic Kidney Disease Epidemiology Collaboration   (CKD-EPI) equation.      Calcium 9.2 8.5 - 10.1 mg/dL   TSH   Result Value Ref Range    TSH 1.48 0.40 - 4.00 mU/L     I have faxed your wellness screening form-  Original is enclosed for your records. (gk)  If you have any questions or concerns, please call the clinic at the number listed above.       Sincerely,        REDD Eubanks CNP

## 2019-06-03 NOTE — PATIENT INSTRUCTIONS
You are due for a screening Mammogram.  Please contact the Diagnostics Registration Department at: [phone] to schedule this appointment.    Preventive Health Recommendations  Female Ages 50 - 64    Yearly exam: See your health care provider every year in order to  o Review health changes.   o Discuss preventive care.    o Review your medicines if your doctor has prescribed any.      Get a Pap test every three years (unless you have an abnormal result and your provider advises testing more often).    If you get Pap tests with HPV test, you only need to test every 5 years, unless you have an abnormal result.     You do not need a Pap test if your uterus was removed (hysterectomy) and you have not had cancer.    You should be tested each year for STDs (sexually transmitted diseases) if you're at risk.     Have a mammogram every 1 to 2 years.    Have a colonoscopy at age 50, or have a yearly FIT test (stool test). These exams screen for colon cancer.      Have a cholesterol test every 5 years, or more often if advised.    Have a diabetes test (fasting glucose) every three years. If you are at risk for diabetes, you should have this test more often.     If you are at risk for osteoporosis (brittle bone disease), think about having a bone density scan (DEXA).    Shots: Get a flu shot each year. Get a tetanus shot every 10 years.    Nutrition:     Eat at least 5 servings of fruits and vegetables each day.    Eat whole-grain bread, whole-wheat pasta and brown rice instead of white grains and rice.    Get adequate Calcium and Vitamin D.     Lifestyle    Exercise at least 150 minutes a week (30 minutes a day, 5 days a week). This will help you control your weight and prevent disease.    Limit alcohol to one drink per day.    No smoking.     Wear sunscreen to prevent skin cancer.     See your dentist every six months for an exam and cleaning.    See your eye doctor every 1 to 2 years.

## 2019-06-03 NOTE — PROGRESS NOTES
SUBJECTIVE:   CC: Rose Mary Rose is an 54 year old woman who presents for preventive health visit.     Healthy Habits:    Do you get at least three servings of calcium containing foods daily (dairy, green leafy vegetables, etc.)? no, taking calcium and/or vitamin D supplement: no    Amount of exercise or daily activities, outside of work: 3 day(s) per week    Problems taking medications regularly No    Medication side effects: No    Have you had an eye exam in the past two years? yes    Do you see a dentist twice per year? yes    Do you have sleep apnea, excessive snoring or daytime drowsiness?no      Hypertension Follow-up      Do you check your blood pressure regularly outside of the clinic? No     Are you following a low salt diet? Yes    Are your blood pressures ever more than 140 on the top number (systolic) OR more   than 90 on the bottom number (diastolic), for example 140/90? Not checked      Asthma Follow-Up    Was ACT completed today?    Yes    ACT Total Scores 6/3/2019   ACT TOTAL SCORE -   ASTHMA ER VISITS -   ASTHMA HOSPITALIZATIONS -   ACT TOTAL SCORE (Goal Greater than or Equal to 20) 19   In the past 12 months, how many times did you visit the emergency room for your asthma without being admitted to the hospital? 2   In the past 12 months, how many times were you hospitalized overnight because of your asthma? 0       How many days per week do you miss taking your asthma controller medication?  0    Please describe any recent triggers for your asthma: humidity and Patient is unaware of triggers    Have you had any Emergency Room Visits, Urgent Care Visits, or Hospital Admissions since your last office visit?  No      Hypothyroidism Follow-up      Since last visit, patient describes the following symptoms: constipation      Today's PHQ-2 Score:   PHQ-2 ( 1999 [employer]) 6/3/2019 1/28/2019   Q1: Little interest or pleasure in doing things 0 0   Q2: Feeling down, depressed or hopeless 0 0   PHQ-2  Score 0 0   Q1: Little interest or pleasure in doing things - -   Q2: Feeling down, depressed or hopeless - -   PHQ-2 Score - -       Abuse: Current or Past(Physical, Sexual or Emotional)- No  Do you feel safe in your environment? Yes    Social History     Tobacco Use     Smoking status: Light Tobacco Smoker     Packs/day: 0.25     Years: 25.00     Pack years: 6.25     Types: Cigarettes     Last attempt to quit: 3/14/2016     Years since quitting: 3.2     Smokeless tobacco: Never Used     Tobacco comment: once in a while still smokes   Substance Use Topics     Alcohol use: No     Comment: 1 beer per month, usually none     If you drink alcohol do you typically have >3 drinks per day or >7 drinks per week? No                     Reviewed orders with patient.  Reviewed health maintenance and updated orders accordingly - Yes          Pertinent mammograms are reviewed under the imaging tab.     History of abnormal Pap smear: YES - updated in Problem List and Health Maintenance. Once in her 30s - all normal since.  PAP / HPV Latest Ref Rng & Units 6/5/2018 7/24/2015 8/31/2012   PAP - NIL NIL NIL   HPV 16 DNA NEG:Negative Negative - -   HPV 18 DNA NEG:Negative Negative - -   OTHER HR HPV NEG:Negative Negative - -     Reviewed and updated as needed this visit by clinical staff  Tobacco  Allergies  Meds  Med Hx  Surg Hx  Fam Hx  Soc Hx        Reviewed and updated as needed this visit by Provider            ROS:  CONSTITUTIONAL: NEGATIVE for fever, chills, change in weight  INTEGUMENTARY/SKIN: NEGATIVE for worrisome rashes, moles or lesions  EYES: NEGATIVE for vision changes or irritation  ENT: NEGATIVE for ear, mouth and throat problems  RESP: NEGATIVE for significant cough or SOB  BREAST: NEGATIVE for masses, tenderness or discharge  CV: NEGATIVE for chest pain, palpitations or peripheral edema  GI: NEGATIVE for nausea, abdominal pain, heartburn, or change in bowel habits  : NEGATIVE for unusual urinary or vaginal  "symptoms. No vaginal bleeding.  MUSCULOSKELETAL: NEGATIVE for significant arthralgias or myalgia  NEURO: NEGATIVE for weakness, dizziness or paresthesias  PSYCHIATRIC: NEGATIVE for changes in mood or affect     OBJECTIVE:   /70 (BP Location: Right arm)   Pulse 60   Temp 97.7  F (36.5  C) (Tympanic)   Resp 16   Ht 1.562 m (5' 1.5\")   Wt 73 kg (161 lb)   LMP 04/06/2016   SpO2 96%   BMI 29.93 kg/m    EXAM:  GENERAL APPEARANCE: healthy, alert and no distress  EYES: Eyes grossly normal to inspection, PERRL and conjunctivae and sclerae normal  HENT: ear canals and TM's normal, nose and mouth without ulcers or lesions, oropharynx clear and oral mucous membranes moist  NECK: no adenopathy, no asymmetry, masses, or scars and thyroid normal to palpation  RESP: lungs clear to auscultation - no rales, rhonchi or wheezes  BREAST: normal without masses, tenderness or nipple discharge and no palpable axillary masses or adenopathy  CV: regular rate and rhythm, normal S1 S2, no S3 or S4, no murmur, click or rub, no peripheral edema and peripheral pulses strong  ABDOMEN: soft, nontender, no hepatosplenomegaly, no masses and bowel sounds normal   (female): normal female external genitalia and normal urethral meatus  MS: no musculoskeletal defects are noted and gait is age appropriate without ataxia  SKIN: no suspicious lesions or rashes  NEURO: Normal strength and tone, sensory exam grossly normal, mentation intact and speech normal  PSYCH: mentation appears normal and affect normal/bright        ASSESSMENT/PLAN:       ICD-10-CM    1. Routine general medical examination at a health care facility Z00.00 *MA Screening Digital Bilateral     Lipid panel reflex to direct LDL Fasting   2. Moderate persistent asthma without complication J45.40 albuterol (PROVENTIL) (2.5 MG/3ML) 0.083% neb solution     albuterol (PROAIR HFA/PROVENTIL HFA/VENTOLIN HFA) 108 (90 Base) MCG/ACT inhaler     beclomethasone HFA (QVAR REDIHALER) 80 " "MCG/ACT inhaler     ipratropium - albuterol 0.5 mg/2.5 mg/3 mL (DUONEB) 0.5-2.5 (3) MG/3ML neb solution   3. Essential hypertension with goal blood pressure less than 140/90 I10 lisinopril (PRINIVIL/ZESTRIL) 10 MG tablet     Basic metabolic panel   4. Gastroesophageal reflux disease without esophagitis K21.9    5. Acquired hypothyroidism E03.9 levothyroxine (SYNTHROID/LEVOTHROID) 25 MCG tablet     TSH   6. Encounter for vaccination Z23 TD (ADULT, 7+) PRESERVE FREE     ADMIN 1st VACCINE       COUNSELING:   Reviewed preventive health counseling, as reflected in patient instructions    Estimated body mass index is 29.93 kg/m  as calculated from the following:    Height as of this encounter: 1.562 m (5' 1.5\").    Weight as of this encounter: 73 kg (161 lb).    Weight management plan: Discussed healthy diet and exercise guidelines       reports that she has been smoking cigarettes.  She has a 6.25 pack-year smoking history. She has never used smokeless tobacco.  Tobacco Cessation Action Plan: Self help information given to patient      The risks, benefits and treatment options of prescribed medications or other treatments have been discussed with the patient. The patient verbalized their understanding and should call or follow up if no improvement or if they develop further problems.    REDD Eubanks [facility] [place]  "

## 2019-06-04 ASSESSMENT — ASTHMA QUESTIONNAIRES: ACT_TOTALSCORE: 19

## 2019-06-14 ENCOUNTER — MYC MEDICAL ADVICE (OUTPATIENT)
Dept: FAMILY MEDICINE | Facility: CLINIC | Age: 54
End: 2019-06-14

## 2019-06-14 NOTE — TELEPHONE ENCOUNTER
Huddled with PCP and [employer]an [employer]LE[employer] Form was faxed, copy in forms drawer.      NIECY HudsonN, RN

## 2019-06-14 NOTE — TELEPHONE ENCOUNTER
Soledad,    Please see patient's MyChart note regarding screening form for [employer].     Was this faxed after appointment on 06/03/19?     NIECY HudsonN, RN

## 2019-07-05 ENCOUNTER — HOSPITAL ENCOUNTER (OUTPATIENT)
Dept: MAMMOGRAPHY | Facility: CLINIC | Age: 54
Discharge: HOME | End: 2019-07-05
Attending: NURSE PRACTITIONER | Admitting: NURSE PRACTITIONER
Payer: COMMERCIAL

## 2019-07-05 DIAGNOSIS — Z12.31 VISIT FOR SCREENING MAMMOGRAM: ICD-10-CM

## 2019-07-05 PROCEDURE — 77067 SCR MAMMO BI INCL CAD: CPT

## 2019-07-13 ENCOUNTER — HOSPITAL ENCOUNTER (EMERGENCY)
Facility: CLINIC | Age: 54
Discharge: HOME | End: 2019-07-13
Attending: NURSE PRACTITIONER | Admitting: NURSE PRACTITIONER
Payer: COMMERCIAL

## 2019-07-13 VITALS
HEIGHT: 61 IN | SYSTOLIC BLOOD PRESSURE: 156 MMHG | RESPIRATION RATE: 16 BRPM | TEMPERATURE: 97.7 F | DIASTOLIC BLOOD PRESSURE: 86 MMHG | HEART RATE: 88 BPM | BODY MASS INDEX: 30.42 KG/M2 | OXYGEN SATURATION: 97 %

## 2019-07-13 DIAGNOSIS — L25.9 CONTACT DERMATITIS: ICD-10-CM

## 2019-07-13 PROCEDURE — G0463 HOSPITAL OUTPT CLINIC VISIT: HCPCS | Performed by: NURSE PRACTITIONER

## 2019-07-13 PROCEDURE — 99213 OFFICE O/P EST LOW 20 MIN: CPT | Mod: Z6 | Performed by: NURSE PRACTITIONER

## 2019-07-13 RX ORDER — TRIAMCINOLONE ACETONIDE 1 MG/G
CREAM TOPICAL
Qty: 80 G | Refills: 0 | Status: SHIPPED | OUTPATIENT
Start: 2019-07-13 | End: 2019-07-15

## 2019-07-13 ASSESSMENT — ENCOUNTER SYMPTOMS
COUGH: 0
FATIGUE: 0
HEADACHES: 0
ABDOMINAL PAIN: 0
FEVER: 0
JOINT SWELLING: 0
MYALGIAS: 0
NAUSEA: 0
ARTHRALGIAS: 0
SHORTNESS OF BREATH: 0
WEAKNESS: 0
CHILLS: 0

## 2019-07-13 NOTE — ED AVS SNAPSHOT
[facility]  [address] [address]  Phone:  [phone]  Fax:  [phone]                                    Rose Mary Rose   MRN: [id number]    Department:  [facility]   Date of Visit:  7/13/2019           After Visit Summary Signature Page    I have received my discharge instructions, and my questions have been answered. I have discussed any challenges I see with this plan with the nurse or doctor.    ..........................................................................................................................................  Patient/Patient Representative Signature      ..........................................................................................................................................  Patient Representative Print Name and Relationship to Patient    ..................................................               ................................................  Date                                   Time    ..........................................................................................................................................  Reviewed by Signature/Title    ...................................................              ..............................................  Date                                               Time          22EPIC Rev 08/18

## 2019-07-14 NOTE — ED PROVIDER NOTES
History     Chief Complaint   Patient presents with     Rash     HPI  Rose Mary Rose is a 54 year old female who presents to the urgent care for evaluation of a rash. Patient noticed the rash to the right lateral shin 3 days ago. Presents today due to wanting to confirm there is not an infection. Denies any fevers, streaking, abnormal discharge or symptoms accompanying the rash. Was recently camping with multiple mosquito bites. No known new contacts/exposures.     Allergies:  Allergies   Allergen Reactions     Penicillins Rash       Problem List:    Patient Active Problem List    Diagnosis Date Noted     Advanced directives, counseling/discussion 04/20/2018     Priority: Medium     Advance Care Planning 4/20/2018: ACP Review of Chart / Resources Provided:  Reviewed chart for advance care plan.  Rose Mary Rose has been provided information and resources to begin or update their advance care plan.  Added by Rachelle Buchanan             Acquired hypothyroidism 10/31/2017     Priority: Medium     HTN (hypertension) 03/31/2014     Priority: Medium     NAFLD (nonalcoholic fatty liver disease) 03/27/2014     Priority: Medium     Prediabetes 08/31/2012     Priority: Medium     Moderate persistent asthma 04/25/2012     Priority: Medium     Family history of diabetes mellitus 04/02/2012     Priority: Medium     GERD (gastroesophageal reflux disease) 04/02/2012     Priority: Medium     Zantac not effective       CARDIOVASCULAR SCREENING; LDL GOAL LESS THAN 130 03/19/2012     Priority: Medium     Family history of colon cancer 09/08/2009     Priority: Medium     Enlarged thyroid with 2mm cyst  11/01/2007     Priority: Medium     Sono shows mildly enlarged thryoid with 2mm cyst right lobe. TSH wnl. Sono from 1/2002 showed prominence of thyroid and no cyst.       Obesity 08/21/2007     Priority: Medium     Problem list name updated by automated process. Provider to review          Past Medical History:    Past Medical  History:   Diagnosis Date     Abnormal Papanicolaou smear of cervix and cervical HPV 2003     Advanced directives, counseling/discussion 4/20/2018     Eczema      Human papillomavirus in conditions classified elsewhere and of unspecified site        Past Surgical History:    Past Surgical History:   Procedure Laterality Date     COLONOSCOPY  12/3/2012    Procedure: COLONOSCOPY;  Colonoscopy;  Surgeon: Darnell Siddiqui MD;  Location: [facility]     COLONOSCOPY N/A 6/4/2018    Procedure: COLONOSCOPY;  colonoscopy;  Surgeon: Nakul Antonio MD;  Location: [facility]     ESOPHAGOSCOPY, GASTROSCOPY, DUODENOSCOPY (EGD), COMBINED N/A 5/5/2017    Procedure: COMBINED ESOPHAGOSCOPY, GASTROSCOPY, DUODENOSCOPY (EGD), BIOPSY SINGLE OR MULTIPLE;  Gastroscopy;  Surgeon: Yonny Zambrano MD;  Location: [facility]     NO HISTORY OF SURGERY  8/2007       Family History:    Family History   Problem Relation Age of Onset     Hypertension Mother      Diabetes Mother      C.A.D. Maternal Grandmother      Hypertension Maternal Grandmother      Cerebrovascular Disease Maternal Grandmother      Cancer Paternal Grandfather         ? type     Diabetes Paternal Grandfather         Adult onset     Cancer Maternal Grandfather         ? renal     Diabetes Father      Hypertension Father      Cancer - colorectal Father      Coronary Artery Disease Father      Respiratory Daughter         asthma     Heart Disease Brother         double bypass     C.A.D. Brother      Cancer Brother         testicular ca     Hypertension Brother      Myocardial Infarction Brother      Breast Cancer No family hx of        Social History:  Marital Status:   [4]  Social History     Tobacco Use     Smoking status: Light Tobacco Smoker     Packs/day: 0.25     Years: 25.00     Pack years: 6.25     Types: Cigarettes     Last attempt to quit: 3/14/2016     Years since quitting: 3.3     Smokeless tobacco: Never Used     Tobacco comment: 2-3 per week   Substance Use Topics      "Alcohol use: No     Comment: 1 beer per month, usually none     Drug use: No        Medications:      triamcinolone (KENALOG) 0.1 % external cream   albuterol (PROAIR HFA/PROVENTIL HFA/VENTOLIN HFA) 108 (90 Base) MCG/ACT inhaler   albuterol (PROVENTIL) (2.5 MG/3ML) 0.083% neb solution   beclomethasone HFA (QVAR REDIHALER) 80 MCG/ACT inhaler   ipratropium - albuterol 0.5 mg/2.5 mg/3 mL (DUONEB) 0.5-2.5 (3) MG/3ML neb solution   levothyroxine (SYNTHROID/LEVOTHROID) 25 MCG tablet   lisinopril (PRINIVIL/ZESTRIL) 10 MG tablet   loratadine (CLARITIN) 10 MG tablet   omeprazole (PRILOSEC) 40 MG capsule         Review of Systems   Constitutional: Negative for chills, fatigue and fever.   Respiratory: Negative for cough and shortness of breath.    Cardiovascular: Negative for chest pain.   Gastrointestinal: Negative for abdominal pain and nausea.   Musculoskeletal: Negative for arthralgias, gait problem, joint swelling and myalgias.   Skin: Positive for rash. Negative for pallor.   Neurological: Negative for weakness and headaches.       Physical Exam   BP: 156/86  Pulse: 88  Temp: 97.7  F (36.5  C)  Resp: 16  Height: 154.9 cm (5' 1\")  SpO2: 97 %      Physical Exam   Constitutional: She is oriented to person, place, and time. She appears well-developed and well-nourished. No distress.   Eyes: Pupils are equal, round, and reactive to light. Conjunctivae and EOM are normal.   Neck: Normal range of motion. Neck supple.   Cardiovascular: Normal rate and regular rhythm.   Pulmonary/Chest: Effort normal and breath sounds normal. No respiratory distress.   Musculoskeletal: Normal range of motion.   Neurological: She is alert and oriented to person, place, and time.   Skin: Skin is warm. Capillary refill takes less than 2 seconds. She is not diaphoretic.   6 small papules to the right lateral shin appearing to be insect bites. No current signs of infection        ED Course        Procedures             No results found for this or " any previous visit (from the past 24 hour(s)).    Medications - No data to display    Assessments & Plan (with Medical Decision Making)   Patient is a 54 year old female who presents to the urgent care for evaluation of a rash. Physical exam normal other than 6 small papules to the right lateral shin appearing to be insect bites. No currently signs of infection. Plan for topical triamcinolone, keep the area clean and dry.  May utilize over-the-counter medications as needed.  Return with any new or worsening symptoms.  Patient agreeable to plan of care, all questions answered.  Patient discharged in stable condition.  I have reviewed the nursing notes.    I have reviewed the findings, diagnosis, plan and need for follow up with the patient.        Medication List      Started    triamcinolone 0.1 % external cream  Commonly known as:  KENALOG  Apply to area TID            Final diagnoses:   Contact dermatitis       7/13/2019   [facility] EMERGENCY DEPARTMENT     Roslyn Bobo, REDD CNP  07/13/19 [id number]

## 2019-07-15 ENCOUNTER — HOSPITAL ENCOUNTER (EMERGENCY)
Facility: CLINIC | Age: 54
Discharge: HOME | End: 2019-07-15
Attending: STUDENT IN AN ORGANIZED HEALTH CARE EDUCATION/TRAINING PROGRAM | Admitting: STUDENT IN AN ORGANIZED HEALTH CARE EDUCATION/TRAINING PROGRAM
Payer: COMMERCIAL

## 2019-07-15 VITALS
DIASTOLIC BLOOD PRESSURE: 85 MMHG | HEART RATE: 80 BPM | OXYGEN SATURATION: 96 % | SYSTOLIC BLOOD PRESSURE: 177 MMHG | RESPIRATION RATE: 18 BRPM | TEMPERATURE: 97.6 F

## 2019-07-15 DIAGNOSIS — L25.9 CONTACT DERMATITIS, UNSPECIFIED CONTACT DERMATITIS TYPE, UNSPECIFIED TRIGGER: ICD-10-CM

## 2019-07-15 PROCEDURE — 99282 EMERGENCY DEPT VISIT SF MDM: CPT | Performed by: STUDENT IN AN ORGANIZED HEALTH CARE EDUCATION/TRAINING PROGRAM

## 2019-07-15 PROCEDURE — 99282 EMERGENCY DEPT VISIT SF MDM: CPT | Mod: Z6 | Performed by: STUDENT IN AN ORGANIZED HEALTH CARE EDUCATION/TRAINING PROGRAM

## 2019-07-15 RX ORDER — TRIAMCINOLONE ACETONIDE 5 MG/G
1 CREAM TOPICAL 2 TIMES DAILY
Qty: 14 G | Refills: 0 | Status: SHIPPED | OUTPATIENT
Start: 2019-07-15 | End: 2019-10-01

## 2019-07-15 NOTE — ED AVS SNAPSHOT
[facility]  [address] [address]  Phone:  [phone]  Fax:  [phone]                                    Rose Mary Rose   MRN: [id number]    Department:  [facility]   Date of Visit:  7/15/2019           After Visit Summary Signature Page    I have received my discharge instructions, and my questions have been answered. I have discussed any challenges I see with this plan with the nurse or doctor.    ..........................................................................................................................................  Patient/Patient Representative Signature      ..........................................................................................................................................  Patient Representative Print Name and Relationship to Patient    ..................................................               ................................................  Date                                   Time    ..........................................................................................................................................  Reviewed by Signature/Title    ...................................................              ..............................................  Date                                               Time          22EPIC Rev 08/18

## 2019-07-15 NOTE — ED PROVIDER NOTES
History     Chief Complaint   Patient presents with     Rash     HPI  Rose Mary Rose is a 54 year old female who presents for evaluation of right lower extremity skin rash.  Patient explains that 1 week ago while camping she developed the rash on the lateral aspect of her right lower extremity.  It is somewhat uncomfortable more irritation than pruritic than painful.  Records confirm that she was seen in the urgent care on 7/13/2019 and diagnosed with contact dermatitis, she says that she has applied 0.1% triamcinolone cream twice and the rash does not appear to have improved.  She denies fever, chills, abscess formation or purulent discharge.  No other skin rashes or lesions.  No recent tick exposures.    Allergies:  Allergies   Allergen Reactions     Penicillins Rash       Problem List:    Patient Active Problem List    Diagnosis Date Noted     Advanced directives, counseling/discussion 04/20/2018     Priority: Medium     Advance Care Planning 4/20/2018: ACP Review of Chart / Resources Provided:  Reviewed chart for advance care plan.  Rose Mary Rose has been provided information and resources to begin or update their advance care plan.  Added by Rachelle Buchanan             Acquired hypothyroidism 10/31/2017     Priority: Medium     HTN (hypertension) 03/31/2014     Priority: Medium     NAFLD (nonalcoholic fatty liver disease) 03/27/2014     Priority: Medium     Prediabetes 08/31/2012     Priority: Medium     Moderate persistent asthma 04/25/2012     Priority: Medium     Family history of diabetes mellitus 04/02/2012     Priority: Medium     GERD (gastroesophageal reflux disease) 04/02/2012     Priority: Medium     Zantac not effective       CARDIOVASCULAR SCREENING; LDL GOAL LESS THAN 130 03/19/2012     Priority: Medium     Family history of colon cancer 09/08/2009     Priority: Medium     Enlarged thyroid with 2mm cyst  11/01/2007     Priority: Medium     Sono shows mildly enlarged thryoid with 2mm cyst  right lobe. TSH wnl. Sono from 1/2002 showed prominence of thyroid and no cyst.       Obesity 08/21/2007     Priority: Medium     Problem list name updated by automated process. Provider to review          Past Medical History:    Past Medical History:   Diagnosis Date     Abnormal Papanicolaou smear of cervix and cervical HPV 2003     Advanced directives, counseling/discussion 4/20/2018     Eczema      Human papillomavirus in conditions classified elsewhere and of unspecified site        Past Surgical History:    Past Surgical History:   Procedure Laterality Date     COLONOSCOPY  12/3/2012    Procedure: COLONOSCOPY;  Colonoscopy;  Surgeon: Darnell Siddiqui MD;  Location: [facility]     COLONOSCOPY N/A 6/4/2018    Procedure: COLONOSCOPY;  colonoscopy;  Surgeon: Nakul Antonio MD;  Location: [facility]     ESOPHAGOSCOPY, GASTROSCOPY, DUODENOSCOPY (EGD), COMBINED N/A 5/5/2017    Procedure: COMBINED ESOPHAGOSCOPY, GASTROSCOPY, DUODENOSCOPY (EGD), BIOPSY SINGLE OR MULTIPLE;  Gastroscopy;  Surgeon: Yonny Zambrano MD;  Location: [facility]     NO HISTORY OF SURGERY  8/2007       Family History:    Family History   Problem Relation Age of Onset     Hypertension Mother      Diabetes Mother      C.A.D. Maternal Grandmother      Hypertension Maternal Grandmother      Cerebrovascular Disease Maternal Grandmother      Cancer Paternal Grandfather         ? type     Diabetes Paternal Grandfather         Adult onset     Cancer Maternal Grandfather         ? renal     Diabetes Father      Hypertension Father      Cancer - colorectal Father      Coronary Artery Disease Father      Respiratory Daughter         asthma     Heart Disease Brother         double bypass     C.A.D. Brother      Cancer Brother         testicular ca     Hypertension Brother      Myocardial Infarction Brother      Breast Cancer No family hx of        Social History:  Marital Status:   [4]  Social History     Tobacco Use     Smoking status: Light Tobacco Smoker      Packs/day: 0.25     Years: 25.00     Pack years: 6.25     Types: Cigarettes     Last attempt to quit: 3/14/2016     Years since quitting: 3.3     Smokeless tobacco: Never Used     Tobacco comment: 2-3 per week   Substance Use Topics     Alcohol use: No     Comment: 1 beer per month, usually none     Drug use: No        Medications:      triamcinolone (ARISTOCORT HP) 0.5 % external cream   albuterol (PROAIR HFA/PROVENTIL HFA/VENTOLIN HFA) 108 (90 Base) MCG/ACT inhaler   albuterol (PROVENTIL) (2.5 MG/3ML) 0.083% neb solution   beclomethasone HFA (QVAR REDIHALER) 80 MCG/ACT inhaler   ipratropium - albuterol 0.5 mg/2.5 mg/3 mL (DUONEB) 0.5-2.5 (3) MG/3ML neb solution   levothyroxine (SYNTHROID/LEVOTHROID) 25 MCG tablet   lisinopril (PRINIVIL/ZESTRIL) 10 MG tablet   loratadine (CLARITIN) 10 MG tablet   omeprazole (PRILOSEC) 40 MG capsule         Review of Systems  Constitutional:  Negative for fever or recent illness.  Musculoskeletal: Negative for joint pain or swelling.  Skin: Positive for right lower extremity rash    All others reviewed and are negative.      Physical Exam   BP: 177/85  Pulse: 80  Temp: 97.6  F (36.4  C)  Resp: 18  SpO2: 96 %      Physical Exam  Constitutional:  Well developed, well nourished.  Appears nontoxic and in no acute distress.   HENT:  Normocephalic and atraumatic.  Eyes:  Conjunctivae are normal.  Neck:  Neck supple.  Cardiovascular:  No cyanosis.   Respiratory:  Effort normal, no respiratory distress.   Musculoskeletal:  Moves extremities spontaneously.  No knee or ankle swelling.  Neurological:  Patient is alert.  Skin:  Skin is warm and dry.  Erythematous rash of right lateral calf with small vesicles.  Psychiatric:  Normal mood and affect.      ED Course        Procedures               Critical Care time:  none               No results found for this or any previous visit (from the past 24 hour(s)).    Medications - No data to display    Assessments & Plan (with Medical Decision  Making)   Rose Mary Rose is a 54 year old female who presents to the department for evaluation of persistent right lower extreme the skin rash.  Patient brought her telephone with pictures from 48 hours ago which appear unchanged, does not seem to have significantly progressed or improved.  There is no papular or pustular formation but there does appear to be some small vesicular lesions, rash looks most consistent with contact dermatitis and/or possible poison ivy exposure.  It is not warm or tender as would be expected with cellulitis, and does not have a follicular appearance at this time.  Recommend more potent topical steroid and follow-up over the next 2-3 days if symptoms have not dramatically improved.        Disclaimer:  This note consists of symbols derived from keyboarding, dictation, and/or voice recognition software.  As a result, there may be errors in the script that have gone undetected.  Please consider this when interpreting information found in the chart.        I have reviewed the nursing notes.    I have reviewed the findings, diagnosis, plan and need for follow up with the patient.          Medication List      Started    triamcinolone 0.5 % external cream  Commonly known as:  ARISTOCORT HP  1 applicator, Topical, 2 TIMES DAILY  Replaces:  triamcinolone 0.1 % external cream        Discontinued    triamcinolone 0.1 % external cream  Commonly known as:  KENALOG  Replaced by:  triamcinolone 0.5 % external cream            Final diagnoses:   Contact dermatitis, unspecified contact dermatitis type, unspecified trigger       7/15/2019   [facility] EMERGENCY DEPARTMENT     Alejo Schmidt,   07/15/19 0457

## 2019-07-15 NOTE — LETTER
July 15, 2019      To Whom It May Concern:      Rose Mary Rose was seen in our Emergency Department today, 07/15/19.  I expect her condition to improve over the next 1-2 days.  She may return to work/school when improved.    Sincerely,        Alejo Schmidt, DO

## 2019-07-15 NOTE — ED NOTES
Seen Saturday in UC for rash right lower leg.  States she doesn't think its better.  Placed on ointment which she has used.  No drainage noted.  MD in room

## 2019-07-17 ENCOUNTER — OFFICE VISIT (OUTPATIENT)
Dept: FAMILY MEDICINE | Facility: CLINIC | Age: 54
End: 2019-07-17
Payer: COMMERCIAL

## 2019-07-17 VITALS
BODY MASS INDEX: 30.23 KG/M2 | WEIGHT: 160 LBS | TEMPERATURE: 98.2 F | HEART RATE: 78 BPM | RESPIRATION RATE: 12 BRPM | SYSTOLIC BLOOD PRESSURE: 120 MMHG | OXYGEN SATURATION: 97 % | DIASTOLIC BLOOD PRESSURE: 72 MMHG

## 2019-07-17 DIAGNOSIS — R21 RASH: Primary | ICD-10-CM

## 2019-07-17 LAB
CRP SERPL-MCNC: 7.7 MG/L (ref 0–8)
WBC # BLD AUTO: 5.3 10E9/L (ref 4–11)

## 2019-07-17 PROCEDURE — 99213 OFFICE O/P EST LOW 20 MIN: CPT | Performed by: NURSE PRACTITIONER

## 2019-07-17 PROCEDURE — 87252 VIRUS INOCULATION TISSUE: CPT | Mod: 90 | Performed by: NURSE PRACTITIONER

## 2019-07-17 PROCEDURE — 85048 AUTOMATED LEUKOCYTE COUNT: CPT | Performed by: NURSE PRACTITIONER

## 2019-07-17 PROCEDURE — 36415 COLL VENOUS BLD VENIPUNCTURE: CPT | Performed by: NURSE PRACTITIONER

## 2019-07-17 PROCEDURE — 99000 SPECIMEN HANDLING OFFICE-LAB: CPT | Performed by: NURSE PRACTITIONER

## 2019-07-17 PROCEDURE — 86140 C-REACTIVE PROTEIN: CPT | Performed by: NURSE PRACTITIONER

## 2019-07-17 RX ORDER — VALACYCLOVIR HYDROCHLORIDE 1 G/1
1000 TABLET, FILM COATED ORAL 3 TIMES DAILY
Qty: 21 TABLET | Refills: 0 | Status: SHIPPED | OUTPATIENT
Start: 2019-07-17 | End: 2019-07-22

## 2019-07-17 NOTE — PATIENT INSTRUCTIONS
Thank you for choosing [facility].  You may be receiving an email and/or telephone survey request from [facility] Customer Experience regarding your visit today.  Please take a few minutes to respond to the survey to let us know how we are doing.      If you have questions or concerns, please contact us via [employer] or you can contact your care team at [phone].    Our Clinic hours are:  Monday 6:40 am  to 7:00 pm  Tuesday -Friday 6:40 am to 5:00 pm    The [place] outpatient lab hours are:  Monday - Friday 6:10 am to 4:45 pm  Saturdays 7:00 am to 11:00 am  Appointments are required, call [phone]    If you have clinical questions after hours or would like to schedule an appointment,  call the clinic at [phone].

## 2019-07-17 NOTE — PROGRESS NOTES
Subjective     Rose Mary Rose is a 54 year old female who presents to clinic today for the following health issues:    HPI   ED/UC Followup:    Facility:  [facility] urgent care  Date of visit: 7/13 and 7/15  Reason for visit: rash on right lower leg- diagnosed as contact dermatitis  Cluster of spots on the lateral aspect of the right lower leg  Rash is painful.  Current Status: patient states she now has a spot on the top of her foot   Her lower leg muscle is painful - she though her sciatica was acting up.  A little bit itchy, not bad  Rash started 10 days ago after camping  Didn't go in the water above her ankles  Has used triamcinolone cream 0.01% and 0.05% with no relief in symptoms  She is concerned for infection     No fever or chills.  She had chicken pox as a child.  Has not had the shingles vaccine yet.                Reviewed and updated as needed this visit by Provider         Review of Systems   ROS COMP: Constitutional, HEENT, cardiovascular, pulmonary, gi and gu systems are negative, except as otherwise noted.      Objective    /72 (BP Location: Right arm)   Pulse 78   Temp 98.2  F (36.8  C) (Tympanic)   Resp 12   Wt 72.6 kg (160 lb)   LMP 04/06/2016   SpO2 97%   BMI 30.23 kg/m    Body mass index is 30.23 kg/m .  Physical Exam   GENERAL: healthy, alert and no distress  SKIN: Cluster of vesicular lesions on erythematous base on the lateral aspect of the right lower leg. New smaller cluster forming on the top of the right foot. No surrounding erythema.            Assessment & Plan       ICD-10-CM    1. Rash R21 WBC count     CRP inflammation     valACYclovir (VALTREX) 1000 mg tablet     Vesicular rash, painful.  Was treated in the UC for contact dermatitis with steroid creams - not helpful.  Etiology unclear.  Exam is not consistent with cellulitis.  Given the pain she is having in the leg, and the pain of the rash, concern for shingles.  Will treat with antivirals today.  Viral  culture obtained.  Will also check WBC and CRP.  Follow up in one week.            No follow-ups on file.    REDD Eubanks CNP  [facility] [place]

## 2019-07-19 ENCOUNTER — MYC MEDICAL ADVICE (OUTPATIENT)
Dept: FAMILY MEDICINE | Facility: CLINIC | Age: 54
End: 2019-07-19

## 2019-07-19 DIAGNOSIS — B02.9 HERPES ZOSTER WITHOUT COMPLICATION: Primary | ICD-10-CM

## 2019-07-19 NOTE — TELEPHONE ENCOUNTER
Pt requests medication alternative to Valtrex due to difficulty swallowing.  See [employer] message.  Await pt reply.  Chiara Garcia RN

## 2019-07-22 RX ORDER — ACYCLOVIR 800 MG/1
800 TABLET ORAL
Qty: 35 TABLET | Refills: 0 | Status: SHIPPED | OUTPATIENT
Start: 2019-07-22 | End: 2019-09-12

## 2019-07-22 NOTE — TELEPHONE ENCOUNTER
Apologize to the patient that this wasn't addressed on Friday - I was out of clinic.  I sent in the acyclovir.  Soledad Bauman, CNP

## 2019-07-24 ENCOUNTER — OFFICE VISIT (OUTPATIENT)
Dept: FAMILY MEDICINE | Facility: CLINIC | Age: 54
End: 2019-07-24
Payer: COMMERCIAL

## 2019-07-24 VITALS
DIASTOLIC BLOOD PRESSURE: 74 MMHG | TEMPERATURE: 98.1 F | WEIGHT: 160 LBS | BODY MASS INDEX: 30.21 KG/M2 | HEART RATE: 74 BPM | RESPIRATION RATE: 20 BRPM | SYSTOLIC BLOOD PRESSURE: 128 MMHG | HEIGHT: 61 IN | OXYGEN SATURATION: 96 %

## 2019-07-24 DIAGNOSIS — R21 RASH: Primary | ICD-10-CM

## 2019-07-24 PROCEDURE — 99213 OFFICE O/P EST LOW 20 MIN: CPT | Performed by: NURSE PRACTITIONER

## 2019-07-24 ASSESSMENT — MIFFLIN-ST. JEOR: SCORE: 1253.14

## 2019-07-24 NOTE — PROGRESS NOTES
"Subjective     Rose Mary Rose is a 54 year old female who presents to clinic today for the following health issues:    HPI   Rash follow up      Duration: Initially seen in the ER on 7-13 and 7-15-19.  Clinic follow up on 7-17-19.    At clinic visit, concern was for shingles.    She was unable to swallow the Valtrex tablets - was switched to acyclovir.    Description  Location: Right lower leg.  She states this is slowly getting better.  Itching: mild  Pain is improved at the rash site.  Still having nerve pain involving the sciatic    Intensity:  mild    Accompanying signs and symptoms: None    History (similar episodes/previous evaluation): See above for previous clinic visits.    Precipitating or alleviating factors:  New exposures:  Rash had started after camping.  Recent travel: See above.    Therapies tried and outcome: Has been using the Triamcinolone cream.  She has not started the Acyclovir yet.              Reviewed and updated as needed this visit by Provider  Tobacco  Allergies  Meds  Problems  Med Hx  Surg Hx  Fam Hx         Review of Systems   ROS COMP: Constitutional, HEENT, cardiovascular, pulmonary, gi and gu systems are negative, except as otherwise noted.      Objective    /74   Pulse 74   Temp 98.1  F (36.7  C) (Tympanic)   Resp 20   Ht 1.549 m (5' 1\")   Wt 72.6 kg (160 lb)   LMP 04/06/2016   SpO2 96%   BMI 30.23 kg/m    Body mass index is 30.23 kg/m .  Physical Exam   GENERAL: healthy, alert and no distress  SKIN: right lower leg, lateral aspect - cluster of vesicles is drying out - no new lesions.            Assessment & Plan       ICD-10-CM    1. Rash R21      Rash is likely shingles.  Advised to finish full course of acyclovir.  Viral culture is still pending.    Handout for pain exercises given.    Patient should return in 6-12 months for the shingles vaccine.        Return in about 2 weeks (around 8/7/2019), or if symptoms worsen or fail to improve.    Soledad" REDD Bauman [facility]

## 2019-07-24 NOTE — PATIENT INSTRUCTIONS
Thank you for choosing [facility].  You may be receiving an email and/or telephone survey request from [facility] Customer Experience regarding your visit today.  Please take a few minutes to respond to the survey to let us know how we are doing.      If you have questions or concerns, please contact us via [employer] or you can contact your care team at [phone].    Our Clinic hours are:  Monday 6:40 am  to 7:00 pm  Tuesday -Friday 6:40 am to 5:00 pm    The [place] outpatient lab hours are:  Monday - Friday 6:10 am to 4:45 pm  Saturdays 7:00 am to 11:00 am  Appointments are required, call [phone]    If you have clinical questions after hours or would like to schedule an appointment,  call the clinic at [phone].

## 2019-07-25 LAB
SPECIMEN SOURCE: ABNORMAL
VIRUS SPEC CULT: ABNORMAL
VIRUS SPEC CULT: ABNORMAL

## 2019-07-30 ENCOUNTER — TELEPHONE (OUTPATIENT)
Dept: FAMILY MEDICINE | Facility: CLINIC | Age: 54
End: 2019-07-30

## 2019-07-30 NOTE — TELEPHONE ENCOUNTER
Testing positive for varicella zoster virus signed and faxed back.  Debbi Wilson on 7/30/2019 at 1:21 PM     (2) chairfast

## 2019-09-12 ENCOUNTER — OFFICE VISIT (OUTPATIENT)
Dept: DERMATOLOGY | Facility: CLINIC | Age: 54
End: 2019-09-12
Payer: COMMERCIAL

## 2019-09-12 VITALS — HEART RATE: 70 BPM | OXYGEN SATURATION: 96 % | DIASTOLIC BLOOD PRESSURE: 71 MMHG | SYSTOLIC BLOOD PRESSURE: 119 MMHG

## 2019-09-12 DIAGNOSIS — L82.1 SEBORRHEIC KERATOSIS: ICD-10-CM

## 2019-09-12 DIAGNOSIS — Z86.018 HISTORY OF DYSPLASTIC NEVUS: ICD-10-CM

## 2019-09-12 DIAGNOSIS — D18.01 ANGIOMA OF SKIN: ICD-10-CM

## 2019-09-12 DIAGNOSIS — D22.9 NEVUS: Primary | ICD-10-CM

## 2019-09-12 DIAGNOSIS — L81.4 LENTIGO: ICD-10-CM

## 2019-09-12 PROCEDURE — 99214 OFFICE O/P EST MOD 30 MIN: CPT | Performed by: PHYSICIAN ASSISTANT

## 2019-09-12 NOTE — PROGRESS NOTES
HPI:   Chief complaints: Rose Mary Rose is a 54 year old female who presents for Full skin cancer screening to rule out skin cancer   Last Skin Exam: 2 years ago      1st Baseline: no  Personal HX of Skin Cancer: no   Personal HX of Malignant Melanoma: no   Family HX of Skin Cancer / Malignant Melanoma: no  Personal HX of Atypical Moles:   Yes history of a severely dysplastic nevus on the back  Risk factors: history of sun tanning; sun exposure  New / Changing lesions:no  Social History: camps frequently during the summer months  On review of systems, there are no further skin complaints, patient is feeling otherwise well.  See patient intake sheet.  ROS of the following were done and are negative: Constitutional, Eyes, Ears, Nose,   Mouth, Throat, Cardiovascular, Respiratory, GI, Genitourinary, Musculoskeletal,   Psychiatric, Endocrine, Allergic/Immunologic.    PHYSICAL EXAM:   /71   Pulse 70   LMP 04/06/2016   SpO2 96%   Skin exam performed as follows: Type 2 skin. Mood appropriate  Alert and Oriented X 3. Well developed, well nourished in no distress.  General appearance: Normal  Head including face: Normal  Eyes: conjunctiva and lids: Normal  Mouth: Lips, teeth, gums: Normal  Neck: Normal  Chest-breast/axillae: Normal  Back: Normal  Spleen and liver: Normal  Cardiovascular: Exam of peripheral vascular system by observation for swelling, varicosities, edema: Normal  Genitalia: groin, buttocks: Normal  Extremities: digits/nails (clubbing): Normal  Eccrine and Apocrine glands: Normal  Right upper extremity: Normal  Left upper extremity: Normal  Right lower extremity: Normal  Left lower extremity: Normal  Skin: Scalp and body hair: See below    Pt deferred exam of breasts, groin, buttocks: No    Other physical findings:  1. Multiple pigmented macules on extremities and trunk  2. Multiple pigmented macules on face, trunk and extremities  3. Multiple vascular papules on trunk, arms and legs  4. Multiple  scattered keratotic plaques       Except as noted above, no other signs of skin cancer or melanoma.     ASSESSMENT/PLAN:   Benign Full skin cancer screening today. . Patient with history of a severely dysplastic nevus in 2016  Advised on monthly self exams and 1 year  Patient Education: Appropriate brochures given.    1. Multiple benign appearing nevi on arms, legs and trunk. Discussed ABCDEs of melanoma and sunscreen.   2. Multiple lentigos on arms, legs and trunk. Advised benign, no treatment needed.  3. Multiple scattered angiomas. Advised benign, no treatment needed.   4. Seborrheic keratosis on arms, legs and trunk. Advised benign, no treatment needed.              Follow-up: yearly FSE/PRN sooner    1.) Patient was asked about new and changing moles. YES  2.) Patient received a complete physical skin examination: YES  3.) Patient was counseled to perform a monthly self skin examination: YES  Scribed By: Sierra Bazan, MS, PAJOHN

## 2019-09-12 NOTE — NURSING NOTE
Chief Complaint   Patient presents with     Skin Check       Vitals:    09/12/19 1600   BP: 119/71   Pulse: 70   SpO2: 96%     Wt Readings from Last 1 Encounters:   07/24/19 72.6 kg (160 lb)       Humaira Beebe LPN.................9/12/2019

## 2019-09-12 NOTE — LETTER
9/12/2019         RE: Rose Mary Rose  [address] [address]        Dear Colleague,    Thank you for referring your patient, Rose Mary Rose, to the [facility]. Please see a copy of my visit note below.    HPI:   Chief complaints: Rose Mary Rose is a 54 year old female who presents for Full skin cancer screening to rule out skin cancer   Last Skin Exam: 2 years ago      1st Baseline: no  Personal HX of Skin Cancer: no   Personal HX of Malignant Melanoma: no   Family HX of Skin Cancer / Malignant Melanoma: no  Personal HX of Atypical Moles:   Yes history of a severely dysplastic nevus on the back  Risk factors: history of sun tanning; sun exposure  New / Changing lesions:no  Social History: camps frequently during the summer months  On review of systems, there are no further skin complaints, patient is feeling otherwise well.  See patient intake sheet.  ROS of the following were done and are negative: Constitutional, Eyes, Ears, Nose,   Mouth, Throat, Cardiovascular, Respiratory, GI, Genitourinary, Musculoskeletal,   Psychiatric, Endocrine, Allergic/Immunologic.    PHYSICAL EXAM:   /71   Pulse 70   LMP 04/06/2016   SpO2 96%   Skin exam performed as follows: Type 2 skin. Mood appropriate  Alert and Oriented X 3. Well developed, well nourished in no distress.  General appearance: Normal  Head including face: Normal  Eyes: conjunctiva and lids: Normal  Mouth: Lips, teeth, gums: Normal  Neck: Normal  Chest-breast/axillae: Normal  Back: Normal  Spleen and liver: Normal  Cardiovascular: Exam of peripheral vascular system by observation for swelling, varicosities, edema: Normal  Genitalia: groin, buttocks: Normal  Extremities: digits/nails (clubbing): Normal  Eccrine and Apocrine glands: Normal  Right upper extremity: Normal  Left upper extremity: Normal  Right lower extremity: Normal  Left lower extremity: Normal  Skin: Scalp and body hair: See below    Pt deferred  exam of breasts, groin, buttocks: No    Other physical findings:  1. Multiple pigmented macules on extremities and trunk  2. Multiple pigmented macules on face, trunk and extremities  3. Multiple vascular papules on trunk, arms and legs  4. Multiple scattered keratotic plaques       Except as noted above, no other signs of skin cancer or melanoma.     ASSESSMENT/PLAN:   Benign Full skin cancer screening today. . Patient with history of a severely dysplastic nevus in 2016  Advised on monthly self exams and 1 year  Patient Education: Appropriate brochures given.    1. Multiple benign appearing nevi on arms, legs and trunk. Discussed ABCDEs of melanoma and sunscreen.   2. Multiple lentigos on arms, legs and trunk. Advised benign, no treatment needed.  3. Multiple scattered angiomas. Advised benign, no treatment needed.   4. Seborrheic keratosis on arms, legs and trunk. Advised benign, no treatment needed.              Follow-up: yearly FSE/PRN sooner    1.) Patient was asked about new and changing moles. YES  2.) Patient received a complete physical skin examination: YES  3.) Patient was counseled to perform a monthly self skin examination: YES  Scribed By: Sierra Bazan, MS, PAJOHN        Again, thank you for allowing me to participate in the care of your patient.        Sincerely,        Sierra Bazan PA-C

## 2019-09-13 ENCOUNTER — MYC REFILL (OUTPATIENT)
Dept: FAMILY MEDICINE | Facility: CLINIC | Age: 54
End: 2019-09-13

## 2019-09-13 DIAGNOSIS — I10 ESSENTIAL HYPERTENSION WITH GOAL BLOOD PRESSURE LESS THAN 140/90: ICD-10-CM

## 2019-09-13 RX ORDER — LISINOPRIL 10 MG/1
10 TABLET ORAL DAILY
Qty: 90 TABLET | Refills: 3 | Status: CANCELLED | OUTPATIENT
Start: 2019-09-13

## 2019-09-13 NOTE — TELEPHONE ENCOUNTER
.  lisinopril (PRINIVIL/ZESTRIL) 10 MG tablet 90 tablet 3 6/3/2019  No   Sig - Route: Take 1 tablet (10 mg) by mouth daily - Oral   Sent to pharmacy as: lisinopril (PRINIVIL/ZESTRIL) 10 MG tablet   Class: E-Prescribe   Order: [id number]   E-Prescribing Status: Receipt confirmed by pharmacy (6/3/2019  8:50 AM CDT)     Duplicate.      NIECY HudsonN, RN

## 2019-10-01 ENCOUNTER — HOSPITAL ENCOUNTER (EMERGENCY)
Facility: CLINIC | Age: 54
Discharge: HOME | End: 2019-10-01
Attending: EMERGENCY MEDICINE | Admitting: EMERGENCY MEDICINE
Payer: COMMERCIAL

## 2019-10-01 VITALS
OXYGEN SATURATION: 96 % | SYSTOLIC BLOOD PRESSURE: 137 MMHG | BODY MASS INDEX: 30.61 KG/M2 | TEMPERATURE: 97.9 F | DIASTOLIC BLOOD PRESSURE: 73 MMHG | WEIGHT: 162 LBS | RESPIRATION RATE: 20 BRPM

## 2019-10-01 DIAGNOSIS — J45.21 MILD INTERMITTENT ASTHMA WITH EXACERBATION: ICD-10-CM

## 2019-10-01 DIAGNOSIS — R05.9 COUGH: ICD-10-CM

## 2019-10-01 PROCEDURE — 94640 AIRWAY INHALATION TREATMENT: CPT | Mod: 76

## 2019-10-01 PROCEDURE — 40000275 ZZH STATISTIC RCP TIME EA 10 MIN

## 2019-10-01 PROCEDURE — 99284 EMERGENCY DEPT VISIT MOD MDM: CPT | Mod: 25 | Performed by: EMERGENCY MEDICINE

## 2019-10-01 PROCEDURE — 25000131 ZZH RX MED GY IP 250 OP 636 PS 637: Performed by: EMERGENCY MEDICINE

## 2019-10-01 PROCEDURE — 25000125 ZZHC RX 250: Performed by: EMERGENCY MEDICINE

## 2019-10-01 PROCEDURE — 94640 AIRWAY INHALATION TREATMENT: CPT

## 2019-10-01 PROCEDURE — 99284 EMERGENCY DEPT VISIT MOD MDM: CPT | Mod: Z6 | Performed by: EMERGENCY MEDICINE

## 2019-10-01 RX ORDER — IPRATROPIUM BROMIDE AND ALBUTEROL SULFATE 2.5; .5 MG/3ML; MG/3ML
3 SOLUTION RESPIRATORY (INHALATION) ONCE
Status: COMPLETED | OUTPATIENT
Start: 2019-10-01 | End: 2019-10-01

## 2019-10-01 RX ORDER — PREDNISONE 20 MG/1
40 TABLET ORAL DAILY
Qty: 8 TABLET | Refills: 0 | Status: SHIPPED | OUTPATIENT
Start: 2019-10-01 | End: 2019-12-17

## 2019-10-01 RX ORDER — PREDNISONE 20 MG/1
40 TABLET ORAL ONCE
Status: COMPLETED | OUTPATIENT
Start: 2019-10-01 | End: 2019-10-01

## 2019-10-01 RX ADMIN — IPRATROPIUM BROMIDE AND ALBUTEROL SULFATE 3 ML: .5; 3 SOLUTION RESPIRATORY (INHALATION) at 21:44

## 2019-10-01 RX ADMIN — IPRATROPIUM BROMIDE AND ALBUTEROL SULFATE 3 ML: .5; 3 SOLUTION RESPIRATORY (INHALATION) at 22:15

## 2019-10-01 RX ADMIN — PREDNISONE 40 MG: 20 TABLET ORAL at 21:42

## 2019-10-01 NOTE — ED AVS SNAPSHOT
[facility] Emergency Department  [address] [address]  Phone:  [phone]  Fax:  [phone]                                    Rose Mary Rose   MRN: [id number]    Department:  [facility] Emergency Department   Date of Visit:  10/1/2019           After Visit Summary Signature Page    I have received my discharge instructions, and my questions have been answered. I have discussed any challenges I see with this plan with the nurse or doctor.    ..........................................................................................................................................  Patient/Patient Representative Signature      ..........................................................................................................................................  Patient Representative Print Name and Relationship to Patient    ..................................................               ................................................  Date                                   Time    ..........................................................................................................................................  Reviewed by Signature/Title    ...................................................              ..............................................  Date                                               Time          22EPIC Rev 08/18

## 2019-10-02 ENCOUNTER — HEALTH MAINTENANCE LETTER (OUTPATIENT)
Age: 54
End: 2019-10-02

## 2019-10-02 NOTE — DISCHARGE INSTRUCTIONS
Please keep your existing appointment with your primary care provider this coming Thursday.  Take prednisone as prescribed.  Please return to the emergency department immediately for any new or concerning symptoms or if your breathing worsens.

## 2019-10-02 NOTE — ED PROVIDER NOTES
History     Chief Complaint   Patient presents with     Cough     cough pt has asthma no relief from home nebs     HPI  Rose Mary Rose is a 54 year old female with history of asthma, hypertension, who presents with 1 day of cough and shortness of breath.  Patient reports this feels like an asthma exacerbation.  She has been getting temporary relief from her albuterol nebulizer treatment.  Says symptoms improved for about 2 hours.  Denies any hospitalizations for asthma exacerbations.  Triggers are respiratory infections and change in the weather.  Says she has been around a lot of people with colds at work.  Endorses  nasal congestion.  Denies any fever, chills, nausea, vomiting, abdominal pain, chest pain.  Current smoker.    The patient's PMHx, Surgical Hx, Allergies, and Medications were all reviewed with the patient.    Allergies:  Allergies   Allergen Reactions     Penicillins Rash       Problem List:    Patient Active Problem List    Diagnosis Date Noted     Advanced directives, counseling/discussion 04/20/2018     Priority: Medium     Advance Care Planning 4/20/2018: ACP Review of Chart / Resources Provided:  Reviewed chart for advance care plan.  Rose Mary Rose has been provided information and resources to begin or update their advance care plan.  Added by Rachelle Buchanan             Acquired hypothyroidism 10/31/2017     Priority: Medium     HTN (hypertension) 03/31/2014     Priority: Medium     NAFLD (nonalcoholic fatty liver disease) 03/27/2014     Priority: Medium     Prediabetes 08/31/2012     Priority: Medium     Moderate persistent asthma 04/25/2012     Priority: Medium     Family history of diabetes mellitus 04/02/2012     Priority: Medium     GERD (gastroesophageal reflux disease) 04/02/2012     Priority: Medium     Zantac not effective       CARDIOVASCULAR SCREENING; LDL GOAL LESS THAN 130 03/19/2012     Priority: Medium     Family history of colon cancer 09/08/2009     Priority: Medium      Enlarged thyroid with 2mm cyst  11/01/2007     Priority: Medium     Sono shows mildly enlarged thryoid with 2mm cyst right lobe. TSH wnl. Sono from 1/2002 showed prominence of thyroid and no cyst.       Obesity 08/21/2007     Priority: Medium     Problem list name updated by automated process. Provider to review          Past Medical History:    Past Medical History:   Diagnosis Date     Abnormal Papanicolaou smear of cervix and cervical HPV 2003     Advanced directives, counseling/discussion 4/20/2018     Eczema      Human papillomavirus in conditions classified elsewhere and of unspecified site      Skin cancer        Past Surgical History:    Past Surgical History:   Procedure Laterality Date     COLONOSCOPY  12/3/2012    Procedure: COLONOSCOPY;  Colonoscopy;  Surgeon: Darnell Siddiqui MD;  Location: [facility]     COLONOSCOPY N/A 6/4/2018    Procedure: COLONOSCOPY;  colonoscopy;  Surgeon: Nakul Antonio MD;  Location: [facility]     ESOPHAGOSCOPY, GASTROSCOPY, DUODENOSCOPY (EGD), COMBINED N/A 5/5/2017    Procedure: COMBINED ESOPHAGOSCOPY, GASTROSCOPY, DUODENOSCOPY (EGD), BIOPSY SINGLE OR MULTIPLE;  Gastroscopy;  Surgeon: Yonny Zambrano MD;  Location: [facility]     NO HISTORY OF SURGERY  8/2007       Family History:    Family History   Problem Relation Age of Onset     Hypertension Mother      Diabetes Mother      C.A.D. Maternal Grandmother      Hypertension Maternal Grandmother      Cerebrovascular Disease Maternal Grandmother      Cancer Paternal Grandfather         ? type     Diabetes Paternal Grandfather         Adult onset     Cancer Maternal Grandfather         ? renal     Diabetes Father      Hypertension Father      Cancer - colorectal Father      Coronary Artery Disease Father      Respiratory Daughter         asthma     Heart Disease Brother         double bypass     C.A.D. Brother      Cancer Brother         testicular ca     Hypertension Brother      Myocardial Infarction Brother      Breast Cancer No  family hx of        Social History:  Marital Status:   [4]  Social History     Tobacco Use     Smoking status: Light Tobacco Smoker     Packs/day: 0.25     Years: 25.00     Pack years: 6.25     Types: Cigarettes     Last attempt to quit: 3/14/2016     Years since quitting: 3.5     Smokeless tobacco: Never Used     Tobacco comment: 2-3 per week   Substance Use Topics     Alcohol use: No     Comment: 1 beer per month, usually none     Drug use: No        Medications:    albuterol (PROVENTIL) (2.5 MG/3ML) 0.083% neb solution  beclomethasone HFA (QVAR REDIHALER) 80 MCG/ACT inhaler  levothyroxine (SYNTHROID/LEVOTHROID) 25 MCG tablet  lisinopril (PRINIVIL/ZESTRIL) 10 MG tablet  loratadine (CLARITIN) 10 MG tablet  predniSONE (DELTASONE) 20 MG tablet  albuterol (PROAIR HFA/PROVENTIL HFA/VENTOLIN HFA) 108 (90 Base) MCG/ACT inhaler  ipratropium - albuterol 0.5 mg/2.5 mg/3 mL (DUONEB) 0.5-2.5 (3) MG/3ML neb solution          Review of Systems  A 10 point review of systems performed and is otherwise negative except as noted in the HPI.    Physical Exam   BP: 137/73  Heart Rate: 98  Temp: 97.9  F (36.6  C)  Resp: 20  Weight: 73.5 kg (162 lb)  SpO2: 95 %    Physical Exam  GEN: Awake, alert, and cooperative. No acute distress.  Nontoxic appearance  HENT: MMM. External ears and nose normal bilaterally.  EYES: EOM intact. Conjunctiva clear.   CV :Regular rate and rhythm.  Brisk capillary refill  PULM: Normal effort.  Expiratory wheeze in bilateral bases left greater than right.  No prolonged expiratory phase, good air movement.  ABD: Soft, non-tender, non-distended. No rebound or guarding.   NEURO: Normal speech. Following commands.  EXT: No gross deformity.  No lower extremity edema  INT: Warm. No diaphoresis. Normal color.        ED Course        Procedures           Critical Care time:  none               No results found for this or any previous visit (from the past 24 hour(s)).    Medications   ipratropium - albuterol  0.5 mg/2.5 mg/3 mL (DUONEB) neb solution 3 mL (3 mLs Nebulization Given 10/1/19 2144)   predniSONE (DELTASONE) tablet 40 mg (40 mg Oral Given 10/1/19 2142)   ipratropium - albuterol 0.5 mg/2.5 mg/3 mL (DUONEB) neb solution 3 mL (3 mLs Nebulization Given 10/1/19 2215)       Assessments & Plan (with Medical Decision Making)   54-year-old female with past medical history significant for asthma (no prior hospitals agents) who presents with 1 day of cough associated with shortness of breath.  On arrival to emergency department vital signs within normal limits and nontoxic appearance.  Physical exam notable for expiratory wheeze in bilateral bases, no rhonchi or rales appreciated.  SPO2 94% on room air and no fever, however low suspicion for pneumonia.  She states her symptoms seem consistent with her acute asthma exacerbations in the setting of.  Will give DuoNeb and prednisone and reevaluate.  On reexamination wheezes had decreased.  Given second DuoNeb with elimination of wheezing.  Patient reports breathing feels improved and she is comfortable going home.  Given lack of fever, response to therapy, and a productive cough, I have very low suspicion for pneumonia and will not plan for antibiotic treatment at this time.  She does have a previously scheduled point with her primary care provider in 2 days.  Encouraged her to keep this appointment and take prednisone once daily for next 4 days.  Prescription written.  Patient encouraged to quit smoking.  ED return precautions discussed.  Patient expresses agreement understanding plan.    I have reviewed the nursing notes.    I have reviewed the findings, diagnosis, plan and need for follow up with the patient.       Discharge Medication List as of 10/1/2019 11:13 PM      START taking these medications    Details   predniSONE (DELTASONE) 20 MG tablet Take 2 tablets (40 mg) by mouth daily for 4 days, Disp-8 tablet, R-0, E-Prescribe             Final diagnoses:   Cough   Mild  intermittent asthma with exacerbation     Stiven Goldberg MD    10/1/2019   [facility] EMERGENCY DEPARTMENT    Disclaimer: This note consists of words and symbols derived from keyboarding and dictation using voice recognition software.  As a result, there may be errors that have gone undetected.  Please consider this when interpreting information found in this note.     Stiven Goldberg MD  10/02/19 0100

## 2019-10-02 NOTE — ED NOTES
Productive cough and shortness of breath with activity since last night.  Has been using nebulizer x3 at home today.  Patient stated that she feels better for about 2 hours after neb.  Patient denies fever.  Patient has history of asthma.

## 2019-10-03 ENCOUNTER — OFFICE VISIT (OUTPATIENT)
Dept: FAMILY MEDICINE | Facility: CLINIC | Age: 54
End: 2019-10-03
Payer: COMMERCIAL

## 2019-10-03 VITALS
RESPIRATION RATE: 16 BRPM | HEIGHT: 61 IN | WEIGHT: 162 LBS | TEMPERATURE: 99 F | HEART RATE: 94 BPM | BODY MASS INDEX: 30.58 KG/M2 | OXYGEN SATURATION: 96 %

## 2019-10-03 DIAGNOSIS — J45.41 MODERATE PERSISTENT ASTHMA WITH EXACERBATION: Primary | ICD-10-CM

## 2019-10-03 PROCEDURE — 99214 OFFICE O/P EST MOD 30 MIN: CPT | Performed by: NURSE PRACTITIONER

## 2019-10-03 RX ORDER — DOXYCYCLINE HYCLATE 100 MG
100 TABLET ORAL 2 TIMES DAILY
Qty: 20 TABLET | Refills: 0 | Status: SHIPPED | OUTPATIENT
Start: 2019-10-03 | End: 2019-12-17

## 2019-10-03 RX ORDER — PREDNISONE 20 MG/1
TABLET ORAL
Qty: 8 TABLET | Refills: 0 | Status: SHIPPED | OUTPATIENT
Start: 2019-10-03 | End: 2019-12-17

## 2019-10-03 ASSESSMENT — MIFFLIN-ST. JEOR: SCORE: 1262.21

## 2019-10-03 NOTE — LETTER
"My Asthma Action Plan    Name: Rose Mary Rose   YOB: 1965  Date: 10/3/2019   My doctor: REDD Eubanks CNP   My clinic: [facility]        My Control Medicine: { :936116}  My Rescue Medicine: { :800672::\"Albuterol (Proair/Ventolin/Proventil HFA) 2-4 puffs EVERY 4 HOURS as needed. Use a spacer if recommended by your provider.\"}  {AAP include Oral Steroid (Optional) :430508} My Asthma Severity:   { :060739}  Know your asthma triggers: { :130361}  humidity  Patient is unaware of triggers            GREEN ZONE   Good Control    I feel good    No cough or wheeze    Can work, sleep and play without asthma symptoms       Take your asthma control medicine every day.     1. If exercise triggers your asthma, take your rescue medication    15 minutes before exercise or sports, and    During exercise if you have asthma symptoms  2. Spacer to use with inhaler: If you have a spacer, make sure to use it with your inhaler             YELLOW ZONE Getting Worse  I have ANY of these:    I do not feel good    Cough or wheeze    Chest feels tight    Wake up at night   1. Keep taking your Green Zone medications  2. Start taking your rescue medicine:    every 20 minutes for up to 1 hour. Then every 4 hours for 24-48 hours.  3. If you stay in the Yellow Zone for more than 12-24 hours, contact your doctor.  4. If you do not return to the Green Zone in 12-24 hours or you get worse, start taking your oral steroid medicine if prescribed by your provider.           RED ZONE Medical Alert - Get Help  I have ANY of these:    I feel awful    Medicine is not helping    Breathing getting harder    Trouble walking or talking    Nose opens wide to breathe       1. Take your rescue medicine NOW  2. If your provider has prescribed an oral steroid medicine, start taking it NOW  3. Call your doctor NOW  4. If you are still in the Red Zone after 20 minutes and you have not reached your doctor:    Take your rescue " medicine again and    Call 911 or go to the emergency room right away    See your regular doctor within 2 weeks of an Emergency Room or Urgent Care visit for follow-up treatment.          Annual Reminders:  Meet with Asthma Educator,  Flu Shot in the Fall, consider Pneumonia Vaccination for patients with asthma (aged 19 and older).    Pharmacy:    [id number] [place] [facility] - [place], [place] - [address] [facility] [place] [address] [facility] [place] [address] [facility]  [facility]) [facility] [place] - [address] [facility] [place]  [facility] - [place], [place] - [address]                          Asthma Triggers  How To Control Things That Make Your Asthma Worse    Triggers are things that make your asthma worse.  Look at the list below to help you find your triggers and what you can do about them.  You can help prevent asthma flare-ups by staying away from your triggers.      Trigger                                                          What you can do   Cigarette Smoke  Tobacco smoke can make asthma worse. Do not allow smoking in your home, car or around you.  Be sure no one smokes at a child s day care or school.  If you smoke, ask your health care provider for ways to help you quit.  Ask family members to quit too.  Ask your health care provider for a referral to Quit Plan to help you quit smoking, or call 1-805-065-PLAN.     Colds, Flu, Bronchitis  These are common triggers of asthma. Wash your hands often.  Don t touch your eyes, nose or mouth.  Get a flu shot every year.     Dust Mites  These are tiny bugs that live in cloth or carpet. They are too small to see. Wash sheets and blankets in hot water every week.   Encase pillows and mattress in dust mite proof covers.  Avoid having carpet if you can. If you have carpet, vacuum weekly.   Use a dust mask and HEPA vacuum.   Pollen and Outdoor Mold  Some people are allergic to  trees, grass, or weed pollen, or molds. Try to keep your windows closed.  Limit time out doors when pollen count is high.   Ask you health care provider about taking medicine during allergy season.     Animal Dander  Some people are allergic to skin flakes, urine or saliva from pets with fur or feathers. Keep pets with fur or feathers out of your home.    If you can t keep the pet outdoors, then keep the pet out of your bedroom.  Keep the bedroom door closed.  Keep pets off cloth furniture and away from stuffed toys.     Mice, Rats, and Cockroaches   Some people are allergic to the waste from these pests.   Cover food and garbage.  Clean up spills and food crumbs.  Store grease in the refrigerator.   Keep food out of the bedroom.   Indoor Mold  This can be a trigger if your home has high moisture. Fix leaking faucets, pipes, or other sources of water.   Clean moldy surfaces.  Dehumidify basement if it is damp and smelly.   Smoke, Strong Odors, and Sprays  These can reduce air quality. Stay away from strong odors and sprays, such as perfume, powder, hair spray, paints, smoke incense, paint, cleaning products, candles and new carpet.   Exercise or Sports  Some people with asthma have this trigger. Be active!  Ask your doctor about taking medicine before sports or exercise to prevent symptoms.    Warm up for 5-10 minutes before and after sports or exercise.     Other Triggers of Asthma  Cold air:  Cover your nose and mouth with a scarf.  Sometimes laughing or crying can be a trigger.  Some medicines and food can trigger asthma.

## 2019-10-03 NOTE — PROGRESS NOTES
"Subjective     Rose Mary Rose is a 54 year old female who presents to clinic today for the following health issues:    HPI   ED/UC Followup:    Facility:  [facility]  Date of visit: 10/1/10981  Reason for visit: cough- asthma exacerbation  Current Status: feels a little better , not as wheezy on the prednisone       ENT Symptoms             Symptoms: cc Present Absent Comment   Fever/Chills   x Hasn't checked   Fatigue  x     Muscle Aches  x  Pain around rib cage- muscle soreness from coughing   Eye Irritation       Sneezing       Nasal Kemal/Drg  x     Sinus Pressure/Pain   x    Loss of smell   x    Dental pain   x    Sore Throat   x    Swollen Glands       Ear Pain/Fullness   x    Cough x x  productive   Wheeze x x     Chest Pain   x    Shortness of breath  x     Rash       Other         Symptom duration:  4 days   Symptom severity:  moderate   Treatments tried:  ER visit,  Prednisone; albuterol and other asthma meds   Contacts:  co-workers               Reviewed and updated as needed this visit by Provider         Review of Systems   ROS COMP: Constitutional, HEENT, cardiovascular, pulmonary, gi and gu systems are negative, except as otherwise noted.      Objective    Pulse 94   Temp 99  F (37.2  C) (Tympanic)   Resp 16   Ht 1.549 m (5' 1\")   Wt 73.5 kg (162 lb)   LMP 04/06/2016   SpO2 96%   BMI 30.61 kg/m    Body mass index is 30.61 kg/m .  Physical Exam   GENERAL: healthy, alert and no distress  HENT: ear canals and TM's normal, nose and mouth without ulcers or lesions  NECK: no adenopathy, no asymmetry, masses, or scars and thyroid normal to palpation  RESP: lungs clear to auscultation - no rales, rhonchi or wheezes  CV: regular rate and rhythm, normal S1 S2, no S3 or S4, no murmur, click or rub, no peripheral edema and peripheral pulses strong            Assessment & Plan       ICD-10-CM    1. Moderate persistent asthma with exacerbation J45.41 doxycycline hyclate (VIBRA-TABS) 100 MG tablet     " predniSONE (DELTASONE) 20 MG tablet          Patient Instructions   Complete antibiotics as prescribed.    Complete prednisone taper as prescribed.    Continue asthma inhalers and nebs as you are.        Thank you for choosing [facility].  You may be receiving an email and/or telephone survey request from [facility] Customer Experience regarding your visit today.  Please take a few minutes to respond to the survey to let us know how we are doing.      If you have questions or concerns, please contact us via [employer] or you can contact your care team at [phone].    Our Clinic hours are:  Monday 6:40 am  to 7:00 pm  Tuesday -Friday 6:40 am to 5:00 pm    The [place] outpatient lab hours are:  Monday - Friday 6:10 am to 4:45 pm  Saturdays 7:00 am to 11:00 am  Appointments are required, call [phone]    If you have clinical questions after hours or would like to schedule an appointment,  call the clinic at [phone].        Return in about 1 week (around 10/10/2019), or if symptoms worsen or fail to improve.    The risks, benefits and treatment options of prescribed medications or other treatments have been discussed with the patient. The patient verbalized their understanding and should call or follow up if no improvement or if they develop further problems.    REDD Eubanks CNP  [facility]

## 2019-10-03 NOTE — PATIENT INSTRUCTIONS
Complete antibiotics as prescribed.    Complete prednisone taper as prescribed.    Continue asthma inhalers and nebs as you are.        Thank you for choosing [facility].  You may be receiving an email and/or telephone survey request from [facility] Customer Experience regarding your visit today.  Please take a few minutes to respond to the survey to let us know how we are doing.      If you have questions or concerns, please contact us via [employer] or you can contact your care team at [phone].    Our Clinic hours are:  Monday 6:40 am  to 7:00 pm  Tuesday -Friday 6:40 am to 5:00 pm    The [place] outpatient lab hours are:  Monday - Friday 6:10 am to 4:45 pm  Saturdays 7:00 am to 11:00 am  Appointments are required, call [phone]    If you have clinical questions after hours or would like to schedule an appointment,  call the clinic at [phone].

## 2019-10-30 ENCOUNTER — ALLIED HEALTH/NURSE VISIT (OUTPATIENT)
Dept: FAMILY MEDICINE | Facility: CLINIC | Age: 54
End: 2019-10-30
Payer: COMMERCIAL

## 2019-10-30 DIAGNOSIS — Z23 NEED FOR PROPHYLACTIC VACCINATION AND INOCULATION AGAINST INFLUENZA: Primary | ICD-10-CM

## 2019-10-30 PROCEDURE — 99207 ZZC NO CHARGE NURSE ONLY: CPT

## 2019-10-30 PROCEDURE — 90471 IMMUNIZATION ADMIN: CPT

## 2019-10-30 PROCEDURE — 90682 RIV4 VACC RECOMBINANT DNA IM: CPT

## 2019-12-06 ENCOUNTER — MYC REFILL (OUTPATIENT)
Dept: FAMILY MEDICINE | Facility: CLINIC | Age: 54
End: 2019-12-06

## 2019-12-06 DIAGNOSIS — J45.40 MODERATE PERSISTENT ASTHMA WITHOUT COMPLICATION: ICD-10-CM

## 2019-12-09 NOTE — TELEPHONE ENCOUNTER
"Requested Prescriptions   Pending Prescriptions Disp Refills     beclomethasone HFA (QVAR REDIHALER) 80 MCG/ACT inhaler 3 Inhaler 3     Sig: Inhale 2 puffs into the lungs 2 times daily       Inhaled Steroids Protocol Failed - 12/9/2019  1:58 PM        Failed - Asthma control assessment score within normal limits in last 6 months     Please review ACT score.   ACT Total Scores 8/17/2018 4/26/2019 6/3/2019   ACT TOTAL SCORE - - -   ASTHMA ER VISITS - - -   ASTHMA HOSPITALIZATIONS - - -   ACT TOTAL SCORE (Goal Greater than or Equal to 20) 9 15 19   In the past 12 months, how many times did you visit the emergency room for your asthma without being admitted to the hospital? 0 2 2   In the past 12 months, how many times were you hospitalized overnight because of your asthma? 0 0 0             Passed - Patient is age 12 or older        Passed - Medication is active on med list        Passed - Recent (6 mo) or future (30 days) visit within the authorizing provider's specialty     Patient had office visit in the last 6 months or has a visit in the next 30 days with authorizing provider or within the authorizing provider's specialty.  See \"Patient Info\" tab in inbasket, or \"Choose Columns\" in Meds & Orders section of the refill encounter.            Last Written Prescription Date:  6/3/2019  Last Fill Quantity: 3,  # refills: 3   Last office visit: 10/30/2019 with prescribing provider:  Merna   Future Office Visit:      "

## 2019-12-17 ENCOUNTER — OFFICE VISIT (OUTPATIENT)
Dept: FAMILY MEDICINE | Facility: CLINIC | Age: 54
End: 2019-12-17
Payer: COMMERCIAL

## 2019-12-17 VITALS
HEART RATE: 67 BPM | OXYGEN SATURATION: 98 % | SYSTOLIC BLOOD PRESSURE: 110 MMHG | DIASTOLIC BLOOD PRESSURE: 72 MMHG | TEMPERATURE: 98.7 F | HEIGHT: 61 IN | BODY MASS INDEX: 31.21 KG/M2 | RESPIRATION RATE: 16 BRPM | WEIGHT: 165.3 LBS

## 2019-12-17 DIAGNOSIS — J45.41 MODERATE PERSISTENT ASTHMA WITH ACUTE EXACERBATION: Primary | ICD-10-CM

## 2019-12-17 DIAGNOSIS — R05.9 COUGH: ICD-10-CM

## 2019-12-17 LAB
FLUAV+FLUBV AG SPEC QL: NEGATIVE
FLUAV+FLUBV AG SPEC QL: NEGATIVE
SPECIMEN SOURCE: NORMAL

## 2019-12-17 PROCEDURE — 99214 OFFICE O/P EST MOD 30 MIN: CPT | Performed by: NURSE PRACTITIONER

## 2019-12-17 PROCEDURE — 87804 INFLUENZA ASSAY W/OPTIC: CPT | Performed by: NURSE PRACTITIONER

## 2019-12-17 RX ORDER — PREDNISONE 20 MG/1
TABLET ORAL
Qty: 20 TABLET | Refills: 0 | Status: SHIPPED | OUTPATIENT
Start: 2019-12-17 | End: 2020-01-07

## 2019-12-17 RX ORDER — DOXYCYCLINE HYCLATE 100 MG
100 TABLET ORAL 2 TIMES DAILY
Qty: 20 TABLET | Refills: 0 | Status: SHIPPED | OUTPATIENT
Start: 2019-12-17 | End: 2020-01-14

## 2019-12-17 ASSESSMENT — MIFFLIN-ST. JEOR: SCORE: 1277.18

## 2019-12-17 NOTE — PROGRESS NOTES
"Subjective     Rose Mary Rose is a 54 year old female who presents to clinic today for the following health issues:  Chief Complaint   Patient presents with     URI     Acute Illness     Onset: x 3 days ago     Fever: no    Chills/Sweats: YES- sweats    Headache (location?): YES- back of head    Sinus Pressure:no    Conjunctivitis:  YES: left    Ear Pain: no    Rhinorrhea: no    Congestion: YES    Sore Throat: no     Cough: YES-productive of clear sputum    Wheeze: YES    Decreased Appetite: no    Nausea: no    Vomiting: no    Diarrhea:  no    Dysuria/Freq.: no    Fatigue/Achiness: YES    Sick/Strep Exposure: YES- co workers have same symptoms      Therapies Tried and outcome: Inhalers, Nebs every 4-5 hrs, Renata seltzer cold - helps suppress cough               Reviewed and updated as needed this visit by Provider  Tobacco  Allergies  Meds  Problems  Med Hx  Surg Hx  Fam Hx         Review of Systems   ROS COMP: Constitutional, HEENT, cardiovascular, pulmonary, gi and gu systems are negative, except as otherwise noted.      Objective    /72 (BP Location: Right arm, Patient Position: Chair, Cuff Size: Adult Regular)   Pulse 67   Temp 98.7  F (37.1  C) (Tympanic)   Resp 16   Ht 1.549 m (5' 1\")   Wt 75 kg (165 lb 4.8 oz)   LMP 04/06/2016   SpO2 98%   BMI 31.23 kg/m    Body mass index is 31.23 kg/m .  Physical Exam   GENERAL: healthy, alert and no distress  HENT: ear canals and TM's normal, nose and mouth without ulcers or lesions  NECK: no adenopathy, no asymmetry, masses, or scars and thyroid normal to palpation  RESP: lungs clear to auscultation - no rales, rhonchi or wheezes  CV: regular rate and rhythm, normal S1 S2, no S3 or S4, no murmur, click or rub, no peripheral edema and peripheral pulses strong    Diagnostic Test Results:  Results for orders placed or performed in visit on 12/17/19 (from the past 24 hour(s))   Influenza A/B antigen   Result Value Ref Range    Influenza A/B Agn " Specimen Nasal     Influenza A Negative NEG^Negative    Influenza B Negative NEG^Negative           Assessment & Plan       ICD-10-CM    1. Moderate persistent asthma with acute exacerbation J45.41 predniSONE (DELTASONE) 20 MG tablet     doxycycline hyclate (VIBRA-TABS) 100 MG tablet   2. Cough R05 Influenza A/B antigen           Return in about 1 week (around 12/24/2019), or if symptoms worsen or fail to improve.    The risks, benefits and treatment options of prescribed medications or other treatments have been discussed with the patient. The patient verbalized their understanding and should call or follow up if no improvement or if they develop further problems.    REDD Eubanks [facility]

## 2019-12-17 NOTE — PATIENT INSTRUCTIONS
Thank you for choosing [facility].  You may be receiving an email and/or telephone survey request from [facility] Customer Experience regarding your visit today.  Please take a few minutes to respond to the survey to let us know how we are doing.      If you have questions or concerns, please contact us via [employer] or you can contact your care team at [phone].    Our Clinic hours are:  Monday 6:40 am  to 7:00 pm  Tuesday -Friday 6:40 am to 5:00 pm    The [place] outpatient lab hours are:  Monday - Friday 6:10 am to 4:45 pm  Saturdays 7:00 am to 11:00 am  Appointments are required, call [phone]    If you have clinical questions after hours or would like to schedule an appointment,  call the clinic at [phone].

## 2020-01-07 ENCOUNTER — HOSPITAL ENCOUNTER (EMERGENCY)
Facility: CLINIC | Age: 55
Discharge: HOME | End: 2020-01-07
Attending: PHYSICIAN ASSISTANT | Admitting: PHYSICIAN ASSISTANT
Payer: COMMERCIAL

## 2020-01-07 ENCOUNTER — APPOINTMENT (OUTPATIENT)
Dept: GENERAL RADIOLOGY | Facility: CLINIC | Age: 55
End: 2020-01-07
Attending: PHYSICIAN ASSISTANT
Payer: COMMERCIAL

## 2020-01-07 VITALS
DIASTOLIC BLOOD PRESSURE: 79 MMHG | TEMPERATURE: 97.8 F | HEIGHT: 61 IN | RESPIRATION RATE: 18 BRPM | SYSTOLIC BLOOD PRESSURE: 135 MMHG | BODY MASS INDEX: 31.15 KG/M2 | OXYGEN SATURATION: 97 % | WEIGHT: 165 LBS

## 2020-01-07 DIAGNOSIS — J45.901 EXACERBATION OF ASTHMA, UNSPECIFIED ASTHMA SEVERITY, UNSPECIFIED WHETHER PERSISTENT: ICD-10-CM

## 2020-01-07 DIAGNOSIS — J06.9 VIRAL URI WITH COUGH: ICD-10-CM

## 2020-01-07 DIAGNOSIS — F17.210 CIGARETTE SMOKER: ICD-10-CM

## 2020-01-07 PROCEDURE — 25000125 ZZHC RX 250: Performed by: PHYSICIAN ASSISTANT

## 2020-01-07 PROCEDURE — 94640 AIRWAY INHALATION TREATMENT: CPT | Performed by: PHYSICIAN ASSISTANT

## 2020-01-07 PROCEDURE — G0463 HOSPITAL OUTPT CLINIC VISIT: HCPCS | Mod: 25 | Performed by: PHYSICIAN ASSISTANT

## 2020-01-07 PROCEDURE — 71046 X-RAY EXAM CHEST 2 VIEWS: CPT

## 2020-01-07 PROCEDURE — 99214 OFFICE O/P EST MOD 30 MIN: CPT | Mod: Z6 | Performed by: PHYSICIAN ASSISTANT

## 2020-01-07 RX ORDER — PREDNISONE 10 MG/1
TABLET ORAL
Qty: 40 TABLET | Refills: 0 | Status: SHIPPED | OUTPATIENT
Start: 2020-01-07 | End: 2020-06-08

## 2020-01-07 RX ORDER — ALBUTEROL SULFATE 0.83 MG/ML
2.5 SOLUTION RESPIRATORY (INHALATION) ONCE
Status: COMPLETED | OUTPATIENT
Start: 2020-01-07 | End: 2020-01-07

## 2020-01-07 RX ADMIN — ALBUTEROL SULFATE 2.5 MG: 2.5 SOLUTION RESPIRATORY (INHALATION) at 15:51

## 2020-01-07 ASSESSMENT — MIFFLIN-ST. JEOR: SCORE: 1275.82

## 2020-01-07 NOTE — ED AVS SNAPSHOT
[facility] Emergency Department  [address] [address]  Phone:  [phone]  Fax:  [phone]                                    Rose Mary Rose   MRN: [id number]    Department:  [facility] Emergency Department   Date of Visit:  1/7/2020           After Visit Summary Signature Page    I have received my discharge instructions, and my questions have been answered. I have discussed any challenges I see with this plan with the nurse or doctor.    ..........................................................................................................................................  Patient/Patient Representative Signature      ..........................................................................................................................................  Patient Representative Print Name and Relationship to Patient    ..................................................               ................................................  Date                                   Time    ..........................................................................................................................................  Reviewed by Signature/Title    ...................................................              ..............................................  Date                                               Time          22EPIC Rev 08/18

## 2020-01-07 NOTE — ED NOTES
Pt here with cough that started on Sunday, hx of asthma. Having to use inhaler more often and states it is not lasting as long as it normally does.

## 2020-01-07 NOTE — ED PROVIDER NOTES
History     Chief Complaint   Patient presents with     Cough     wheeze-hx asthma-nebs not helping as much      HPI  Rose Mary Rose is a 54 year old female who presents the urgent care with concern over illness which is been present for the last 3 days.  Patient states that she became ill on Sunday, 2 days prior to arrival.  She complains of sweats, chills, nasal congestion, sore throat, cough, shortness of breath and wheezing.  She has not had any objective fever, myalgias, ear pain, nausea, vomiting, diarrhea or abdominal symptoms.  She does have a history of asthma and has attempted to use her albuterol nebs, last dose of neb was approximately 2 hours prior to arrival.  She is a daily smoker however states that she quit at the new year one week ago and has been wearing nicotine patch.      Allergies:  Allergies   Allergen Reactions     Penicillins Rash     Problem List:    Patient Active Problem List    Diagnosis Date Noted     Advanced directives, counseling/discussion 04/20/2018     Priority: Medium     Advance Care Planning 4/20/2018: ACP Review of Chart / Resources Provided:  Reviewed chart for advance care plan.  Rose Mary Rose has been provided information and resources to begin or update their advance care plan.  Added by Rachelle Buchanan             Acquired hypothyroidism 10/31/2017     Priority: Medium     HTN (hypertension) 03/31/2014     Priority: Medium     NAFLD (nonalcoholic fatty liver disease) 03/27/2014     Priority: Medium     Prediabetes 08/31/2012     Priority: Medium     Moderate persistent asthma 04/25/2012     Priority: Medium     Family history of diabetes mellitus 04/02/2012     Priority: Medium     GERD (gastroesophageal reflux disease) 04/02/2012     Priority: Medium     Zantac not effective       CARDIOVASCULAR SCREENING; LDL GOAL LESS THAN 130 03/19/2012     Priority: Medium     Family history of colon cancer 09/08/2009     Priority: Medium     Enlarged thyroid with 2mm cyst   11/01/2007     Priority: Medium     Sono shows mildly enlarged thryoid with 2mm cyst right lobe. TSH wnl. Sono from 1/2002 showed prominence of thyroid and no cyst.       Obesity 08/21/2007     Priority: Medium     Problem list name updated by automated process. Provider to review        Past Medical History:    Past Medical History:   Diagnosis Date     Abnormal Papanicolaou smear of cervix and cervical HPV 2003     Advanced directives, counseling/discussion 4/20/2018     Eczema      Human papillomavirus in conditions classified elsewhere and of unspecified site      Skin cancer      Past Surgical History:    Past Surgical History:   Procedure Laterality Date     COLONOSCOPY  12/3/2012    Procedure: COLONOSCOPY;  Colonoscopy;  Surgeon: Darnell Siddiqui MD;  Location: [facility]     COLONOSCOPY N/A 6/4/2018    Procedure: COLONOSCOPY;  colonoscopy;  Surgeon: Nakul Antonio MD;  Location: [facility]     ESOPHAGOSCOPY, GASTROSCOPY, DUODENOSCOPY (EGD), COMBINED N/A 5/5/2017    Procedure: COMBINED ESOPHAGOSCOPY, GASTROSCOPY, DUODENOSCOPY (EGD), BIOPSY SINGLE OR MULTIPLE;  Gastroscopy;  Surgeon: Yonny Zambrano MD;  Location: [facility]     NO HISTORY OF SURGERY  8/2007     Family History:    Family History   Problem Relation Age of Onset     Hypertension Mother      Diabetes Mother      C.A.D. Maternal Grandmother      Hypertension Maternal Grandmother      Cerebrovascular Disease Maternal Grandmother      Cancer Paternal Grandfather         ? type     Diabetes Paternal Grandfather         Adult onset     Cancer Maternal Grandfather         ? renal     Diabetes Father      Hypertension Father      Cancer - colorectal Father      Coronary Artery Disease Father      Respiratory Daughter         asthma     Heart Disease Brother         double bypass     C.A.D. Brother      Cancer Brother         testicular ca     Hypertension Brother      Myocardial Infarction Brother      Breast Cancer No family hx of      Social History:  Marital  "Status:   [4]  Social History     Tobacco Use     Smoking status: Current Some Day Smoker     Packs/day: 0.25     Years: 25.00     Pack years: 6.25     Types: Cigarettes     Smokeless tobacco: Never Used     Tobacco comment: 2 ciggarettes per day as of 12/17/19   Substance Use Topics     Alcohol use: No     Comment: 1 beer per month, usually none     Drug use: No      Medications:    albuterol (PROAIR HFA/PROVENTIL HFA/VENTOLIN HFA) 108 (90 Base) MCG/ACT inhaler  albuterol (PROVENTIL) (2.5 MG/3ML) 0.083% neb solution  beclomethasone HFA (QVAR REDIHALER) 80 MCG/ACT inhaler  ipratropium - albuterol 0.5 mg/2.5 mg/3 mL (DUONEB) 0.5-2.5 (3) MG/3ML neb solution  levothyroxine (SYNTHROID/LEVOTHROID) 25 MCG tablet  lisinopril (PRINIVIL/ZESTRIL) 10 MG tablet  loratadine (CLARITIN) 10 MG tablet  predniSONE (DELTASONE) 20 MG tablet      Review of Systems  CONSTITUTIONAL:POSITIVE  for chills and sweats and NEGATIVE  for fever and myalgias  INTEGUMENTARY/SKIN: NEGATIVE for worrisome rashes, moles or lesions  EYES: NEGATIVE for vision changes or irritation  ENT/MOUTH: POSITIVE for nasal congestion, sore throat  and NEGATIVE for ear pain   RESP:POSITIVE for cough, shortness of breath, wheezing  GI: NEGATIVE for vomiting, diarrhea, abdominal pain  Physical Exam   BP: 135/79  Heart Rate: 68  Temp: 97.8  F (36.6  C)  Resp: 18  Height: 154.9 cm (5' 1\")  Weight: 74.8 kg (165 lb)  SpO2: 97 %  Physical Exam  GENERAL APPEARANCE: healthy, alert and no distress  EYES: EOMI,  PERRL, conjunctiva clear  HENT: ear canals and TM's normal.  Nose and mouth without ulcers, erythema or lesions  NECK: supple, nontender, no lymphadenopathy  RESP: Wheezing in the right lung, no crackles  CV: regular rates and rhythm, normal S1 S2, no murmur noted  SKIN: no suspicious lesions or rashes  ED Course        Procedures        Critical Care time:  none        No results found for any visits on 01/07/20.    Medications   albuterol (PROVENTIL) neb " solution 2.5 mg (has no administration in time range)     Results for orders placed or performed during the hospital encounter of 01/07/20   Chest XR,  PA & LAT    Narrative    CHEST TWO VIEWS  1/7/2020 3:49 PM     HISTORY:  Cough, wheezing; rule out pneumonia.    COMPARISON: Chest x-ray 1/22/2019.      Impression    IMPRESSION: PA and lateral views of the chest. Lungs are clear. Heart  is normal in size. No effusions are evident. No pneumothorax.    PATRICK SERRA MD     Assessments & Plan (with Medical Decision Making)     I have reviewed the nursing notes.    I have reviewed the findings, diagnosis, plan and need for follow up with the patient.       New Prescriptions    PREDNISONE (DELTASONE) 10 MG TABLET    6 tablets daily for three days, 4 tablets daily for 3 days, 2 tablets daily for 3 days,  1 tablet daily for 3 days,  half a tablet for 4 days.     Final diagnoses:   Viral URI with cough   Exacerbation of asthma, unspecified asthma severity, unspecified whether persistent     54 year old female presents to the  with concern over 3-day history of cough, sore throat, nasal congestion shortness of breath and wheezing.  She had stable vital signs upon arrival.  Physical exam findings as described above.  As part of evaluation she did have chest x-ray which was negative for acute infiltrate, pneumothorax, pleural effusion or change in cardiopulmonary vasculature.  Symptoms consistent with viral URI with asthma exacerbation.  Differential would also include possibility of influenza however given duration of symptoms will defer testing at this time.  Differential also include viral bronchitis.  I do not suspect pertussis, PE.  She is discharged home stable with prescription for prednisone.  Follow-up with primary care provider if no improvement within the next 3 days.  Worrisome reasons to return to the ER/UC sooner discussed.    Disclaimer: This note consists of symbols derived from keyboarding, dictation, and/or  voice recognition software. As a result, there may be errors in the script that have gone undetected.  Please consider this when interpreting information found in the chart.      1/7/2020   [facility] EMERGENCY DEPARTMENT     Subha Pérez PA-C  01/12/20 2050

## 2020-01-07 NOTE — LETTER
[facility] EMERGENCY DEPARTMENT  [address] [address]  Phone: [phone]  Fax: [phone]    January 7, 2020        Rose Mary Rose  [address] [address]          To whom it may concern:    RE: Rose Mary Rose    Zoraida was evaluated in the urgent care for an illness on 1/7/2020.      Please contact me for questions or concerns.      Sincerely,        Subha Pérez PA-C

## 2020-01-12 ENCOUNTER — MYC MEDICAL ADVICE (OUTPATIENT)
Dept: FAMILY MEDICINE | Facility: CLINIC | Age: 55
End: 2020-01-12

## 2020-01-13 NOTE — TELEPHONE ENCOUNTER
Left message for the patient to call the clinic.  Offer her an acute opening for [facility]. Raven ROSEN RN

## 2020-01-14 ENCOUNTER — OFFICE VISIT (OUTPATIENT)
Dept: FAMILY MEDICINE | Facility: CLINIC | Age: 55
End: 2020-01-14
Payer: COMMERCIAL

## 2020-01-14 VITALS
OXYGEN SATURATION: 97 % | TEMPERATURE: 98.4 F | DIASTOLIC BLOOD PRESSURE: 92 MMHG | RESPIRATION RATE: 20 BRPM | SYSTOLIC BLOOD PRESSURE: 144 MMHG | HEIGHT: 61 IN | WEIGHT: 167 LBS | BODY MASS INDEX: 31.53 KG/M2 | HEART RATE: 73 BPM

## 2020-01-14 DIAGNOSIS — J45.41 MODERATE PERSISTENT ASTHMA WITH ACUTE EXACERBATION: Primary | ICD-10-CM

## 2020-01-14 DIAGNOSIS — J45.40 MODERATE PERSISTENT ASTHMA WITHOUT COMPLICATION: ICD-10-CM

## 2020-01-14 PROCEDURE — 99214 OFFICE O/P EST MOD 30 MIN: CPT | Performed by: NURSE PRACTITIONER

## 2020-01-14 RX ORDER — BUDESONIDE AND FORMOTEROL FUMARATE DIHYDRATE 160; 4.5 UG/1; UG/1
2 AEROSOL RESPIRATORY (INHALATION) 2 TIMES DAILY
Qty: 3 INHALER | Refills: 3 | Status: SHIPPED | OUTPATIENT
Start: 2020-01-14 | End: 2020-01-17

## 2020-01-14 RX ORDER — DOXYCYCLINE HYCLATE 100 MG
100 TABLET ORAL 2 TIMES DAILY
Qty: 14 TABLET | Refills: 0 | Status: SHIPPED | OUTPATIENT
Start: 2020-01-14 | End: 2020-05-05

## 2020-01-14 ASSESSMENT — MIFFLIN-ST. JEOR: SCORE: 1284.89

## 2020-01-14 NOTE — PATIENT INSTRUCTIONS
Complete prednisone as prescribed  Complete antibiotics as prescribed.  Continue to neb.  Continue as needed albuterol inhaler.  Stop the Qvar.  Start Symbicort: 2 puffs twice daily.  Follow up in one week if no improvement.      Thank you for choosing [facility]  You may be receiving an email and/or telephone survey request from [facility] Customer Experience regarding your visit today.  Please take a few minutes to respond to the survey to let us know how we are doing.      If you have questions or concerns, please contact us via SeekSherpa or you can contact your care team at [phone].    Our Clinic hours are:  Monday 6:40 am  to 7:00 pm  Tuesday -Friday 6:40 am to 5:00 pm    The [place] outpatient lab hours are:  Monday - Friday 6:10 am to 4:45 pm  Saturdays 7:00 am to 11:00 am  Appointments are required, call [phone]    If you have clinical questions after hours or would like to schedule an appointment,  call the clinic at [phone].

## 2020-01-14 NOTE — PROGRESS NOTES
"Subjective     Rose Mary Rose is a 54 year old female who presents to clinic today for the following health issues:     Chief Complaint   Patient presents with     URI     URI x 1 week, seen in Urgent Care 1/7/2020        Acute Illness     Onset: 1 week ago     Fever: no    Chills/Sweats: YES- sweats    Headache (location?): YES- [place]    Sinus Pressure:no    Conjunctivitis:  no    Ear Pain: no    Rhinorrhea: YES    Congestion: YES    Sore Throat: no     Cough: YES-productive of clear sputum, productive of yellow sputum    Wheeze: YES    Decreased Appetite: no     Nausea: no    Vomiting: no    Diarrhea:  YES    Dysuria/Freq.: no    Fatigue/Achiness: YES    Sick/Strep Exposure: no     Therapies Tried and outcome: Nebulizers, Prednisone - some relief . Was seen 1/7/2020 in Urgent Care prescribed Prednisone- started tapering dose yesterday, is worried if she tapers it will worsen her symptoms.       Has been having more exacerbations lately.                Reviewed and updated as needed this visit by Provider         Review of Systems   ROS COMP: Constitutional, HEENT, cardiovascular, pulmonary, gi and gu systems are negative, except as otherwise noted.      Objective    BP (!) 144/92 (BP Location: Right arm, Patient Position: Chair, Cuff Size: Adult Regular)   Pulse 73   Temp 98.4  F (36.9  C) (Tympanic)   Resp 20   Ht 1.549 m (5' 1\")   Wt 75.8 kg (167 lb)   LMP 04/06/2016   SpO2 97%   Breastfeeding No   BMI 31.55 kg/m    Body mass index is 31.55 kg/m .  Physical Exam   GENERAL: healthy, alert and no distress  NECK: no adenopathy, no asymmetry, masses, or scars and thyroid normal to palpation  RESP: lungs clear to auscultation - no rales, rhonchi or wheezes  CV: regular rate and rhythm, normal S1 S2, no S3 or S4, no murmur, click or rub, no peripheral edema and peripheral pulses strong  ABDOMEN: soft, nontender, no hepatosplenomegaly, no masses and bowel sounds normal  MS: no gross musculoskeletal " defects noted, no edema            Assessment & Plan       ICD-10-CM    1. Moderate persistent asthma with acute exacerbation J45.41 doxycycline hyclate (VIBRA-TABS) 100 MG tablet   2. Moderate persistent asthma without complication J45.40 budesonide-formoterol (SYMBICORT) 160-4.5 MCG/ACT Inhaler     Patient Instructions   Complete prednisone as prescribed  Complete antibiotics as prescribed.  Continue to neb.  Continue as needed albuterol inhaler.  Stop the Qvar.  Start Symbicort: 2 puffs twice daily.  Follow up in one week if no improvement.      Thank you for choosing [facility]  You may be receiving an email and/or telephone survey request from [facility] Customer Experience regarding your visit today.  Please take a few minutes to respond to the survey to let us know how we are doing.      If you have questions or concerns, please contact us via Textingly or you can contact your care team at [phone].    Our Clinic hours are:  Monday 6:40 am  to 7:00 pm  Tuesday -Friday 6:40 am to 5:00 pm    The [place] outpatient lab hours are:  Monday - Friday 6:10 am to 4:45 pm  Saturdays 7:00 am to 11:00 am  Appointments are required, call [phone]    If you have clinical questions after hours or would like to schedule an appointment,  call the clinic at [phone].        Return in about 1 week (around 1/21/2020) for Asthma recheck.    The risks, benefits and treatment options of prescribed medications or other treatments have been discussed with the patient. The patient verbalized their understanding and should call or follow up if no improvement or if they develop further problems.    REDD Eubanks CNP  [facility] Emergency Department

## 2020-01-17 ENCOUNTER — MYC REFILL (OUTPATIENT)
Dept: FAMILY MEDICINE | Facility: CLINIC | Age: 55
End: 2020-01-17

## 2020-01-17 DIAGNOSIS — J45.40 MODERATE PERSISTENT ASTHMA WITHOUT COMPLICATION: ICD-10-CM

## 2020-01-17 NOTE — TELEPHONE ENCOUNTER
"Requested Prescriptions   Pending Prescriptions Disp Refills     albuterol (PROVENTIL) (2.5 MG/3ML) 0.083% neb solution [Pharmacy Med Name: Albuterol Sulfate (2.5 MG/3ML) 0.083% Nebulization Solution]  3     Sig: USE 1 VIAL VIA NEBULIZER EVERY FOUR HOURS AS NEEDED FOR SHORTNESS OF BREATH/ DYSPNEA       Asthma Maintenance Inhalers - Anticholinergics Failed - 1/17/2020  7:35 AM        Failed - Asthma control assessment score within normal limits in last 6 months     Please review ACT score.           Passed - Patient is age 12 years or older        Passed - Medication is active on med list        Passed - Recent (6 mo) or future (30 days) visit within the authorizing provider's specialty     Patient had office visit in the last 6 months or has a visit in the next 30 days with authorizing provider or within the authorizing provider's specialty.  See \"Patient Info\" tab in inbasket, or \"Choose Columns\" in Meds & Orders section of the refill encounter.       Last Written Prescription Date:  6/3/19  Last Fill Quantity: 3,  # refills: 3   Last office visit: 1/14/2020 with prescribing provider:  Merna   Future Office Visit:         ipratropium - albuterol 0.5 mg/2.5 mg/3 mL (DUONEB) 0.5-2.5 (3) MG/3ML neb solution [Pharmacy Med Name: Ipratropium-Albuterol 0.5-2.5 (3) MG/3ML Solution] 720 mL 5     Sig: USE 1 VIAL VIA NEBULIZER EVERY SIX HOURS AS NEEDED FOR SHORTNESS OF BREATH/ DYSPNEA OR WHEEZING       Short-Acting Beta Agonist Inhalers Protocol  Failed - 1/17/2020  7:35 AM        Failed - Asthma control assessment score within normal limits in last 6 months     Please review ACT score.           Passed - Patient is age 12 or older        Passed - Medication is active on med list        Passed - Recent (6 mo) or future (30 days) visit within the authorizing provider's specialty     Patient had office visit in the last 6 months or has a visit in the next 30 days with authorizing provider or within the authorizing provider's " "specialty.  See \"Patient Info\" tab in inbasket, or \"Choose Columns\" in Meds & Orders section of the refill encounter.            Last Written Prescription Date:  6/3/19  Last Fill Quantity: 1,  # refills: 3   Last office visit: 1/14/2020 with prescribing provider:  Merna   Future Office Visit:      "

## 2020-01-20 RX ORDER — ALBUTEROL SULFATE 90 UG/1
2 AEROSOL, METERED RESPIRATORY (INHALATION) EVERY 6 HOURS PRN
Qty: 18 G | Refills: 4 | Status: SHIPPED | OUTPATIENT
Start: 2020-01-20 | End: 2020-03-03

## 2020-01-20 RX ORDER — ALBUTEROL SULFATE 0.83 MG/ML
2.5 SOLUTION RESPIRATORY (INHALATION) EVERY 4 HOURS PRN
Qty: 3 BOX | Refills: 1 | Status: SHIPPED | OUTPATIENT
Start: 2020-01-20 | End: 2020-06-23

## 2020-01-20 RX ORDER — IPRATROPIUM BROMIDE AND ALBUTEROL SULFATE 2.5; .5 MG/3ML; MG/3ML
SOLUTION RESPIRATORY (INHALATION)
Qty: 720 ML | Refills: 5 | Status: SHIPPED | OUTPATIENT
Start: 2020-01-20 | End: 2020-03-03

## 2020-01-20 RX ORDER — BUDESONIDE AND FORMOTEROL FUMARATE DIHYDRATE 160; 4.5 UG/1; UG/1
2 AEROSOL RESPIRATORY (INHALATION) 2 TIMES DAILY
Qty: 3 INHALER | Refills: 2 | Status: SHIPPED | OUTPATIENT
Start: 2020-01-20 | End: 2020-09-30

## 2020-01-30 ENCOUNTER — OFFICE VISIT (OUTPATIENT)
Dept: DERMATOLOGY | Facility: CLINIC | Age: 55
End: 2020-01-30
Payer: COMMERCIAL

## 2020-01-30 VITALS
DIASTOLIC BLOOD PRESSURE: 75 MMHG | RESPIRATION RATE: 20 BRPM | SYSTOLIC BLOOD PRESSURE: 119 MMHG | TEMPERATURE: 97 F | HEART RATE: 73 BPM

## 2020-01-30 DIAGNOSIS — L30.1 DYSHIDROTIC ECZEMA: Primary | ICD-10-CM

## 2020-01-30 PROCEDURE — 99213 OFFICE O/P EST LOW 20 MIN: CPT | Performed by: PHYSICIAN ASSISTANT

## 2020-01-30 RX ORDER — CLOBETASOL PROPIONATE 0.5 MG/G
OINTMENT TOPICAL
Qty: 60 G | Refills: 5 | Status: SHIPPED | OUTPATIENT
Start: 2020-01-30 | End: 2021-06-23

## 2020-01-30 NOTE — PROGRESS NOTES
HPI:   Chief complaints: Rose Mary Rose is a 54 year old female who presents for evaluation of eczema on her hands. She has a history of dyshidrotic eczema; last flare was in 2016. Used clobetasol which was helpful.   Condition present for:  Many years on and off.   Previous treatments include: clobetasol cream    Review Of Systems  Eyes: negative  Ears/Nose/Throat: negative  Respiratory: No shortness of breath, dyspnea on exertion, cough, or hemoptysis  Cardiovascular: negative  Gastrointestinal: negative  Genitourinary: negative  Musculoskeletal: negative  Neurologic: negative  Psychiatric: negative  Skin: positive for rash    This document serves as a record of the services and decisions personally performed and made by Sierra Bazan, MS, PA-C. It was created on her behalf by Parul Cortez, a trained medical scribe. The creation of this document is based on the provider's statements to the medical scribe.  Parul Cortez 2:23 PM January 30, 2020    PHYSICAL EXAM:    /75 (BP Location: Right arm, Patient Position: Sitting, Cuff Size: Adult Regular)   Pulse 73   Temp 97  F (36.1  C) (Tympanic)   Resp 20   LMP 04/06/2016   Skin exam performed as follows: Type 2 skin. Mood appropriate  Alert and Oriented X 3. Well developed, well nourished in no distress.  General appearance: Normal  Head including face: Normal  Eyes: conjunctiva and lids: Normal  Mouth: Lips, teeth, gums: Normal  Neck: Normal  Chest-breast/axillae: Normal  Back: Normal  Spleen and liver: Normal  Cardiovascular: Exam of peripheral vascular system by observation for swelling, varicosities, edema: Normal  Genitalia: groin, buttocks: Normal  Extremities: digits/nails (clubbing): Normal  Eccrine and Apocrine glands: Normal  Right upper extremity: Normal  Left upper extremity: Normal  Right lower extremity: Normal  Left lower extremity: Normal  Skin: Scalp and body hair: See below    1. Tiny vesicles on the palms with peeling    ASSESSMENT/PLAN:      1. Dyshidrotic eczema - advised. Discussed topicals vs. NBUVB for treatment. She prefers to start with topicals for now; will consider NBUVB if refractory.   -start clobetasol ointment to hands BID x 2-3 weeks, then PRN only   -Consider NBUVB 2-3 x per week. She is a skin type II;  if she decides to start.       Follow-up: PRN  CC:   Scribed By: Parul Cortez Medical Scribe    The information in this document, created by the medical scribe for me, accurately reflects the services I personally performed and the decisions made by me. I have reviewed and approved this document for accuracy prior to leaving the patient care area.  January 30, 2020 2:34 PM       Sierra Bazan MS, PAKalinC

## 2020-01-30 NOTE — PROGRESS NOTES
"Initial /75 (BP Location: Right arm, Patient Position: Sitting, Cuff Size: Adult Regular)   Pulse 73   Temp 97  F (36.1  C) (Tympanic)   Resp 20   LMP 04/06/2016  Estimated body mass index is 31.55 kg/m  as calculated from the following:    Height as of 1/14/20: 1.549 m (5' 1\").    Weight as of 1/14/20: 75.8 kg (167 lb). .    Dai CHAHAL RN   Specialty Clinics   "

## 2020-01-30 NOTE — LETTER
"    1/30/2020         RE: Rose Mary Rose  [address] [facility] [address]        Dear Colleague,    Thank you for referring your patient, Rose Mary Rose, to the [facility]. Please see a copy of my visit note below.    Initial /75 (BP Location: Right arm, Patient Position: Sitting, Cuff Size: Adult Regular)   Pulse 73   Temp 97  F (36.1  C) (Tympanic)   Resp 20   LMP 04/06/2016  Estimated body mass index is 31.55 kg/m  as calculated from the following:    Height as of 1/14/20: 1.549 m (5' 1\").    Weight as of 1/14/20: 75.8 kg (167 lb). .    Dai CHAHAL RN   Specialty Clinics     HPI:   Chief complaints: Rose Mary Rose is a 54 year old female who presents for evaluation of eczema on her hands. She has a history of dyshidrotic eczema; last flare was in 2016. Used clobetasol which was helpful.   Condition present for:  Many years on and off.   Previous treatments include: clobetasol cream    Review Of Systems  Eyes: negative  Ears/Nose/Throat: negative  Respiratory: No shortness of breath, dyspnea on exertion, cough, or hemoptysis  Cardiovascular: negative  Gastrointestinal: negative  Genitourinary: negative  Musculoskeletal: negative  Neurologic: negative  Psychiatric: negative  Skin: positive for rash    This document serves as a record of the services and decisions personally performed and made by Sierra Bazan, MS, PA-C. It was created on her behalf by Parul Cortez, a trained medical scribe. The creation of this document is based on the provider's statements to the medical scribe.  Parul Cortez 2:23 PM January 30, 2020    PHYSICAL EXAM:    /75 (BP Location: Right arm, Patient Position: Sitting, Cuff Size: Adult Regular)   Pulse 73   Temp 97  F (36.1  C) (Tympanic)   Resp 20   LMP 04/06/2016   Skin exam performed as follows: Type 2 skin. Mood appropriate  Alert and Oriented X 3. Well developed, well nourished in no distress.  General appearance: Normal  Head including " face: Normal  Eyes: conjunctiva and lids: Normal  Mouth: Lips, teeth, gums: Normal  Neck: Normal  Chest-breast/axillae: Normal  Back: Normal  Spleen and liver: Normal  Cardiovascular: Exam of peripheral vascular system by observation for swelling, varicosities, edema: Normal  Genitalia: groin, buttocks: Normal  Extremities: digits/nails (clubbing): Normal  Eccrine and Apocrine glands: Normal  Right upper extremity: Normal  Left upper extremity: Normal  Right lower extremity: Normal  Left lower extremity: Normal  Skin: Scalp and body hair: See below    1. Tiny vesicles on the palms with peeling    ASSESSMENT/PLAN:     1. Dyshidrotic eczema - advised. Discussed topicals vs. NBUVB for treatment. She prefers to start with topicals for now; will consider NBUVB if refractory.   -start clobetasol ointment to hands BID x 2-3 weeks, then PRN only   -Consider NBUVB 2-3 x per week. She is a skin type II;  if she decides to start.       Follow-up: PRN  CC:   Scribed By: Parul Cortez Medical Scribe    The information in this document, created by the medical scribe for me, accurately reflects the services I personally performed and the decisions made by me. I have reviewed and approved this document for accuracy prior to leaving the patient care area.  January 30, 2020 2:34 PM       Sierra Bazan MS, PAJOHN          Again, thank you for allowing me to participate in the care of your patient.        Sincerely,        Sierra Bazan PA-C

## 2020-01-30 NOTE — PATIENT INSTRUCTIONS
Restart the clobetasol for your hands    If no better in a couple of weeks consider light therapy (narrow band UV-B). We have you do this 2-3 times per week as you are able.     Insurance codes:     56089 - procedure code  L30.1 - diagnosis code

## 2020-02-12 NOTE — ANESTHESIA PREPROCEDURE EVALUATION
Anesthesia Evaluation     . Pt has had prior anesthetic. Type: MAC    No history of anesthetic complications          ROS/MED HX    ENT/Pulmonary:     (+)allergic rhinitis, tobacco use, Current use Moderate Persistent asthma Treatment: Inhaler prn, Inhaler daily and Inhaled steroids,  , . .    Neurologic:  - neg neurologic ROS     Cardiovascular:     (+) Dyslipidemia, hypertension----. : . . . :. .       METS/Exercise Tolerance:  >4 METS   Hematologic:  - neg hematologic  ROS       Musculoskeletal:  - neg musculoskeletal ROS       GI/Hepatic:     (+) GERD Asymptomatic on medication, liver disease (fatty liver),       Renal/Genitourinary:  - ROS Renal section negative       Endo:     (+) thyroid problem hypothyroidism, Obesity, Other Endocrine Disorder prediabetes.      Psychiatric:  - neg psychiatric ROS       Infectious Disease:  - neg infectious disease ROS       Malignancy:      - no malignancy   Other:    - neg other ROS                 Physical Exam  Normal systems: cardiovascular, pulmonary and dental    Airway   Mallampati: I  TM distance: >3 FB  Neck ROM: full    Dental     Cardiovascular       Pulmonary                     Anesthesia Plan      History & Physical Review  History and physical reviewed and following examination; no interval change.    ASA Status:  3 .    NPO Status:  > 6 hours    Plan for MAC Reason for MAC:  Difficulty with conscious sedation (QS)         Postoperative Care      Consents  Anesthetic plan, risks, benefits and alternatives discussed with:  Patient and Other (See Comment) (SO)..                          .   Pt informed and verbalized understanding/kmp

## 2020-02-28 DIAGNOSIS — J45.40 MODERATE PERSISTENT ASTHMA WITHOUT COMPLICATION: ICD-10-CM

## 2020-02-28 NOTE — TELEPHONE ENCOUNTER
"Requested Prescriptions   Pending Prescriptions Disp Refills     albuterol (PROVENTIL) (2.5 MG/3ML) 0.083% neb solution [Pharmacy Med Name: Albuterol Sulfate (2.5 MG/3ML) 0.083% Nebulization Solution] 540 mL 11     Sig: USE 1 VIAL VIA NEBULIZER EVERY FOUR HOURS AS NEEDED FOR SHORTNESS OF BREATH / DYSPNEA       Asthma Maintenance Inhalers - Anticholinergics Failed - 2/28/2020 10:35 AM        Failed - Asthma control assessment score within normal limits in last 6 months     Please review ACT score.   ACT Total Scores 8/17/2018 4/26/2019 6/3/2019   ACT TOTAL SCORE - - -   ASTHMA ER VISITS - - -   ASTHMA HOSPITALIZATIONS - - -   ACT TOTAL SCORE (Goal Greater than or Equal to 20) 9 15 19   In the past 12 months, how many times did you visit the emergency room for your asthma without being admitted to the hospital? 0 2 2   In the past 12 months, how many times were you hospitalized overnight because of your asthma? 0 0 0             Passed - Patient is age 12 years or older        Passed - Medication is active on med list        Passed - Recent (6 mo) or future (30 days) visit within the authorizing provider's specialty     Patient had office visit in the last 6 months or has a visit in the next 30 days with authorizing provider or within the authorizing provider's specialty.  See \"Patient Info\" tab in inbasket, or \"Choose Columns\" in Meds & Orders section of the refill encounter.            Last Written Prescription Date:  1/20/2020  Last Fill Quantity: 3 box,  # refills: 1   Last office visit: 1/14/2020 with prescribing provider:  Merna   Future Office Visit:      "

## 2020-03-02 RX ORDER — ALBUTEROL SULFATE 0.83 MG/ML
SOLUTION RESPIRATORY (INHALATION)
Qty: 540 ML | Refills: 11 | OUTPATIENT
Start: 2020-03-02

## 2020-03-02 NOTE — TELEPHONE ENCOUNTER
albuterol (PROVENTIL) (2.5 MG/3ML) 0.083% neb solution 3 Box 1 1/20/2020  No   Sig - Route: Take 1 vial (2.5 mg) by nebulization every 4 hours as needed for shortness of breath / dyspnea - Nebulization   Sent to pharmacy as: albuterol (PROVENTIL) (2.5 MG/3ML) 0.083% neb solution   Class: E-Prescribe   Order: [id number]   E-Prescribing Status: Receipt confirmed by pharmacy (1/21/2020  5:52 AM CST)

## 2020-03-03 ENCOUNTER — MYC REFILL (OUTPATIENT)
Dept: FAMILY MEDICINE | Facility: CLINIC | Age: 55
End: 2020-03-03

## 2020-03-03 DIAGNOSIS — J45.40 MODERATE PERSISTENT ASTHMA WITHOUT COMPLICATION: ICD-10-CM

## 2020-03-04 RX ORDER — ALBUTEROL SULFATE 90 UG/1
2 AEROSOL, METERED RESPIRATORY (INHALATION) EVERY 6 HOURS PRN
Qty: 18 G | Refills: 4 | Status: SHIPPED | OUTPATIENT
Start: 2020-03-04 | End: 2020-10-21

## 2020-03-04 RX ORDER — IPRATROPIUM BROMIDE AND ALBUTEROL SULFATE 2.5; .5 MG/3ML; MG/3ML
1 SOLUTION RESPIRATORY (INHALATION) EVERY 6 HOURS PRN
Qty: 720 ML | Refills: 5 | Status: SHIPPED | OUTPATIENT
Start: 2020-03-04 | End: 2021-06-23

## 2020-05-05 ENCOUNTER — E-VISIT (OUTPATIENT)
Dept: FAMILY MEDICINE | Facility: CLINIC | Age: 55
End: 2020-05-05
Payer: COMMERCIAL

## 2020-05-05 DIAGNOSIS — J01.00 ACUTE NON-RECURRENT MAXILLARY SINUSITIS: ICD-10-CM

## 2020-05-05 DIAGNOSIS — J01.90 ACUTE SINUSITIS WITH SYMPTOMS > 10 DAYS: Primary | ICD-10-CM

## 2020-05-05 PROCEDURE — 99421 OL DIG E/M SVC 5-10 MIN: CPT | Performed by: NURSE PRACTITIONER

## 2020-05-05 RX ORDER — DOXYCYCLINE HYCLATE 100 MG
100 TABLET ORAL 2 TIMES DAILY
Qty: 14 TABLET | Refills: 0 | Status: SHIPPED | OUTPATIENT
Start: 2020-05-05 | End: 2020-06-08

## 2020-05-05 NOTE — PATIENT INSTRUCTIONS
Thank you for choosing us for your care. I have placed an order for a prescription so that you can start treatment. View your full visit summary for details by clicking on the link below. Your pharmacist will able to address any questions you may have about the medication.     If you're not feeling better within 5-7 days, please schedule an appointment.  You can schedule an appointment right here in [facility], or call [phone]  If the visit is for the same symptoms as your e-visit, we'll refund the cost of your e-visit if seen within seven days.    You may want to try a nasal lavage (also known as nasal irrigation). You can find over-the-counter products, such as Neti-Pot, at retail locations or make your own at home. Instructions for homemade nasal lavage and more information on the process are available online at http://www.aafp.org/afp/2009/1115/p1121.html.      Sinusitis (Antibiotic Treatment)    The sinuses are air-filled spaces within the bones of the face. They connect to the inside of the nose. Sinusitis is an inflammation of the tissue that lines the sinuses. Sinusitis can occur during a cold. It can also happen due to allergies to pollens and other particles in the air. Sinusitis can cause symptoms of sinus congestion and a feeling of fullness. A sinus infection causes fever, headache, and facial pain. There is often green or yellow fluid draining from the nose or into the back of the throat (post-nasal drip). You have been given antibiotics to treat this condition.  Home care    Take the full course of antibiotics as instructed. Do not stop taking them, even when you feel better.    Drink plenty of water, hot tea, and other liquids. This may help thin nasal mucus. It also may help your sinuses drain fluids.    Heat may help soothe painful areas of your face. Use a towel soaked in hot water. Or,  the shower and direct the warm spray onto your face. Using a vaporizer along with a menthol rub at  night may also help soothe symptoms.     An expectorant with guaifenesin may help thin nasal mucus and help your sinuses drain fluids.    You can use an over-the-counter decongestant, unless a similar medicine was prescribed to you. Nasal sprays work the fastest. Use one that contains phenylephrine or oxymetazoline. First blow your nose gently. Then use the spray. Do not use these medicines more often than directed on the label. If you do, your symptoms may get worse. You may also take pills that contain pseudoephedrine. Don t use products that combine multiple medicines. This is because side effects may be increased. Read labels. You can also ask the pharmacist for help. (People with high blood pressure should not use decongestants. They can raise blood pressure.)    Over-the-counter antihistamines may help if allergies contributed to your sinusitis.      Do not use nasal rinses or irrigation during an acute sinus infection, unless your healthcare provider tells you to. Rinsing may spread the infection to other areas in your sinuses.    Use acetaminophen or ibuprofen to control pain, unless another pain medicine was prescribed to you. If you have chronic liver or kidney disease or ever had a stomach ulcer, talk with your healthcare provider before using these medicines. (Aspirin should never be taken by anyone under age 18 who is ill with a fever. It may cause severe liver damage.)    Don't smoke. This can make symptoms worse.  Follow-up care  Follow up with your healthcare provider or our staff if you are not better in 1 week.  When to seek medical advice  Call your healthcare provider if any of these occur:    Facial pain or headache that gets worse    Stiff neck    Unusual drowsiness or confusion    Swelling of your forehead or eyelids    Vision problems, such as blurred or double vision    Fever of 100.4 F (38 C) or higher, or as directed by your healthcare provider    Seizure    Breathing problems    Symptoms  don't go away in 10 days  Prevention  Here are steps you can take to help prevent an infection:    Keep good hand washing habits.    Don t have close contact with people who have sore throats, colds, or other upper respiratory infections.    Don t smoke, and stay away from secondhand smoke.    Stay up to date with of your vaccines.  Date Last Reviewed: 11/1/2017 2000-2019 [employer]. [address] [place] [address], [place] [id number]. All rights reserved. This information is not intended as a substitute for professional medical care. Always follow your healthcare professional's instructions.

## 2020-05-26 DIAGNOSIS — E03.9 ACQUIRED HYPOTHYROIDISM: ICD-10-CM

## 2020-05-26 DIAGNOSIS — J45.40 MODERATE PERSISTENT ASTHMA WITHOUT COMPLICATION: ICD-10-CM

## 2020-05-26 RX ORDER — LEVOTHYROXINE SODIUM 25 UG/1
TABLET ORAL
Qty: 90 TABLET | Refills: 0 | Status: SHIPPED | OUTPATIENT
Start: 2020-05-26 | End: 2020-06-08

## 2020-05-26 RX ORDER — LORATADINE 10 MG/1
TABLET ORAL
Qty: 90 TABLET | Refills: 3 | Status: SHIPPED | OUTPATIENT
Start: 2020-05-26 | End: 2021-03-04

## 2020-05-26 NOTE — TELEPHONE ENCOUNTER
Claritin refilled for the year.  Levothyroxine refilled for 90 days - she is due for labs and office appointment.  Soledad Bauman, CNP

## 2020-05-27 ENCOUNTER — E-VISIT (OUTPATIENT)
Dept: FAMILY MEDICINE | Facility: CLINIC | Age: 55
End: 2020-05-27
Payer: COMMERCIAL

## 2020-05-27 DIAGNOSIS — R09.81 NASAL CONGESTION: Primary | ICD-10-CM

## 2020-05-27 PROCEDURE — 99421 OL DIG E/M SVC 5-10 MIN: CPT | Performed by: NURSE PRACTITIONER

## 2020-05-28 RX ORDER — FLUTICASONE PROPIONATE 50 MCG
1 SPRAY, SUSPENSION (ML) NASAL DAILY
Qty: 16 G | Refills: 1 | Status: SHIPPED | OUTPATIENT
Start: 2020-05-28 | End: 2020-08-10

## 2020-05-28 NOTE — PATIENT INSTRUCTIONS
Thank you for choosing us for your care. I have placed an order for a prescription so that you can start treatment. View your full visit summary for details by clicking on the link below. Your pharmacist will able to address any questions you may have about the medication.     If you're not feeling better within 5-7 days, please schedule an appointment.  You can schedule an appointment right here in [facility], or call [phone]  If the visit is for the same symptoms as your e-visit, we'll refund the cost of your e-visit if seen within seven days.    You may want to try a nasal lavage (also known as nasal irrigation). You can find over-the-counter products, such as Neti-Pot, at retail locations or make your own at home. Instructions for homemade nasal lavage and more information on the process are available online at http://www.aafp.org/afp/2009/1115/p1121.html.

## 2020-06-08 ENCOUNTER — OFFICE VISIT (OUTPATIENT)
Dept: FAMILY MEDICINE | Facility: CLINIC | Age: 55
End: 2020-06-08
Payer: COMMERCIAL

## 2020-06-08 VITALS
HEIGHT: 62 IN | TEMPERATURE: 97.9 F | BODY MASS INDEX: 31.08 KG/M2 | HEART RATE: 63 BPM | SYSTOLIC BLOOD PRESSURE: 120 MMHG | RESPIRATION RATE: 14 BRPM | DIASTOLIC BLOOD PRESSURE: 78 MMHG | OXYGEN SATURATION: 95 % | WEIGHT: 168.9 LBS

## 2020-06-08 DIAGNOSIS — E03.9 ACQUIRED HYPOTHYROIDISM: ICD-10-CM

## 2020-06-08 DIAGNOSIS — I10 ESSENTIAL HYPERTENSION WITH GOAL BLOOD PRESSURE LESS THAN 140/90: ICD-10-CM

## 2020-06-08 DIAGNOSIS — Z00.00 ROUTINE GENERAL MEDICAL EXAMINATION AT A HEALTH CARE FACILITY: Primary | ICD-10-CM

## 2020-06-08 LAB
ANION GAP SERPL CALCULATED.3IONS-SCNC: 9 MMOL/L (ref 3–14)
BUN SERPL-MCNC: 8 MG/DL (ref 7–30)
CALCIUM SERPL-MCNC: 9.3 MG/DL (ref 8.5–10.1)
CHLORIDE SERPL-SCNC: 108 MMOL/L (ref 94–109)
CHOLEST SERPL-MCNC: 232 MG/DL
CO2 SERPL-SCNC: 24 MMOL/L (ref 20–32)
CREAT SERPL-MCNC: 0.58 MG/DL (ref 0.52–1.04)
GFR SERPL CREATININE-BSD FRML MDRD: >90 ML/MIN/{1.73_M2}
GLUCOSE SERPL-MCNC: 130 MG/DL (ref 70–99)
HDLC SERPL-MCNC: 65 MG/DL
LDLC SERPL CALC-MCNC: 126 MG/DL
NONHDLC SERPL-MCNC: 167 MG/DL
POTASSIUM SERPL-SCNC: 4.1 MMOL/L (ref 3.4–5.3)
SODIUM SERPL-SCNC: 141 MMOL/L (ref 133–144)
TRIGL SERPL-MCNC: 206 MG/DL
TSH SERPL DL<=0.005 MIU/L-ACNC: 2.72 MU/L (ref 0.4–4)

## 2020-06-08 PROCEDURE — 80061 LIPID PANEL: CPT | Performed by: NURSE PRACTITIONER

## 2020-06-08 PROCEDURE — 36415 COLL VENOUS BLD VENIPUNCTURE: CPT | Performed by: NURSE PRACTITIONER

## 2020-06-08 PROCEDURE — 84443 ASSAY THYROID STIM HORMONE: CPT | Performed by: NURSE PRACTITIONER

## 2020-06-08 PROCEDURE — 99396 PREV VISIT EST AGE 40-64: CPT | Performed by: NURSE PRACTITIONER

## 2020-06-08 PROCEDURE — 99214 OFFICE O/P EST MOD 30 MIN: CPT | Mod: 25 | Performed by: NURSE PRACTITIONER

## 2020-06-08 PROCEDURE — 80048 BASIC METABOLIC PNL TOTAL CA: CPT | Performed by: NURSE PRACTITIONER

## 2020-06-08 RX ORDER — LISINOPRIL 10 MG/1
10 TABLET ORAL DAILY
Qty: 90 TABLET | Refills: 3 | Status: SHIPPED | OUTPATIENT
Start: 2020-06-08 | End: 2021-06-23

## 2020-06-08 RX ORDER — LEVOTHYROXINE SODIUM 25 UG/1
25 TABLET ORAL DAILY
Qty: 90 TABLET | Refills: 3 | Status: SHIPPED | OUTPATIENT
Start: 2020-06-08 | End: 2021-06-23

## 2020-06-08 ASSESSMENT — MIFFLIN-ST. JEOR: SCORE: 1296.44

## 2020-06-08 NOTE — PROGRESS NOTES
SUBJECTIVE:   CC: Rose Mary Rose is an 55 year old woman who presents for preventive health visit.     Healthy Habits:    Do you get at least three servings of calcium containing foods daily (dairy, green leafy vegetables, etc.)? no, taking calcium and/or vitamin D supplement: yes - multivitamin, and vitamin d    Amount of exercise or daily activities, outside of work: 4 day(s) per week, takes a walk    Problems taking medications regularly No    Medication side effects: No    Have you had an eye exam in the past two years? Yes, due    Do you see a dentist twice per year? yes    Do you have sleep apnea, excessive snoring or daytime drowsiness?no      Hypertension Follow-up      Do you check your blood pressure regularly outside of the clinic? No     Are you following a low salt diet? Yes    Are your blood pressures ever more than 140 on the top number (systolic) OR more   than 90 on the bottom number (diastolic), for example 140/90? No    Hypothyroidism Follow-up      Since last visit, patient describes the following symptoms: Weight stable, no hair loss, no skin changes, no constipation, no loose stools        Today's PHQ-2 Score:   PHQ-2 ( 1999 [employer]) 6/8/2020 1/14/2020   Q1: Little interest or pleasure in doing things 0 0   Q2: Feeling down, depressed or hopeless 1 0   PHQ-2 Score 1 0   Q1: Little interest or pleasure in doing things - -   Q2: Feeling down, depressed or hopeless - -   PHQ-2 Score - -       Abuse: Current or Past(Physical, Sexual or Emotional)- No  Do you feel safe in your environment? Yes        Social History     Tobacco Use     Smoking status: Light Tobacco Smoker     Packs/day: 0.25     Years: 25.00     Pack years: 6.25     Types: Cigarettes     Smokeless tobacco: Never Used     Tobacco comment: 2 ciggarettes per day as of 12/17/19   Substance Use Topics     Alcohol use: No     Comment: 1 beer per month, usually none     If you drink alcohol do you typically have >3 drinks per day or >7  "drinks per week? No                     Reviewed orders with patient.  Reviewed health maintenance and updated orders accordingly - Yes          Pertinent mammograms are reviewed under the imaging tab.    History of abnormal Pap smear: YES - updated in Problem List and Health Maintenance accordingly  PAP / HPV Latest Ref Rng & Units 6/5/2018 7/24/2015 8/31/2012   PAP - NIL NIL NIL   HPV 16 DNA NEG:Negative Negative - -   HPV 18 DNA NEG:Negative Negative - -   OTHER HR HPV NEG:Negative Negative - -     Reviewed and updated as needed this visit by clinical staff  Tobacco  Allergies  Meds  Med Hx  Surg Hx  Fam Hx  Soc Hx        Reviewed and updated as needed this visit by Provider            ROS:  CONSTITUTIONAL: NEGATIVE for fever, chills, change in weight  INTEGUMENTARY/SKIN: NEGATIVE for worrisome rashes, moles or lesions  EYES: NEGATIVE for vision changes or irritation  ENT: NEGATIVE for ear, mouth and throat problems  RESP: NEGATIVE for significant cough or SOB  BREAST: NEGATIVE for masses, tenderness or discharge  CV: NEGATIVE for chest pain, palpitations or peripheral edema  GI: NEGATIVE for nausea, abdominal pain, heartburn, or change in bowel habits  : NEGATIVE for unusual urinary or vaginal symptoms. No vaginal bleeding.  MUSCULOSKELETAL: NEGATIVE for significant arthralgias or myalgia  NEURO: NEGATIVE for weakness, dizziness or paresthesias  PSYCHIATRIC: NEGATIVE for changes in mood or affect     OBJECTIVE:   /78   Pulse 63   Temp 97.9  F (36.6  C) (Tympanic)   Resp 14   Ht 1.562 m (5' 1.5\")   Wt 76.6 kg (168 lb 14.4 oz)   LMP 04/06/2016   SpO2 95%   BMI 31.40 kg/m    EXAM:  GENERAL APPEARANCE: healthy, alert and no distress  EYES: Eyes grossly normal to inspection, PERRL and conjunctivae and sclerae normal  HENT: ear canals and TM's normal, nose and mouth without ulcers or lesions, oropharynx clear and oral mucous membranes moist  NECK: no adenopathy, no asymmetry, masses, or scars and " thyroid normal to palpation  RESP: lungs clear to auscultation - no rales, rhonchi or wheezes  BREAST: normal without masses, tenderness or nipple discharge and no palpable axillary masses or adenopathy  CV: regular rate and rhythm, normal S1 S2, no S3 or S4, no murmur, click or rub, no peripheral edema and peripheral pulses strong  ABDOMEN: soft, nontender, no hepatosplenomegaly, no masses and bowel sounds normal  MS: no musculoskeletal defects are noted and gait is age appropriate without ataxia  SKIN: no suspicious lesions or rashes  NEURO: Normal strength and tone, sensory exam grossly normal, mentation intact and speech normal  PSYCH: mentation appears normal and affect normal/bright    Diagnostic Test Results:  Results for orders placed or performed in visit on 06/08/20 (from the past 24 hour(s))   Basic metabolic panel  (Ca, Cl, CO2, Creat, Gluc, K, Na, BUN)   Result Value Ref Range    Sodium 141 133 - 144 mmol/L    Potassium 4.1 3.4 - 5.3 mmol/L    Chloride 108 94 - 109 mmol/L    Carbon Dioxide 24 20 - 32 mmol/L    Anion Gap 9 3 - 14 mmol/L    Glucose 130 (H) 70 - 99 mg/dL    Urea Nitrogen 8 7 - 30 mg/dL    Creatinine 0.58 0.52 - 1.04 mg/dL    GFR Estimate >90 >60 mL/min/[1.73_m2]    GFR Estimate If Black >90 >60 mL/min/[1.73_m2]    Calcium 9.3 8.5 - 10.1 mg/dL   TSH   Result Value Ref Range    TSH 2.72 0.40 - 4.00 mU/L   Lipid panel reflex to direct LDL Fasting   Result Value Ref Range    Cholesterol 232 (H) <200 mg/dL    Triglycerides 206 (H) <150 mg/dL    HDL Cholesterol 65 >49 mg/dL    LDL Cholesterol Calculated 126 (H) <100 mg/dL    Non HDL Cholesterol 167 (H) <130 mg/dL       ASSESSMENT/PLAN:       ICD-10-CM    1. Routine general medical examination at a health care facility    Z00.00 Lipid panel reflex to direct LDL Fasting   2. Acquired hypothyroidism  E03.9 Well controlled.  levothyroxine (SYNTHROID/LEVOTHROID) 25 MCG tablet     TSH     OFFICE/OUTPT VISIT,EST,LEVL III     3. Essential hypertension  "with goal blood pressure less than 140/90  I10 Well controlled.  lisinopril (ZESTRIL) 10 MG tablet     Basic metabolic panel  (Ca, Cl, CO2, Creat, Gluc, K, Na, BUN)     OFFICE/OUTPT VISIT,FATMATA PETERSON III       COUNSELING:   Reviewed preventive health counseling, as reflected in patient instructions    Estimated body mass index is 31.4 kg/m  as calculated from the following:    Height as of this encounter: 1.562 m (5' 1.5\").    Weight as of this encounter: 76.6 kg (168 lb 14.4 oz).    Weight management plan: Discussed healthy diet and exercise guidelines     reports that she has been smoking cigarettes. She has a 6.25 pack-year smoking history. She has never used smokeless tobacco.  Tobacco Cessation Action Plan: Information offered: Patient not interested at this time      The risks, benefits and treatment options of prescribed medications or other treatments have been discussed with the patient. The patient verbalized their understanding and should call or follow up if no improvement or if they develop further problems.    REDD Eubanks [facility]  "

## 2020-06-09 ASSESSMENT — ASTHMA QUESTIONNAIRES: ACT_TOTALSCORE: 22

## 2020-06-16 ENCOUNTER — VIRTUAL VISIT (OUTPATIENT)
Dept: FAMILY MEDICINE | Facility: CLINIC | Age: 55
End: 2020-06-16
Payer: COMMERCIAL

## 2020-06-16 DIAGNOSIS — F41.1 GENERALIZED ANXIETY DISORDER: Primary | ICD-10-CM

## 2020-06-16 PROCEDURE — 99214 OFFICE O/P EST MOD 30 MIN: CPT | Mod: 95 | Performed by: NURSE PRACTITIONER

## 2020-06-16 ASSESSMENT — PATIENT HEALTH QUESTIONNAIRE - PHQ9
SUM OF ALL RESPONSES TO PHQ QUESTIONS 1-9: 1
5. POOR APPETITE OR OVEREATING: SEVERAL DAYS

## 2020-06-16 ASSESSMENT — ANXIETY QUESTIONNAIRES
2. NOT BEING ABLE TO STOP OR CONTROL WORRYING: NOT AT ALL
1. FEELING NERVOUS, ANXIOUS, OR ON EDGE: SEVERAL DAYS
3. WORRYING TOO MUCH ABOUT DIFFERENT THINGS: NOT AT ALL
6. BECOMING EASILY ANNOYED OR IRRITABLE: NOT AT ALL
GAD7 TOTAL SCORE: 2
7. FEELING AFRAID AS IF SOMETHING AWFUL MIGHT HAPPEN: NOT AT ALL
IF YOU CHECKED OFF ANY PROBLEMS ON THIS QUESTIONNAIRE, HOW DIFFICULT HAVE THESE PROBLEMS MADE IT FOR YOU TO DO YOUR WORK, TAKE CARE OF THINGS AT HOME, OR GET ALONG WITH OTHER PEOPLE: NOT DIFFICULT AT ALL
5. BEING SO RESTLESS THAT IT IS HARD TO SIT STILL: NOT AT ALL

## 2020-06-16 NOTE — PROGRESS NOTES
"Rose Mary Rose is a 55 year old female who is being evaluated via a billable telephone visit.      The patient has been notified of following:     \"This telephone visit will be conducted via a call between you and your physician/provider. We have found that certain health care needs can be provided without the need for a physical exam.  This service lets us provide the care you need with a short phone conversation.  If a prescription is necessary we can send it directly to your pharmacy.  If lab work is needed we can place an order for that and you can then stop by our lab to have the test done at a later time.    Telephone visits are billed at different rates depending on your insurance coverage. During this emergency period, for some insurers they may be billed the same as an in-person visit.  Please reach out to your insurance provider with any questions.    If during the course of the call the physician/provider feels a telephone visit is not appropriate, you will not be charged for this service.\"    Patient has given verbal consent for Telephone visit?  Yes    What phone number would you like to be contacted at? [phone]    How would you like to obtain your AVS? MyChart    Subjective     Rose Mary Rose is a 55 year old female who presents via phone visit today for the following health issues:    HPI  Abnormal Mood Symptoms    Onset: x ongoing- worse since COVID. Patient has never been on anything before.     Description:   Depression: no  Anxiety: YES  Started crying at dentist due to feeling so anxious  Will cry at work - feels like people are against her.    Accompanying Signs & Symptoms:  Still participating in activities that you used to enjoy: YES  Fatigue: no  Irritability: no  Difficulty concentrating: no  Changes in appetite: no  Problems with sleep: YES- Worse at night when she is trying to go to bed. Thoughts just keep rolling in.  Heart racing/beating fast : no  Thoughts of hurting " yourself or others: none    History:   Recent stress: YES- COVID and not working right now.  Prior depression hospitalization: None  Family history of depression: no  Family history of anxiety: YES- mothers side    Precipitating factors:   Alcohol/drug use: no    Alleviating factors:  none    Therapies Tried and outcome: None               Reviewed and updated as needed this visit by Provider  Tobacco  Allergies  Meds  Problems  Med Hx  Surg Hx  Fam Hx         Review of Systems   Constitutional, HEENT, cardiovascular, pulmonary, gi and gu systems are negative, except as otherwise noted.       Objective   Reported vitals:  [facility] 04/06/2016    PSYCH: Alert and oriented times 3; coherent speech, normal   rate and volume, able to articulate logical thoughts, able   to abstract reason, no tangential thoughts, no hallucinations   or delusions   RESP: No cough, no audible wheezing, able to talk in full sentences  Remainder of exam unable to be completed due to telephone visits            Assessment/Plan:  1. Generalized anxiety disorder  New diagnosis  Start sertraline  Counseling offered - declined.  Follow up in one month, sooner if problems.  - sertraline (ZOLOFT) 50 MG tablet; Take 0.5 tablets (25 mg) by mouth daily for 7 days, THEN 1 tablet (50 mg) daily.  Dispense: 30 tablet; Refill: 1    Return in about 4 weeks (around 7/14/2020) for Depression/anxiety Recheck.      Phone call duration:  11 minutes    The risks, benefits and treatment options of prescribed medications or other treatments have been discussed with the patient. The patient verbalized their understanding and should call or follow up if no improvement or if they develop further problems.    REDD Eubanks CNP

## 2020-06-16 NOTE — PATIENT INSTRUCTIONS
Start sertraline for anxiety: 1/2 tablet daily for one week, then increase to 1 tablet daily.    Discussed risks/bennefits and possible side effects of antidepressants, including but not limited to GI upset, disrupted sleep, loss of libido, worsening of mood.  These medications often take 4 weeks to be effective.    Contact the clinic if having any adverse effects.  Do not stop the medication abruptly.      Follow up in one month or sooner if problems.

## 2020-06-17 ASSESSMENT — ANXIETY QUESTIONNAIRES: GAD7 TOTAL SCORE: 2

## 2020-06-23 DIAGNOSIS — J45.40 MODERATE PERSISTENT ASTHMA WITHOUT COMPLICATION: ICD-10-CM

## 2020-06-23 RX ORDER — ALBUTEROL SULFATE 0.83 MG/ML
SOLUTION RESPIRATORY (INHALATION)
Qty: 540 ML | Refills: 11 | Status: SHIPPED | OUTPATIENT
Start: 2020-06-23 | End: 2021-06-23

## 2020-08-10 ENCOUNTER — MYC REFILL (OUTPATIENT)
Dept: FAMILY MEDICINE | Facility: CLINIC | Age: 55
End: 2020-08-10

## 2020-08-10 DIAGNOSIS — E03.9 ACQUIRED HYPOTHYROIDISM: ICD-10-CM

## 2020-08-10 DIAGNOSIS — R09.81 NASAL CONGESTION: ICD-10-CM

## 2020-08-10 DIAGNOSIS — I10 ESSENTIAL HYPERTENSION WITH GOAL BLOOD PRESSURE LESS THAN 140/90: ICD-10-CM

## 2020-08-11 RX ORDER — LISINOPRIL 10 MG/1
10 TABLET ORAL DAILY
Qty: 90 TABLET | Refills: 3 | Status: CANCELLED | OUTPATIENT
Start: 2020-08-11

## 2020-08-11 RX ORDER — FLUTICASONE PROPIONATE 50 MCG
1 SPRAY, SUSPENSION (ML) NASAL DAILY
Qty: 16 G | Refills: 9 | Status: SHIPPED | OUTPATIENT
Start: 2020-08-11 | End: 2021-06-23

## 2020-08-11 RX ORDER — LEVOTHYROXINE SODIUM 25 UG/1
25 TABLET ORAL DAILY
Qty: 90 TABLET | Refills: 3 | Status: CANCELLED | OUTPATIENT
Start: 2020-08-11

## 2020-09-08 ENCOUNTER — ALLIED HEALTH/NURSE VISIT (OUTPATIENT)
Dept: FAMILY MEDICINE | Facility: CLINIC | Age: 55
End: 2020-09-08
Payer: COMMERCIAL

## 2020-09-08 DIAGNOSIS — Z23 NEED FOR PROPHYLACTIC VACCINATION AND INOCULATION AGAINST INFLUENZA: Primary | ICD-10-CM

## 2020-09-08 PROCEDURE — 90471 IMMUNIZATION ADMIN: CPT

## 2020-09-08 PROCEDURE — 90682 RIV4 VACC RECOMBINANT DNA IM: CPT

## 2020-09-08 PROCEDURE — 99207 ZZC NO CHARGE NURSE ONLY: CPT

## 2020-10-21 DIAGNOSIS — J45.40 MODERATE PERSISTENT ASTHMA WITHOUT COMPLICATION: ICD-10-CM

## 2020-10-21 RX ORDER — ALBUTEROL SULFATE 90 UG/1
AEROSOL, METERED RESPIRATORY (INHALATION)
Qty: 24 G | Refills: 3 | Status: SHIPPED | OUTPATIENT
Start: 2020-10-21 | End: 2021-06-23

## 2020-10-21 NOTE — TELEPHONE ENCOUNTER
"Requested Prescriptions   Pending Prescriptions Disp Refills     albuterol (PROAIR HFA/PROVENTIL HFA/VENTOLIN HFA) 108 (90 Base) MCG/ACT inhaler [Pharmacy Med Name: Albuterol Sulfate  (90 Base) MCG/ACT Aerosol Solution] 24 g 3     Sig: INHALE 2 PUFFS ORALLY EVERY SIX HOURS AS NEEDED FOR SHORTNESS OF BREATH / DYSPNEA OR WHEEZING       Asthma Maintenance Inhalers - Anticholinergics Passed - 10/21/2020  8:35 AM        Passed - Patient is age 12 years or older        Passed - Asthma control assessment score within normal limits in last 6 months     Please review ACT score.           Passed - Medication is active on med list        Passed - Recent (6 mo) or future (30 days) visit within the authorizing provider's specialty     Patient had office visit in the last 6 months or has a visit in the next 30 days with authorizing provider or within the authorizing provider's specialty.  See \"Patient Info\" tab in inbasket, or \"Choose Columns\" in Meds & Orders section of the refill encounter.           Short-Acting Beta Agonist Inhalers Protocol  Passed - 10/21/2020  8:35 AM        Passed - Patient is age 12 or older        Passed - Asthma control assessment score within normal limits in last 6 months     Please review ACT score.           Passed - Medication is active on med list        Passed - Recent (6 mo) or future (30 days) visit within the authorizing provider's specialty     Patient had office visit in the last 6 months or has a visit in the next 30 days with authorizing provider or within the authorizing provider's specialty.  See \"Patient Info\" tab in inbasket, or \"Choose Columns\" in Meds & Orders section of the refill encounter.                 "

## 2020-10-30 DIAGNOSIS — F41.1 GENERALIZED ANXIETY DISORDER: ICD-10-CM

## 2020-10-30 NOTE — TELEPHONE ENCOUNTER
"Requested Prescriptions   Pending Prescriptions Disp Refills     sertraline (ZOLOFT) 50 MG tablet [Pharmacy Med Name: SERTRALINE HCL 50 MG TABLET] 30 tablet 1     Sig: TAKE 0.5 TABLETS (25 MG) BY MOUTH DAILY FOR 7 DAYS, THEN 1 TABLET (50 MG) DAILY.       SSRIs Protocol Passed - 10/30/2020 11:02 AM        Passed - Recent (12 mo) or future (30 days) visit within the authorizing provider's specialty     Patient has had an office visit with the authorizing provider or a provider within the authorizing providers department within the previous 12 mos or has a future within next 30 days. See \"Patient Info\" tab in inbasket, or \"Choose Columns\" in Meds & Orders section of the refill encounter.              Passed - Medication is active on med list        Passed - Patient is age 18 or older        Passed - No active pregnancy on record        Passed - No positive pregnancy test in last 12 months             "

## 2020-11-02 NOTE — TELEPHONE ENCOUNTER
This was a new medication - she need to follow up with me.  I refilled it so she doesn't run out.  Please call and schedule her for a virtual visit with me this week.  Soledad Bauman, CNP

## 2020-11-04 NOTE — TELEPHONE ENCOUNTER
Message left for patient to check the pharmacy.  Needs office visit for further refills. Raven ROSEN RN

## 2020-11-04 NOTE — TELEPHONE ENCOUNTER
Left message to call care team back. Please give message below from Merna.     Debbi Wilson on 11/4/2020 at 10:35 AM

## 2020-11-08 ENCOUNTER — MYC MEDICAL ADVICE (OUTPATIENT)
Dept: FAMILY MEDICINE | Facility: CLINIC | Age: 55
End: 2020-11-08

## 2020-11-19 DIAGNOSIS — F41.1 GENERALIZED ANXIETY DISORDER: ICD-10-CM

## 2020-11-19 NOTE — TELEPHONE ENCOUNTER
"Requested Prescriptions   Pending Prescriptions Disp Refills     sertraline (ZOLOFT) 50 MG tablet 30 tablet 1     Sig: Take 1 tablet (50 mg) by mouth daily       SSRIs Protocol Passed - 11/19/2020  9:24 AM        Passed - Recent (12 mo) or future (30 days) visit within the authorizing provider's specialty     Patient has had an office visit with the authorizing provider or a provider within the authorizing providers department within the previous 12 mos or has a future within next 30 days. See \"Patient Info\" tab in inbasket, or \"Choose Columns\" in Meds & Orders section of the refill encounter.              Passed - Medication is active on med list        Passed - Patient is age 18 or older        Passed - No active pregnancy on record        Passed - No positive pregnancy test in last 12 months             "

## 2021-03-04 ENCOUNTER — MYC MEDICAL ADVICE (OUTPATIENT)
Dept: FAMILY MEDICINE | Facility: CLINIC | Age: 56
End: 2021-03-04

## 2021-03-04 DIAGNOSIS — J45.40 MODERATE PERSISTENT ASTHMA WITHOUT COMPLICATION: ICD-10-CM

## 2021-03-04 RX ORDER — LORATADINE 10 MG/1
1 TABLET ORAL DAILY
Qty: 90 TABLET | Refills: 0 | Status: SHIPPED | OUTPATIENT
Start: 2021-03-04 | End: 2021-06-23

## 2021-03-04 NOTE — TELEPHONE ENCOUNTER
"Requested Prescriptions   Pending Prescriptions Disp Refills     loratadine (CLARITIN) 10 MG tablet 90 tablet 3     Sig: Take 1 tablet (10 mg) by mouth daily       Antihistamines Protocol Passed - 3/4/2021  9:42 AM        Passed - Patient is 3-64 years of age     Apply weight-based dosing for peds patients age 3 - 12 years of age.    Forward request to provider for patients under the age of 3 or over the age of 64.          Passed - Recent (12 mo) or future (30 days) visit within the authorizing provider's specialty     Patient has had an office visit with the authorizing provider or a provider within the authorizing providers department within the previous 12 mos or has a future within next 30 days. See \"Patient Info\" tab in inbasket, or \"Choose Columns\" in Meds & Orders section of the refill encounter.              Passed - Medication is active on med list             "

## 2021-03-04 NOTE — TELEPHONE ENCOUNTER
Pharmacy transfer.  Prescription approved per [facility] Refill Protocol.    Malini SHAH RN, BSN

## 2021-03-21 ENCOUNTER — MYC MEDICAL ADVICE (OUTPATIENT)
Dept: FAMILY MEDICINE | Facility: CLINIC | Age: 56
End: 2021-03-21

## 2021-04-14 ENCOUNTER — MYC MEDICAL ADVICE (OUTPATIENT)
Dept: FAMILY MEDICINE | Facility: CLINIC | Age: 56
End: 2021-04-14

## 2021-06-21 NOTE — PROGRESS NOTES
SUBJECTIVE:   CC: Rose Mary Rose is an 56 year old woman who presents for preventive health visit.       Patient has been advised of split billing requirements and indicates understanding: Yes  Healthy Habits:     Getting at least 3 servings of Calcium per day:  NO (but takes calcium supplement)    Bi-annual eye exam:  NO    Dental care twice a year:  Yes    Sleep apnea or symptoms of sleep apnea:  None    Diet:: trying to eat low carbs/ diabetic diet.    Frequency of exercise:  2-3 days/week (walking)    Duration of exercise:  15-30 minutes    Taking medications regularly:  Yes    Barriers to taking medications:  None    Medication side effects:  None    PHQ-2 Total Score: 0    Additional concerns today:  Yes (refill)          PROBLEMS TO ADD ON...  Hypertension Follow-up      Do you check your blood pressure regularly outside of the clinic? No     Are you following a low salt diet? Yes    Are your blood pressures ever more than 140 on the top number (systolic) OR more   than 90 on the bottom number (diastolic), for example 140/90?  Doesn't check    Asthma Follow-Up  She feels her asthma is well controlled  Was ACT completed today?    Yes    ACT Total Scores 6/23/2021   ACT TOTAL SCORE -   ASTHMA ER VISITS -   ASTHMA HOSPITALIZATIONS -   ACT TOTAL SCORE (Goal Greater than or Equal to 20) 20   In the past 12 months, how many times did you visit the emergency room for your asthma without being admitted to the hospital? 0   In the past 12 months, how many times were you hospitalized overnight because of your asthma? 0          How many days per week do you miss taking your asthma controller medication?  0    Please describe any recent triggers for your asthma: smoke, animal dander, mold, humidity and emotions    Have you had any Emergency Room Visits, Urgent Care Visits, or Hospital Admissions since your last office visit?  No    Hypothyroidism Follow-up      Since last visit, patient describes the following  symptoms: Weight stable, no hair loss, no skin changes, no constipation, no loose stools;  Sometimes she is constipated      Today's PHQ-2 Score:   PHQ-2 ( 1999 Pf[employer]) 6/23/2021   Q1: Little interest or pleasure in doing things 0   Q2: Feeling down, depressed or hopeless 0   PHQ-2 Score 0   Q1: Little interest or pleasure in doing things -   Q2: Feeling down, depressed or hopeless -   PHQ-2 Score -       Abuse: Current or Past (Physical, Sexual or Emotional) - No  Do you feel safe in your environment? Yes        Social History     Tobacco Use     Smoking status: Light Tobacco Smoker     Packs/day: 0.25     Years: 25.00     Pack years: 6.25     Types: Cigarettes     Smokeless tobacco: Never Used     Tobacco comment: 10 cigarettes per day   Substance Use Topics     Alcohol use: Not Currently     Frequency: Never     Comment: 1 beer per month, usually none     If you drink alcohol do you typically have >3 drinks per day or >7 drinks per week? No    Alcohol Use 6/23/2021   Prescreen: >3 drinks/day or >7 drinks/week? No       Reviewed orders with patient.  Reviewed health maintenance and updated orders accordingly - Yes      Breast Cancer Screening:  Any new diagnosis of family breast, ovarian, or bowel cancer? No    FSH-7: No flowsheet data found.  Mammogram Screening: Recommended annual mammography  Pertinent mammograms are reviewed under the imaging tab.    History of abnormal Pap smear: YES - once in her 30s - all normal since  PAP / HPV Latest Ref Rng & Units 6/5/2018 7/24/2015 8/31/2012   PAP - NIL NIL NIL   HPV 16 DNA NEG:Negative Negative - -   HPV 18 DNA NEG:Negative Negative - -   OTHER HR HPV NEG:Negative Negative - -     Reviewed and updated as needed this visit by clinical staff  Tobacco  Allergies  Meds  Problems  Med Hx  Surg Hx  Fam Hx  Soc Hx          Reviewed and updated as needed this visit by Provider  Tobacco  Allergies  Meds  Problems  Med Hx  Surg Hx  Fam Hx             Review of  "Systems  CONSTITUTIONAL: NEGATIVE for fever, chills, change in weight  INTEGUMENTARY/SKIN: NEGATIVE for worrisome rashes, moles or lesions  EYES: NEGATIVE for vision changes or irritation  ENT: NEGATIVE for ear, mouth and throat problems  RESP: NEGATIVE for significant cough or SOB  BREAST: NEGATIVE for masses, tenderness or discharge  CV: NEGATIVE for chest pain, palpitations or peripheral edema  GI: NEGATIVE for nausea, abdominal pain, heartburn, or change in bowel habits  : NEGATIVE for unusual urinary or vaginal symptoms. No vaginal bleeding.  MUSCULOSKELETAL: NEGATIVE for significant arthralgias or myalgia  NEURO: NEGATIVE for weakness, dizziness or paresthesias  PSYCHIATRIC: NEGATIVE for changes in mood or affect      OBJECTIVE:   /80 (BP Location: Right arm)   Pulse 62   Temp 97  F (36.1  C) (Tympanic)   Resp 16   Ht 1.562 m (5' 1.5\")   Wt 71.2 kg (157 lb)   LMP 04/06/2016   SpO2 98%   BMI 29.18 kg/m    Physical Exam  GENERAL APPEARANCE: healthy, alert and no distress  EYES: Eyes grossly normal to inspection, PERRL and conjunctivae and sclerae normal  HENT: ear canals and TM's normal, nose and mouth without ulcers or lesions, oropharynx clear and oral mucous membranes moist  NECK: no adenopathy, no asymmetry, masses, or scars and thyroid normal to palpation  RESP: lungs clear to auscultation - no rales, rhonchi or wheezes  BREAST: normal without masses, tenderness or nipple discharge and no palpable axillary masses or adenopathy  CV: regular rate and rhythm, normal S1 S2, no S3 or S4, no murmur, click or rub, no peripheral edema and peripheral pulses strong  ABDOMEN: soft, nontender, no hepatosplenomegaly, no masses and bowel sounds normal   (female): normal female external genitalia, normal urethral meatus, vaginal mucosal atrophy noted, normal cervix, adnexae, and uterus without masses or abnormal discharge  MS: no musculoskeletal defects are noted and gait is age appropriate without " ataxia  SKIN: no suspicious lesions or rashes  NEURO: Normal strength and tone, sensory exam grossly normal, mentation intact and speech normal  PSYCH: mentation appears normal and affect normal/bright        ASSESSMENT/PLAN:       ICD-10-CM    1. Encounter for gynecological examination without abnormal finding  Z01.419 Pap imaged thin layer screen with HPV - recommended age 30 - 65 years (select HPV order below)     HPV High Risk Types DNA Cervical     MA SCREENING DIGITAL BILAT - Future  (s+30)     2. Acquired hypothyroidism  E03.9 Due for TSH recheck today.  levothyroxine (SYNTHROID/LEVOTHROID) 25 MCG tablet     OFFICE/OUTPT VISIT,EST,LEVL III     TSH     3. Essential hypertension with goal blood pressure less than 140/90  I10 Well controlled.  OFFICE/OUTPT VISIT,EST,LEVL III     lisinopril (ZESTRIL) 10 MG tablet     Basic metabolic panel  (Ca, Cl, CO2, Creat, Gluc, K, Na, BUN)     4. Moderate persistent asthma without complication  J45.40 Well controlled.  ipratropium - albuterol 0.5 mg/2.5 mg/3 mL (DUONEB) 0.5-2.5 (3) MG/3ML neb solution     albuterol (PROVENTIL) (2.5 MG/3ML) 0.083% neb solution     albuterol (PROAIR HFA/PROVENTIL HFA/VENTOLIN HFA) 108 (90 Base) MCG/ACT inhaler     loratadine (CLARITIN) 10 MG tablet     budesonide-formoterol (SYMBICORT) 160-4.5 MCG/ACT Inhaler     OFFICE/OUTPT VISIT,EST,LEVL III     5. Nasal congestion  R09.81 fluticasone (FLONASE) 50 MCG/ACT nasal spray     OFFICE/OUTPT VISIT,EST,LEVL III   6. Dyshidrotic eczema  L30.1 clobetasol (TEMOVATE) 0.05 % external ointment     OFFICE/OUTPT VISIT,EST,LEVL III   7. Screening for HIV (human immunodeficiency virus)  Z11.4 HIV Antigen Antibody Combo   8. Impaired fasting glucose  R73.01 Hemoglobin A1c       Patient has been advised of split billing requirements and indicates understanding: Yes     COUNSELING:  Reviewed preventive health counseling, as reflected in patient instructions    Estimated body mass index is 29.18 kg/m  as  "calculated from the following:    Height as of this encounter: 1.562 m (5' 1.5\").    Weight as of this encounter: 71.2 kg (157 lb).    Weight management plan: Discussed healthy diet and exercise guidelines    She reports that she has been smoking cigarettes. She has a 6.25 pack-year smoking history. She has never used smokeless tobacco.  Tobacco Cessation Action Plan:   Pharmacotherapies : Nicotine patch      The risks, benefits and treatment options of prescribed medications or other treatments have been discussed with the patient. The patient verbalized their understanding and should call or follow up if no improvement or if they develop further problems.    REDD Eubanks [facility]  "

## 2021-06-23 ENCOUNTER — OFFICE VISIT (OUTPATIENT)
Dept: FAMILY MEDICINE | Facility: CLINIC | Age: 56
End: 2021-06-23
Payer: COMMERCIAL

## 2021-06-23 VITALS
DIASTOLIC BLOOD PRESSURE: 80 MMHG | TEMPERATURE: 97 F | WEIGHT: 157 LBS | HEIGHT: 62 IN | RESPIRATION RATE: 16 BRPM | SYSTOLIC BLOOD PRESSURE: 138 MMHG | OXYGEN SATURATION: 98 % | BODY MASS INDEX: 28.89 KG/M2 | HEART RATE: 62 BPM

## 2021-06-23 DIAGNOSIS — E03.9 ACQUIRED HYPOTHYROIDISM: ICD-10-CM

## 2021-06-23 DIAGNOSIS — J45.40 MODERATE PERSISTENT ASTHMA WITHOUT COMPLICATION: ICD-10-CM

## 2021-06-23 DIAGNOSIS — I10 ESSENTIAL HYPERTENSION WITH GOAL BLOOD PRESSURE LESS THAN 140/90: ICD-10-CM

## 2021-06-23 DIAGNOSIS — Z01.419 ENCOUNTER FOR GYNECOLOGICAL EXAMINATION WITHOUT ABNORMAL FINDING: Primary | ICD-10-CM

## 2021-06-23 DIAGNOSIS — L30.1 DYSHIDROTIC ECZEMA: ICD-10-CM

## 2021-06-23 DIAGNOSIS — R09.81 NASAL CONGESTION: ICD-10-CM

## 2021-06-23 DIAGNOSIS — Z11.4 SCREENING FOR HIV (HUMAN IMMUNODEFICIENCY VIRUS): ICD-10-CM

## 2021-06-23 DIAGNOSIS — R73.01 IMPAIRED FASTING GLUCOSE: ICD-10-CM

## 2021-06-23 LAB
ANION GAP SERPL CALCULATED.3IONS-SCNC: 6 MMOL/L (ref 3–14)
BUN SERPL-MCNC: 10 MG/DL (ref 7–30)
CALCIUM SERPL-MCNC: 9.5 MG/DL (ref 8.5–10.1)
CHLORIDE SERPL-SCNC: 103 MMOL/L (ref 94–109)
CO2 SERPL-SCNC: 29 MMOL/L (ref 20–32)
CREAT SERPL-MCNC: 0.66 MG/DL (ref 0.52–1.04)
GFR SERPL CREATININE-BSD FRML MDRD: >90 ML/MIN/{1.73_M2}
GLUCOSE SERPL-MCNC: 140 MG/DL (ref 70–99)
HBA1C MFR BLD: 7 % (ref 0–5.6)
HIV 1+2 AB+HIV1 P24 AG SERPL QL IA: NONREACTIVE
POTASSIUM SERPL-SCNC: 4.4 MMOL/L (ref 3.4–5.3)
SODIUM SERPL-SCNC: 138 MMOL/L (ref 133–144)
TSH SERPL DL<=0.005 MIU/L-ACNC: 2.48 MU/L (ref 0.4–4)

## 2021-06-23 PROCEDURE — 83036 HEMOGLOBIN GLYCOSYLATED A1C: CPT | Performed by: NURSE PRACTITIONER

## 2021-06-23 PROCEDURE — 87624 HPV HI-RISK TYP POOLED RSLT: CPT | Performed by: NURSE PRACTITIONER

## 2021-06-23 PROCEDURE — 80048 BASIC METABOLIC PNL TOTAL CA: CPT | Performed by: NURSE PRACTITIONER

## 2021-06-23 PROCEDURE — 36415 COLL VENOUS BLD VENIPUNCTURE: CPT | Performed by: NURSE PRACTITIONER

## 2021-06-23 PROCEDURE — 87389 HIV-1 AG W/HIV-1&-2 AB AG IA: CPT | Performed by: NURSE PRACTITIONER

## 2021-06-23 PROCEDURE — 99396 PREV VISIT EST AGE 40-64: CPT | Performed by: NURSE PRACTITIONER

## 2021-06-23 PROCEDURE — 84443 ASSAY THYROID STIM HORMONE: CPT | Performed by: NURSE PRACTITIONER

## 2021-06-23 PROCEDURE — 99214 OFFICE O/P EST MOD 30 MIN: CPT | Mod: 25 | Performed by: NURSE PRACTITIONER

## 2021-06-23 PROCEDURE — G0145 SCR C/V CYTO,THINLAYER,RESCR: HCPCS | Performed by: NURSE PRACTITIONER

## 2021-06-23 RX ORDER — LISINOPRIL 10 MG/1
10 TABLET ORAL DAILY
Qty: 90 TABLET | Refills: 3 | Status: CANCELLED | OUTPATIENT
Start: 2021-06-23

## 2021-06-23 RX ORDER — ALBUTEROL SULFATE 0.83 MG/ML
2.5 SOLUTION RESPIRATORY (INHALATION) EVERY 4 HOURS PRN
Qty: 540 ML | Refills: 11 | Status: SHIPPED | OUTPATIENT
Start: 2021-06-23 | End: 2021-12-20

## 2021-06-23 RX ORDER — FLUTICASONE PROPIONATE 50 MCG
1 SPRAY, SUSPENSION (ML) NASAL DAILY
Qty: 16 G | Refills: 9 | Status: SHIPPED | OUTPATIENT
Start: 2021-06-23 | End: 2021-12-20

## 2021-06-23 RX ORDER — LEVOTHYROXINE SODIUM 25 UG/1
25 TABLET ORAL DAILY
Qty: 90 TABLET | Refills: 3 | Status: SHIPPED | OUTPATIENT
Start: 2021-06-23 | End: 2021-07-02

## 2021-06-23 RX ORDER — IPRATROPIUM BROMIDE AND ALBUTEROL SULFATE 2.5; .5 MG/3ML; MG/3ML
1 SOLUTION RESPIRATORY (INHALATION) EVERY 6 HOURS PRN
Qty: 720 ML | Refills: 5 | Status: SHIPPED | OUTPATIENT
Start: 2021-06-23 | End: 2022-06-03

## 2021-06-23 RX ORDER — CLOBETASOL PROPIONATE 0.5 MG/G
OINTMENT TOPICAL
Qty: 60 G | Refills: 5 | Status: SHIPPED | OUTPATIENT
Start: 2021-06-23 | End: 2022-06-03

## 2021-06-23 RX ORDER — BUDESONIDE AND FORMOTEROL FUMARATE DIHYDRATE 160; 4.5 UG/1; UG/1
2 AEROSOL RESPIRATORY (INHALATION) 2 TIMES DAILY
Qty: 30 G | Refills: 3 | Status: SHIPPED | OUTPATIENT
Start: 2021-06-23 | End: 2021-12-20

## 2021-06-23 RX ORDER — LISINOPRIL 10 MG/1
10 TABLET ORAL DAILY
Qty: 90 TABLET | Refills: 3 | Status: SHIPPED | OUTPATIENT
Start: 2021-06-23 | End: 2021-07-02

## 2021-06-23 RX ORDER — LORATADINE 10 MG/1
1 TABLET ORAL DAILY
Qty: 90 TABLET | Refills: 3 | Status: SHIPPED | OUTPATIENT
Start: 2021-06-23 | End: 2022-03-24

## 2021-06-23 RX ORDER — ALBUTEROL SULFATE 90 UG/1
AEROSOL, METERED RESPIRATORY (INHALATION)
Qty: 24 G | Refills: 3 | Status: SHIPPED | OUTPATIENT
Start: 2021-06-23 | End: 2021-12-20

## 2021-06-23 SDOH — HEALTH STABILITY: MENTAL HEALTH: HOW OFTEN DO YOU HAVE A DRINK CONTAINING ALCOHOL?: NEVER

## 2021-06-23 ASSESSMENT — MIFFLIN-ST. JEOR: SCORE: 1237.46

## 2021-06-23 NOTE — LETTER
"My Asthma Action Plan    Name: Rose Mary Constantino   YOB: 1965  Date: 6/23/2021   My doctor: REDD Eubanks CNP   My clinic: [facility]        My Control Medicine: { :165638}  My Rescue Medicine: { :478001::\"Albuterol (Proair/Ventolin/Proventil HFA) 2-4 puffs EVERY 4 HOURS as needed. Use a spacer if recommended by your provider.\"}  {AAP include Oral Steroid (Optional) :708443} My Asthma Severity:   { :498639}  Know your asthma triggers: { :991140}  humidity  Patient is unaware of triggers            GREEN ZONE   Good Control    I feel good    No cough or wheeze    Can work, sleep and play without asthma symptoms       Take your asthma control medicine every day.     1. If exercise triggers your asthma, take your rescue medication    15 minutes before exercise or sports, and    During exercise if you have asthma symptoms  2. Spacer to use with inhaler: If you have a spacer, make sure to use it with your inhaler             YELLOW ZONE Getting Worse  I have ANY of these:    I do not feel good    Cough or wheeze    Chest feels tight    Wake up at night   1. Keep taking your Green Zone medications  2. Start taking your rescue medicine:    every 20 minutes for up to 1 hour. Then every 4 hours for 24-48 hours.  3. If you stay in the Yellow Zone for more than 12-24 hours, contact your doctor.  4. If you do not return to the Green Zone in 12-24 hours or you get worse, start taking your oral steroid medicine if prescribed by your provider.           RED ZONE Medical Alert - Get Help  I have ANY of these:    I feel awful    Medicine is not helping    Breathing getting harder    Trouble walking or talking    Nose opens wide to breathe       1. Take your rescue medicine NOW  2. If your provider has prescribed an oral steroid medicine, start taking it NOW  3. Call your doctor NOW  4. If you are still in the Red Zone after 20 minutes and you have not reached your doctor:    Take your " rescue medicine again and    Call 911 or go to the emergency room right away    See your regular doctor within 2 weeks of an Emergency Room or Urgent Care visit for follow-up treatment.          Annual Reminders:  Meet with Asthma Educator,  Flu Shot in the Fall, consider Pneumonia Vaccination for patients with asthma (aged 19 and older).    Pharmacy:    [id number] [place] [facility] - [place], [place] - [address] [facility] [place], [place] - [address] [facility] [place] [facility] [place] - [address] [facility] [address]  [facility] [place] - [address]  [facility] - [facility], [place] - [address]    Electronically signed by REDD Eubanks CNP   Date: 06/23/21                      Asthma Triggers  How To Control Things That Make Your Asthma Worse    Triggers are things that make your asthma worse.  Look at the list below to help you find your triggers and what you can do about them.  You can help prevent asthma flare-ups by staying away from your triggers.      Trigger                                                          What you can do   Cigarette Smoke  Tobacco smoke can make asthma worse. Do not allow smoking in your home, car or around you.  Be sure no one smokes at a child s day care or school.  If you smoke, ask your health care provider for ways to help you quit.  Ask family members to quit too.  Ask your health care provider for a referral to Quit Plan to help you quit smoking, or call 6-432-206-PLAN.     Colds, Flu, Bronchitis  These are common triggers of asthma. Wash your hands often.  Don t touch your eyes, nose or mouth.  Get a flu shot every year.     Dust Mites  These are tiny bugs that live in cloth or carpet. They are too small to see. Wash sheets and blankets in hot water every week.   Encase pillows and mattress in dust mite proof covers.  Avoid having carpet if you can. If you have carpet, vacuum weekly.   Use a  dust mask and HEPA vacuum.   Pollen and Outdoor Mold  Some people are allergic to trees, grass, or weed pollen, or molds. Try to keep your windows closed.  Limit time out doors when pollen count is high.   Ask you health care provider about taking medicine during allergy season.     Animal Dander  Some people are allergic to skin flakes, urine or saliva from pets with fur or feathers. Keep pets with fur or feathers out of your home.    If you can t keep the pet outdoors, then keep the pet out of your bedroom.  Keep the bedroom door closed.  Keep pets off cloth furniture and away from stuffed toys.     Mice, Rats, and Cockroaches   Some people are allergic to the waste from these pests.   Cover food and garbage.  Clean up spills and food crumbs.  Store grease in the refrigerator.   Keep food out of the bedroom.   Indoor Mold  This can be a trigger if your home has high moisture. Fix leaking faucets, pipes, or other sources of water.   Clean moldy surfaces.  Dehumidify basement if it is damp and smelly.   Smoke, Strong Odors, and Sprays  These can reduce air quality. Stay away from strong odors and sprays, such as perfume, powder, hair spray, paints, smoke incense, paint, cleaning products, candles and new carpet.   Exercise or Sports  Some people with asthma have this trigger. Be active!  Ask your doctor about taking medicine before sports or exercise to prevent symptoms.    Warm up for 5-10 minutes before and after sports or exercise.     Other Triggers of Asthma  Cold air:  Cover your nose and mouth with a scarf.  Sometimes laughing or crying can be a trigger.  Some medicines and food can trigger asthma.

## 2021-06-24 DIAGNOSIS — E11.9 TYPE 2 DIABETES MELLITUS WITHOUT COMPLICATION, WITHOUT LONG-TERM CURRENT USE OF INSULIN (H): Primary | ICD-10-CM

## 2021-06-24 RX ORDER — METFORMIN HCL 500 MG
500 TABLET, EXTENDED RELEASE 24 HR ORAL
Qty: 90 TABLET | Refills: 3 | Status: SHIPPED | OUTPATIENT
Start: 2021-06-24 | End: 2022-03-24

## 2021-06-24 ASSESSMENT — ASTHMA QUESTIONNAIRES: ACT_TOTALSCORE: 20

## 2021-06-25 LAB
COPATH REPORT: NORMAL
PAP: NORMAL

## 2021-06-29 DIAGNOSIS — E03.9 ACQUIRED HYPOTHYROIDISM: ICD-10-CM

## 2021-06-29 DIAGNOSIS — I10 ESSENTIAL HYPERTENSION WITH GOAL BLOOD PRESSURE LESS THAN 140/90: ICD-10-CM

## 2021-07-02 RX ORDER — LEVOTHYROXINE SODIUM 25 UG/1
TABLET ORAL
Qty: 90 TABLET | Refills: 2 | Status: SHIPPED | OUTPATIENT
Start: 2021-07-02 | End: 2022-03-24

## 2021-07-02 RX ORDER — LISINOPRIL 10 MG/1
TABLET ORAL
Qty: 90 TABLET | Refills: 2 | Status: SHIPPED | OUTPATIENT
Start: 2021-07-02 | End: 2022-03-24

## 2021-07-02 NOTE — TELEPHONE ENCOUNTER
"Pharmacy transfer. Needs to go to mail order now.    Requested Prescriptions   Pending Prescriptions Disp Refills     lisinopril (ZESTRIL) 10 MG tablet [Pharmacy Med Name: LISINOPRIL TABS 10MG] 90 tablet 3     Sig: TAKE 1 TABLET BY MOUTH DAILY       ACE Inhibitors (Including Combos) Protocol Passed - 6/29/2021 11:30 PM        Passed - Blood pressure under 140/90 in past 12 months     BP Readings from Last 3 Encounters:   06/23/21 138/80   06/08/20 120/78   01/30/20 119/75                 Passed - Recent (12 mo) or future (30 days) visit within the authorizing provider's specialty     Patient has had an office visit with the authorizing provider or a provider within the authorizing providers department within the previous 12 mos or has a future within next 30 days. See \"Patient Info\" tab in inbasket, or \"Choose Columns\" in Meds & Orders section of the refill encounter.              Passed - Medication is active on med list        Passed - Patient is age 18 or older        Passed - No active pregnancy on record        Passed - Normal serum creatinine on file in past 12 months     Recent Labs   Lab Test 06/23/21  0823   CR 0.66       Ok to refill medication if creatinine is low          Passed - Normal serum potassium on file in past 12 months     Recent Labs   Lab Test 06/23/21  0823   POTASSIUM 4.4             Passed - No positive pregnancy test within past 12 months           levothyroxine (SYNTHROID/LEVOTHROID) 25 MCG tablet [Pharmacy Med Name: L-THYROXINE (SYNTHROID) TABS 25MCG] 90 tablet 3     Sig: TAKE 1 TABLET BY MOUTH DAILY       Thyroid Protocol Passed - 6/29/2021 11:30 PM        Passed - Patient is 12 years or older        Passed - Recent (12 mo) or future (30 days) visit within the authorizing provider's specialty     Patient has had an office visit with the authorizing provider or a provider within the authorizing providers department within the previous 12 mos or has a future within next 30 days. See " "\"Patient Info\" tab in inbasket, or \"Choose Columns\" in Meds & Orders section of the refill encounter.              Passed - Medication is active on med list        Passed - Normal TSH on file in past 12 months     Recent Labs   Lab Test 06/23/21  0823   TSH 2.48              Passed - No active pregnancy on record     If patient is pregnant or has had a positive pregnancy test, please check TSH.          Passed - No positive pregnancy test in past 12 months     If patient is pregnant or has had a positive pregnancy test, please check TSH.               Malini SHAH RN, BSN      "

## 2021-09-04 ENCOUNTER — HEALTH MAINTENANCE LETTER (OUTPATIENT)
Age: 56
End: 2021-09-04

## 2021-09-23 ENCOUNTER — IMMUNIZATION (OUTPATIENT)
Dept: FAMILY MEDICINE | Facility: CLINIC | Age: 56
End: 2021-09-23
Payer: COMMERCIAL

## 2021-09-23 PROCEDURE — 90471 IMMUNIZATION ADMIN: CPT

## 2021-09-23 PROCEDURE — 90682 RIV4 VACC RECOMBINANT DNA IM: CPT

## 2021-10-06 ENCOUNTER — LAB (OUTPATIENT)
Dept: FAMILY MEDICINE | Facility: CLINIC | Age: 56
End: 2021-10-06
Attending: NURSE PRACTITIONER
Payer: COMMERCIAL

## 2021-10-06 ENCOUNTER — MYC MEDICAL ADVICE (OUTPATIENT)
Dept: FAMILY MEDICINE | Facility: CLINIC | Age: 56
End: 2021-10-06

## 2021-10-06 ENCOUNTER — E-VISIT (OUTPATIENT)
Dept: FAMILY MEDICINE | Facility: CLINIC | Age: 56
End: 2021-10-06
Payer: COMMERCIAL

## 2021-10-06 DIAGNOSIS — R05.9 COUGH: ICD-10-CM

## 2021-10-06 DIAGNOSIS — J20.9 ACUTE BRONCHITIS WITH SYMPTOMS > 10 DAYS: ICD-10-CM

## 2021-10-06 DIAGNOSIS — J45.41 MODERATE PERSISTENT ASTHMA WITH EXACERBATION: Primary | ICD-10-CM

## 2021-10-06 PROCEDURE — U0005 INFEC AGEN DETEC AMPLI PROBE: HCPCS

## 2021-10-06 PROCEDURE — 99207 PR NO CHARGE LOS: CPT

## 2021-10-06 PROCEDURE — 99421 OL DIG E/M SVC 5-10 MIN: CPT | Performed by: NURSE PRACTITIONER

## 2021-10-06 PROCEDURE — U0003 INFECTIOUS AGENT DETECTION BY NUCLEIC ACID (DNA OR RNA); SEVERE ACUTE RESPIRATORY SYNDROME CORONAVIRUS 2 (SARS-COV-2) (CORONAVIRUS DISEASE [COVID-19]), AMPLIFIED PROBE TECHNIQUE, MAKING USE OF HIGH THROUGHPUT TECHNOLOGIES AS DESCRIBED BY CMS-2020-01-R: HCPCS

## 2021-10-06 RX ORDER — AZITHROMYCIN 250 MG/1
TABLET, FILM COATED ORAL
Qty: 6 TABLET | Refills: 0 | Status: SHIPPED | OUTPATIENT
Start: 2021-10-06 | End: 2021-10-11

## 2021-10-06 RX ORDER — PREDNISONE 20 MG/1
40 TABLET ORAL DAILY
Qty: 10 TABLET | Refills: 0 | Status: SHIPPED | OUTPATIENT
Start: 2021-10-06 | End: 2021-10-11

## 2021-10-06 NOTE — LETTER
[facility]  [address] [address]  [phone]          October 7, 2021    RE:  Rose Mary Constantino                                                                                                                                                       [address] [address]            To whom it may concern:    Rose Mary Constantino is under my professional care.  She may return to work without restrictions on October 8, 2021.      Sincerely,          Soledad Bauman, CNP

## 2021-10-06 NOTE — LETTER
Rose Mary Constantino  [address] [address] [address]          To Whom It May Concern:

## 2021-10-06 NOTE — TELEPHONE ENCOUNTER
Soledad,    Patient asking for another work note for one more day off.  Started for your completion. Raven ROSEN RN

## 2021-10-06 NOTE — PATIENT INSTRUCTIONS
"    Dear Rose Mary Constantino    After reviewing your responses, I've been able to diagnose you with \"Bronchitis\" which is a common infection of your lungs. While this is most commonly caused by a virus, the symptoms you have given suggest you should be treated with antibiotics.     I have sent ZPAK and prednsione to your pharmacy to treat this infection.     It is important that you take all of your prescribed medication even if your symptoms are improving after a few doses. Taking all of your medicine helps prevent the symptoms from returning.     If your symptoms worsen, you develop chest pain or shortness of breath, fevers over 101, or are not improving in 5 days, please contact your primary care provider for an appointment or visit any of our convenient Walk-in Care or Urgent Care Centers to be seen which can be found on our website here.    Thanks again for choosing us as your health care partner,    REDD Eubanks CNP    Dear Rose Mary Constantino    I sent in a script for antibiotics and prednisone.  However, I also want you to get a COVID19 PCR test - someone will call you to help you schedule this.    Thanks for choosing us as your health care partner,    REDD Eubanks CNP  "

## 2021-10-07 ENCOUNTER — MYC MEDICAL ADVICE (OUTPATIENT)
Dept: FAMILY MEDICINE | Facility: CLINIC | Age: 56
End: 2021-10-07

## 2021-10-07 LAB — SARS-COV-2 RNA RESP QL NAA+PROBE: NEGATIVE

## 2021-10-30 ENCOUNTER — HEALTH MAINTENANCE LETTER (OUTPATIENT)
Age: 56
End: 2021-10-30

## 2021-11-22 ENCOUNTER — DOCUMENTATION ONLY (OUTPATIENT)
Dept: LAB | Facility: CLINIC | Age: 56
End: 2021-11-22
Payer: COMMERCIAL

## 2021-11-22 DIAGNOSIS — E11.9 TYPE 2 DIABETES MELLITUS WITHOUT COMPLICATION, WITHOUT LONG-TERM CURRENT USE OF INSULIN (H): Primary | ICD-10-CM

## 2021-11-23 NOTE — PROGRESS NOTES
Soledad,    Patient is reached to find out about what labs she is wanting done.  She wants her diabetic labs done.  Looks like she needs a few things, pended for your approval.  She did make office visit appt with you for 12/10/21. Raven ROSEN RN

## 2021-12-03 ENCOUNTER — LAB (OUTPATIENT)
Dept: LAB | Facility: CLINIC | Age: 56
End: 2021-12-03
Payer: COMMERCIAL

## 2021-12-03 DIAGNOSIS — E11.9 TYPE 2 DIABETES MELLITUS WITHOUT COMPLICATION, WITHOUT LONG-TERM CURRENT USE OF INSULIN (H): ICD-10-CM

## 2021-12-03 LAB
CHOLEST SERPL-MCNC: 211 MG/DL
CREAT UR-MCNC: 66 MG/DL
FASTING STATUS PATIENT QL REPORTED: YES
HBA1C MFR BLD: 6.5 % (ref 0–5.6)
HDLC SERPL-MCNC: 68 MG/DL
LDLC SERPL CALC-MCNC: 116 MG/DL
MICROALBUMIN UR-MCNC: <5 MG/L
MICROALBUMIN/CREAT UR: NORMAL MG/G{CREAT}
NONHDLC SERPL-MCNC: 143 MG/DL
TRIGL SERPL-MCNC: 134 MG/DL

## 2021-12-03 PROCEDURE — 82043 UR ALBUMIN QUANTITATIVE: CPT

## 2021-12-03 PROCEDURE — 83036 HEMOGLOBIN GLYCOSYLATED A1C: CPT

## 2021-12-03 PROCEDURE — 36415 COLL VENOUS BLD VENIPUNCTURE: CPT

## 2021-12-03 PROCEDURE — 80061 LIPID PANEL: CPT

## 2021-12-14 ENCOUNTER — OFFICE VISIT (OUTPATIENT)
Dept: FAMILY MEDICINE | Facility: CLINIC | Age: 56
End: 2021-12-14
Payer: COMMERCIAL

## 2021-12-14 VITALS
HEIGHT: 62 IN | TEMPERATURE: 97.3 F | HEART RATE: 64 BPM | RESPIRATION RATE: 16 BRPM | SYSTOLIC BLOOD PRESSURE: 122 MMHG | BODY MASS INDEX: 26.87 KG/M2 | OXYGEN SATURATION: 98 % | WEIGHT: 146 LBS | DIASTOLIC BLOOD PRESSURE: 70 MMHG

## 2021-12-14 DIAGNOSIS — I10 BENIGN ESSENTIAL HYPERTENSION: ICD-10-CM

## 2021-12-14 DIAGNOSIS — E11.9 TYPE 2 DIABETES MELLITUS WITHOUT COMPLICATION, WITHOUT LONG-TERM CURRENT USE OF INSULIN (H): Primary | ICD-10-CM

## 2021-12-14 DIAGNOSIS — E03.9 ACQUIRED HYPOTHYROIDISM: ICD-10-CM

## 2021-12-14 PROCEDURE — 99214 OFFICE O/P EST MOD 30 MIN: CPT | Performed by: NURSE PRACTITIONER

## 2021-12-14 ASSESSMENT — MIFFLIN-ST. JEOR: SCORE: 1187.56

## 2021-12-14 NOTE — PROGRESS NOTES
Assessment & Plan     Type 2 diabetes mellitus without complication, without long-term current use of insulin (H)  Well-controlled.  A1c decreased with Metformin, weight loss and dietary changes.  Encourage patient to continue with healthy habits.  She is continuing to work on smoking cessation.  Recheck in 6 months.    Acquired hypothyroidism  Well-controlled    Benign essential hypertension  Well-controlled      The risks, benefits and treatment options of prescribed medications or other treatments have been discussed with the patient. The patient verbalized their understanding and should call or follow up if no improvement or if they develop further problems.    REDD Eubanks [facility]          Betzy Conway is a 56 year old who presents for the following health issues     Labs were drawn on 12/03/21.  Any meds for today here at [facility], [facility]     History of Present Illness       Diabetes:   She presents for follow up of diabetes.  She is checking home blood glucose a few times a week. She checks blood glucose before meals and after meals.  Blood glucose is never over 200 and never under 70. When her blood glucose is low, the patient is asymptomatic for confusion, blurred vision, lethargy and reports not feeling dizzy, shaky, or weak.  She is concerned about low blood sugar, several less than 70 in the past few weeks. She is not experiencing numbness or burning in feet, excessive thirst, blurry vision, weight changes or redness, sores or blisters on feet. The patient has not had a diabetic eye exam in the last 12 months.         She eats 2-3 servings of fruits and vegetables daily.She consumes 2 sweetened beverage(s) daily.She exercises with enough effort to increase her heart rate 10 to 19 minutes per day.  She exercises with enough effort to increase her heart rate 3 or less days per week.   She is taking medications  "regularly.      BP Readings from Last 2 Encounters:   12/14/21 122/70   06/23/21 138/80     Hemoglobin A1C POCT (%)   Date Value   06/23/2021 7.0 (H)   06/10/2017 6.0     Hemoglobin A1C (%)   Date Value   12/03/2021 6.5 (H)     LDL Cholesterol Calculated (mg/dL)   Date Value   12/03/2021 116 (H)   06/08/2020 126 (H)   06/03/2019 103 (H)     Wt Readings from Last 4 Encounters:   12/14/21 66.2 kg (146 lb)   06/23/21 71.2 kg (157 lb)   06/08/20 76.6 kg (168 lb 14.4 oz)   01/14/20 75.8 kg (167 lb)                 Hypertension Follow-up      Do you check your blood pressure regularly outside of the clinic? No     Are you following a low salt diet? Yes    Are your blood pressures ever more than 140 on the top number (systolic) OR more   than 90 on the bottom number (diastolic), for example 140/90? No    Hypothyroidism Follow-up      Since last visit, patient describes the following symptoms: Weight stable, no hair loss, no skin changes, no constipation, no loose stools          Review of Systems   Constitutional, HEENT, cardiovascular, pulmonary, gi and gu systems are negative, except as otherwise noted.      Objective    /70   Pulse 64   Temp 97.3  F (36.3  C) (Tympanic)   Resp 16   Ht 1.562 m (5' 1.5\")   Wt 66.2 kg (146 lb)   LMP 04/06/2016   SpO2 98%   BMI 27.14 kg/m    Body mass index is 27.14 kg/m .  Physical Exam   GENERAL: healthy, alert and no distress  NECK: no adenopathy, no asymmetry, masses, or scars and thyroid normal to palpation  RESP: lungs clear to auscultation - no rales, rhonchi or wheezes  CV: regular rate and rhythm, normal S1 S2, no S3 or S4, no murmur, click or rub, no peripheral edema and peripheral pulses strong  MS: no gross musculoskeletal defects noted, no edema  Diabetic foot exam: normal DP and PT pulses, no trophic changes or ulcerative lesions, normal sensory exam and normal monofilament exam    Lab on 12/03/2021   Component Date Value Ref Range Status     Hemoglobin A1C " 12/03/2021 6.5* 0.0 - 5.6 % Final    Normal <5.7%   Prediabetes 5.7-6.4%    Diabetes 6.5% or higher     Note: Adopted from ADA consensus guidelines.     Cholesterol 12/03/2021 211* <200 mg/dL Final     Triglycerides 12/03/2021 134  <150 mg/dL Final     Direct Measure HDL 12/03/2021 68  >=50 mg/dL Final     LDL Cholesterol Calculated 12/03/2021 116* <=100 mg/dL Final     Non HDL Cholesterol 12/03/2021 143* <130 mg/dL Final     Patient Fasting > 8hrs? 12/03/2021 Yes   Final     Creatinine Urine mg/dL 12/03/2021 66  mg/dL Final     Albumin Urine mg/L 12/03/2021 <5  mg/L Final     Albumin Urine mg/g Cr 12/03/2021    Final    Unable to calculate:  Urine creatinine or albumin value below detectable level

## 2021-12-15 ASSESSMENT — ASTHMA QUESTIONNAIRES: ACT_TOTALSCORE: 21

## 2021-12-18 DIAGNOSIS — J45.40 MODERATE PERSISTENT ASTHMA WITHOUT COMPLICATION: ICD-10-CM

## 2021-12-18 DIAGNOSIS — R09.81 NASAL CONGESTION: ICD-10-CM

## 2021-12-20 RX ORDER — ALBUTEROL SULFATE 90 UG/1
AEROSOL, METERED RESPIRATORY (INHALATION)
Qty: 25.5 G | Refills: 3 | Status: SHIPPED | OUTPATIENT
Start: 2021-12-20 | End: 2022-11-14

## 2021-12-20 RX ORDER — FLUTICASONE PROPIONATE 50 MCG
SPRAY, SUSPENSION (ML) NASAL
Qty: 32 G | Refills: 1 | Status: SHIPPED | OUTPATIENT
Start: 2021-12-20 | End: 2022-06-03

## 2021-12-20 RX ORDER — ALBUTEROL SULFATE 0.83 MG/ML
SOLUTION RESPIRATORY (INHALATION)
Qty: 1080 ML | Refills: 1 | Status: SHIPPED | OUTPATIENT
Start: 2021-12-20 | End: 2022-11-14

## 2021-12-20 RX ORDER — BUDESONIDE AND FORMOTEROL FUMARATE DIHYDRATE 160; 4.5 UG/1; UG/1
AEROSOL RESPIRATORY (INHALATION)
Qty: 30.6 G | Refills: 1 | Status: SHIPPED | OUTPATIENT
Start: 2021-12-20 | End: 2022-05-26

## 2021-12-22 ENCOUNTER — E-VISIT (OUTPATIENT)
Dept: FAMILY MEDICINE | Facility: CLINIC | Age: 56
End: 2021-12-22
Payer: COMMERCIAL

## 2021-12-22 DIAGNOSIS — Z20.822 SUSPECTED COVID-19 VIRUS INFECTION: Primary | ICD-10-CM

## 2021-12-22 PROCEDURE — 99421 OL DIG E/M SVC 5-10 MIN: CPT | Performed by: NURSE PRACTITIONER

## 2021-12-22 NOTE — PATIENT INSTRUCTIONS
Dear Rose Mary Constantino,    Your symptoms show that you may have coronavirus (COVID-19). This illness can cause fever, cough and trouble breathing. Many people get a mild case and get better on their own. Some people can get very sick.    Will I be tested for COVID-19?  We would like to test you for Covid-19 virus. I have placed orders for this test.     To schedule: go to your [email or web address] home page and scroll down to the section that says  You have an appointment that needs to be scheduled  and click the large green button that says  Schedule Now  and follow the steps to find the next available openings.    If you are unable to complete these [email or web address] scheduling steps, please call [phone] to schedule your testing.     Return to work/school/ guidance:  Please let your workplace manager and staffing office know when your quarantine ends     We can t give you an exact date as it depends on the above. You can calculate this on your own or work with your manager/staffing office to calculate this. (For example if you were exposed on 10/4, you would have to quarantine for 14 full days. That would be through 10/18. You could return on 10/19.)      If you receive a positive COVID-19 test result, follow the guidance of the those who are giving you the results. Usually the return to work is 10 (or in some cases 20 days from symptom onset.) If you work at [facility], you must also be cleared by Employee Occupational Health and Safety to return to work.        If you receive a negative COVID-19 test result and did not have a high risk exposure to someone with a known positive COVID-19 test, you can return to work once you're free of fever for 24 hours without fever-reducing medication and your symptoms are improving or resolved.      If you receive a negative COVID-19 test and If you had a high risk exposure to someone who has tested positive for COVID-19 then you can return to work 14 days after your last  contact with the positive individual    Note: If you have ongoing exposure to the covid positive person, this quarantine period may be more than 14 days. (For example, if you are continued to be exposed to your child who tested positive and cannot isolate from them, then the quarantine of 7-14 days can't start until your child is no longer contagious. This is typically 10 days from onset of the child's symptoms. So the total duration may be 17-24 days in this case.)    Sign up for [employer].   We know it's scary to hear that you might have COVID-19. We want to track your symptoms to make sure you're okay over the next 2 weeks. Please look for an email from [employer]--this is a free, online program that we'll use to keep in touch. To sign up, follow the link in the email you will receive. Learn more at [email or web address]    How can I take care of myself?    Get lots of rest. Drink extra fluids (unless a doctor has told you not to)    Take Tylenol (acetaminophen) or ibuprofen for fever or pain. If you have liver or kidney problems, ask your family doctor if it's okay to take Tylenol o ibuprofen    If you have other health problems (like cancer, heart failure, an organ transplant or severe kidney disease): Call your specialty clinic if you don't feel better in the next 2 days.    Know when to call 911. Emergency warning signs include:  o Trouble breathing or shortness of breath  o Pain or pressure in the chest that doesn't go away  o Feeling confused like you haven't felt before, or not being able to wake up  o Bluish-colored lips or face    Where can I get more information?  [facility] - About COVID-19:   [email or web address]    CDC - What to Do If You're Sick:   www.cdc.gov/coronavirus/2019-ncov/about/steps-when-sick.html

## 2021-12-23 ENCOUNTER — LAB (OUTPATIENT)
Dept: URGENT CARE | Facility: URGENT CARE | Age: 56
End: 2021-12-23
Attending: NURSE PRACTITIONER
Payer: COMMERCIAL

## 2021-12-23 DIAGNOSIS — Z20.822 SUSPECTED COVID-19 VIRUS INFECTION: ICD-10-CM

## 2021-12-23 PROCEDURE — U0003 INFECTIOUS AGENT DETECTION BY NUCLEIC ACID (DNA OR RNA); SEVERE ACUTE RESPIRATORY SYNDROME CORONAVIRUS 2 (SARS-COV-2) (CORONAVIRUS DISEASE [COVID-19]), AMPLIFIED PROBE TECHNIQUE, MAKING USE OF HIGH THROUGHPUT TECHNOLOGIES AS DESCRIBED BY CMS-2020-01-R: HCPCS

## 2021-12-23 PROCEDURE — U0005 INFEC AGEN DETEC AMPLI PROBE: HCPCS

## 2021-12-24 ENCOUNTER — TELEPHONE (OUTPATIENT)
Dept: FAMILY MEDICINE | Facility: CLINIC | Age: 56
End: 2021-12-24
Payer: COMMERCIAL

## 2021-12-24 LAB — SARS-COV-2 RNA RESP QL NAA+PROBE: POSITIVE

## 2021-12-24 NOTE — TELEPHONE ENCOUNTER
Called pt, no answer. Left voicemail with a detailed message regarding appt on Monday 12/27/21 for the Covid antibody treatment. SHANELL Bauman said we do not do the monoclonal antibody treatment here, pt will have to go to [facility] web site and find [facility] to get that treatment.

## 2021-12-27 ENCOUNTER — VIRTUAL VISIT (OUTPATIENT)
Dept: FAMILY MEDICINE | Facility: CLINIC | Age: 56
End: 2021-12-27
Payer: COMMERCIAL

## 2021-12-27 DIAGNOSIS — U07.1 INFECTION DUE TO 2019 NOVEL CORONAVIRUS: Primary | ICD-10-CM

## 2021-12-27 PROCEDURE — 99213 OFFICE O/P EST LOW 20 MIN: CPT | Mod: 95 | Performed by: NURSE PRACTITIONER

## 2021-12-27 NOTE — PATIENT INSTRUCTIONS
"  Patient Education   Discharge Instructions for COVID-19 Patients  You have--or may have--COVID-19. Please follow the instructions listed below.   If you have a weakened immune system, discuss with your doctor any other actions you need to take.  How can I protect others?  If you have symptoms (fever, cough, body aches or trouble breathing):    Stay home and away from others (self-isolate) until:  ? Your other symptoms have resolved (gotten better). And   ? You've had no fever--and no medicine that reduces fever--for 1 full day (24 hours). And   ? At least 10 days have passed since your symptoms started. (You may need to wait 20 days. Follow the advice of your care team.)  If you don't show symptoms, but testing showed that you have COVID-19:    Stay home and away from others (self-isolate) until at least 10 days have passed since the date of your first positive COVID-19 test.  During this time    Stay in your own room, even for meals. Use your own bathroom if you can.    Stay away from others in your home. No hugging, kissing or shaking hands. No visitors.    Don't go to work, school or anywhere else.    Clean \"high touch\" surfaces often (doorknobs, counters, handles). Use household cleaning spray or wipes.    You'll find a full list of  on the EPA website: www.epa.gov/pesticide-registration/list-n-disinfectants-use-against-sars-cov-2.    Cover your mouth and nose with a mask or other face covering to avoid spreading germs.    Wash your hands and face often. Use soap and water.    Caregivers in these groups are at risk for severe illness due to COVID-19:  ? People 65 years and older  ? People who live in a nursing home or long-term care facility  ? People with chronic disease (lung, heart, cancer, diabetes, kidney, liver, immunologic)  ? People who have a weakened immune system, including those who:    Are in cancer treatment    Take medicine that weakens the immune system, such as corticosteroids    Had a " bone marrow or organ transplant    Have an immune deficiency    Have poorly controlled HIV or AIDS    Are obese (body mass index of 40 or higher)    Smoke regularly    Caregivers should wear gloves while washing dishes, handling laundry and cleaning bedrooms and bathrooms.    Use caution when washing and drying laundry: Don't shake dirty laundry and use the warmest water setting that you can.    For more tips on managing your health at home, go to www.cdc.gov/coronavirus/2019-ncov/downloads/10Things.pdf.  How can I take care of myself at home?  1. Get lots of rest. Drink extra fluids (unless a doctor has told you not to).  2. Take Tylenol (acetaminophen) for fever or pain. If you have liver or kidney problems, ask your family doctor if it's okay to take Tylenol.   Adults can take either:   ? 650 mg (two 325 mg pills) every 4 to 6 hours, or   ? 1,000 mg (two 500 mg pills) every 8 hours as needed.  ? Note: Don't take more than 3,000 mg in one day. Acetaminophen is found in many medicines (both prescribed and over-the-counter medicines). Read all labels to be sure you don't take too much.   For children, check the Tylenol bottle for the right dose. The dose is based on the child's age or weight.  3. If you have other health problems (like cancer, heart failure, an organ transplant or severe kidney disease): Call your specialty clinic if you don't feel better in the next 2 days.  4. Know when to call 911. Emergency warning signs include:  ? Trouble breathing or shortness of breath  ? Pain or pressure in the chest that doesn't go away  ? Feeling confused like you haven't felt before, or not being able to wake up  ? Bluish-colored lips or face  5. Your doctor may have prescribed a blood thinner medicine. Follow their instructions.  Where can I get more information?     [facility] - About COVID-19:   [email or web address]    CDC - What to Do If You're Sick:  www.cdc.gov/coronavirus/2019-ncov/about/steps-when-sick.html    CDC - Ending Home Isolation: www.cdc.gov/coronavirus/2019-ncov/hcp/disposition-in-home-patients.html    CDC - Caring for Someone: www.cdc.gov/coronavirus/2019-ncov/if-you-are-sick/care-for-someone.html    [facility] - Interim Guidance for Hospital Discharge to Home: www.health.[facility].[place]./diseases/coronavirus/hcp/hospdischarge.pdf    Below are the COVID-19 hotlines at the [place] Department of Health ([facility]). Interpreters are available.  ? For health questions: Call 619-894-4314 or 1-235.489.6549 (7 a.m. to 7 p.m.)  ? For questions about schools and childcare: Call 971-178-3693 or 1-796.607.6776 (7 a.m. to 7 p.m.)    For informational purposes only. Not to replace the advice of your health care provider. Clinically reviewed by Dr. Joey Pickens.   Copyright   2020 [facility]. All rights reserved. [employer] 833254 - REV 01/05/21.

## 2021-12-27 NOTE — PROGRESS NOTES
Zoraida is a 56 year old who is being evaluated via a billable video visit.      How would you like to obtain your AVS? MyChart  If the video visit is dropped, the invitation should be resent by: Text to cell phone: [phone]  Will anyone else be joining your video visit? No      Video Start Time: 5:08 PM    Assessment & Plan     Infection due to 2019 novel coronavirus  Fully vaccinated.  Symptoms mild  Discussed adding the duoneb 3x daily.  Discussed quarantine.  Discussed symptomatic cares  Discussed return criteria.  Follow up immediately for any worsening.    The risks, benefits and treatment options of prescribed medications or other treatments have been discussed with the patient. The patient verbalized their understanding and should call or follow up if no improvement or if they develop further problems.    REDD uEbanks CNP  [facility]        Subjective   Zoraida is a 56 year old who presents for the following health issues     HPI       Concern for COVID-19/ recheck symptoms   + covid on 12/23  Concern about cough, turning into bronchitis  Drainage down her throat  Triggering her anxiety  About how many days ago did these symptoms start? 12/21/21  Is this your first visit for this illness? No   How would you describe your symptoms since your last visit? My symptoms have improved  In the 14 days before your symptoms started, have you had close contact with someone with COVID-19 (Coronavirus)? I do not know. Daughter had it a month ago  Do you have a fever or chills? No  Are you having new or worsening difficulty breathing? No  Do you have new or worsening cough? Yes, I am coughing up mucus.  Have you had any new or unexplained body aches? No    Have you experienced any of the following NEW symptoms?    Headache: No    Sore throat: No    Loss of taste or smell: seems like it comes and goes    Chest pain: No, sometimes feels tight, but might be her anxiety    Diarrhea:  No    Rash: No  What treatments have you tried? uma pot;  Renata crawford cold medicine at night  Who do you live with? , daughter                Review of Systems   Constitutional, HEENT, cardiovascular, pulmonary, gi and gu systems are negative, except as otherwise noted.      Objective           Vitals:  No vitals were obtained today due to virtual visit.    Physical Exam   GENERAL: Healthy, alert and no distress  EYES: Eyes grossly normal to inspection.  No discharge or erythema, or obvious scleral/conjunctival abnormalities.  RESP: No audible wheeze, cough, or visible cyanosis.  No visible retractions or increased work of breathing.    SKIN: Visible skin clear. No significant rash, abnormal pigmentation or lesions.  NEURO: Cranial nerves grossly intact.  Mentation and speech appropriate for age.  PSYCH: Mentation appears normal, affect normal/bright, judgement and insight intact, normal speech and appearance well-groomed.                Video-Visit Details    Type of service:  Video Visit    Video End Time:5:19 PM    Originating Location (pt. Location): Home    Distant Location (provider location):  [facility]     Platform used for Video Visit: [employer]

## 2022-04-16 ENCOUNTER — HEALTH MAINTENANCE LETTER (OUTPATIENT)
Age: 57
End: 2022-04-16

## 2022-05-26 ENCOUNTER — TELEPHONE (OUTPATIENT)
Dept: FAMILY MEDICINE | Facility: CLINIC | Age: 57
End: 2022-05-26
Payer: COMMERCIAL

## 2022-05-26 DIAGNOSIS — J45.40 MODERATE PERSISTENT ASTHMA WITHOUT COMPLICATION: ICD-10-CM

## 2022-05-26 RX ORDER — BUDESONIDE AND FORMOTEROL FUMARATE DIHYDRATE 160; 4.5 UG/1; UG/1
AEROSOL RESPIRATORY (INHALATION)
Qty: 30.6 G | Refills: 3 | Status: SHIPPED | OUTPATIENT
Start: 2022-05-26 | End: 2023-01-03

## 2022-05-26 NOTE — TELEPHONE ENCOUNTER
Patient Quality Outreach    Patient is due for the following:   Breast Cancer Screening - Mammogram  Physical  - Due after 6/23/22    NEXT STEPS:   Schedule a yearly physical   Schedule a mammogram    Type of outreach:    Sent letter.      Questions for provider review:    None     Tran Ambrose, [facility]

## 2022-05-26 NOTE — LETTER
May 26, 2022      Rose Mary Constantino  [address] [facility] [address]      Your healthcare team cares about your health. To provide you with the best care,   we have reviewed your chart and based on our findings, we see that you are due to:     - BREAST CANCER SCREENING:  Schedule Annual Mammogram. Breast center scheduling number - [phone] or schedule in MyChart (self referall)   Physical exam due after 6/23/22    If you have already completed these items, please contact the clinic via phone or   [employer] so your care team can review and update your records. Thank you for   choosing [facility] for your healthcare needs. For any questions,   concerns, or to schedule an appointment please contact the clinic.       Healthy Regards,      Your [facility] Team

## 2022-05-29 DIAGNOSIS — E11.9 TYPE 2 DIABETES MELLITUS WITHOUT COMPLICATION, WITHOUT LONG-TERM CURRENT USE OF INSULIN (H): ICD-10-CM

## 2022-06-01 RX ORDER — METFORMIN HCL 500 MG
TABLET, EXTENDED RELEASE 24 HR ORAL
Qty: 90 TABLET | Refills: 3 | OUTPATIENT
Start: 2022-06-01

## 2022-06-01 NOTE — TELEPHONE ENCOUNTER
Pt due for labs, appt tomorrow and follow up with provider on 6.3 to address.     Ana Cristina Jones RN MSN

## 2022-06-02 ENCOUNTER — LAB (OUTPATIENT)
Dept: LAB | Facility: CLINIC | Age: 57
End: 2022-06-02
Payer: COMMERCIAL

## 2022-06-02 DIAGNOSIS — I10 HYPERTENSION, UNSPECIFIED TYPE: ICD-10-CM

## 2022-06-02 DIAGNOSIS — E11.9 TYPE 2 DIABETES MELLITUS WITHOUT COMPLICATION, WITHOUT LONG-TERM CURRENT USE OF INSULIN (H): ICD-10-CM

## 2022-06-02 DIAGNOSIS — E03.9 ACQUIRED HYPOTHYROIDISM: ICD-10-CM

## 2022-06-02 LAB
ANION GAP SERPL CALCULATED.3IONS-SCNC: 5 MMOL/L (ref 3–14)
BUN SERPL-MCNC: 10 MG/DL (ref 7–30)
CALCIUM SERPL-MCNC: 9 MG/DL (ref 8.5–10.1)
CHLORIDE BLD-SCNC: 105 MMOL/L (ref 94–109)
CO2 SERPL-SCNC: 28 MMOL/L (ref 20–32)
CREAT SERPL-MCNC: 0.68 MG/DL (ref 0.52–1.04)
GFR SERPL CREATININE-BSD FRML MDRD: >90 ML/MIN/1.73M2
GLUCOSE BLD-MCNC: 113 MG/DL (ref 70–99)
HBA1C MFR BLD: 6.7 % (ref 0–5.6)
POTASSIUM BLD-SCNC: 4 MMOL/L (ref 3.4–5.3)
SODIUM SERPL-SCNC: 138 MMOL/L (ref 133–144)
TSH SERPL DL<=0.005 MIU/L-ACNC: 2.9 MU/L (ref 0.4–4)

## 2022-06-02 PROCEDURE — 80048 BASIC METABOLIC PNL TOTAL CA: CPT

## 2022-06-02 PROCEDURE — 80061 LIPID PANEL: CPT

## 2022-06-02 PROCEDURE — 83036 HEMOGLOBIN GLYCOSYLATED A1C: CPT

## 2022-06-02 PROCEDURE — 36415 COLL VENOUS BLD VENIPUNCTURE: CPT

## 2022-06-02 PROCEDURE — 84443 ASSAY THYROID STIM HORMONE: CPT

## 2022-06-03 ENCOUNTER — OFFICE VISIT (OUTPATIENT)
Dept: FAMILY MEDICINE | Facility: CLINIC | Age: 57
End: 2022-06-03
Payer: COMMERCIAL

## 2022-06-03 VITALS
TEMPERATURE: 97.4 F | RESPIRATION RATE: 12 BRPM | HEART RATE: 65 BPM | OXYGEN SATURATION: 97 % | HEIGHT: 61 IN | SYSTOLIC BLOOD PRESSURE: 122 MMHG | DIASTOLIC BLOOD PRESSURE: 72 MMHG | BODY MASS INDEX: 27.56 KG/M2 | WEIGHT: 146 LBS

## 2022-06-03 DIAGNOSIS — Z00.00 ROUTINE GENERAL MEDICAL EXAMINATION AT A HEALTH CARE FACILITY: Primary | ICD-10-CM

## 2022-06-03 DIAGNOSIS — L30.1 DYSHIDROTIC ECZEMA: ICD-10-CM

## 2022-06-03 DIAGNOSIS — J45.40 MODERATE PERSISTENT ASTHMA WITHOUT COMPLICATION: ICD-10-CM

## 2022-06-03 DIAGNOSIS — I10 ESSENTIAL HYPERTENSION WITH GOAL BLOOD PRESSURE LESS THAN 140/90: ICD-10-CM

## 2022-06-03 DIAGNOSIS — E03.9 ACQUIRED HYPOTHYROIDISM: ICD-10-CM

## 2022-06-03 DIAGNOSIS — E78.5 HYPERLIPIDEMIA LDL GOAL <70: ICD-10-CM

## 2022-06-03 DIAGNOSIS — E11.9 TYPE 2 DIABETES MELLITUS WITHOUT COMPLICATION, WITHOUT LONG-TERM CURRENT USE OF INSULIN (H): ICD-10-CM

## 2022-06-03 DIAGNOSIS — R09.81 NASAL CONGESTION: ICD-10-CM

## 2022-06-03 LAB
CHOLEST SERPL-MCNC: 207 MG/DL
HDLC SERPL-MCNC: 59 MG/DL
LDLC SERPL CALC-MCNC: 89 MG/DL
NONHDLC SERPL-MCNC: 148 MG/DL
TRIGL SERPL-MCNC: 295 MG/DL

## 2022-06-03 PROCEDURE — 99396 PREV VISIT EST AGE 40-64: CPT | Performed by: NURSE PRACTITIONER

## 2022-06-03 PROCEDURE — 99214 OFFICE O/P EST MOD 30 MIN: CPT | Mod: 25 | Performed by: NURSE PRACTITIONER

## 2022-06-03 RX ORDER — LEVOTHYROXINE SODIUM 25 UG/1
25 TABLET ORAL DAILY
Qty: 90 TABLET | Refills: 3 | Status: SHIPPED | OUTPATIENT
Start: 2022-06-03 | End: 2023-06-01

## 2022-06-03 RX ORDER — IPRATROPIUM BROMIDE AND ALBUTEROL SULFATE 2.5; .5 MG/3ML; MG/3ML
1 SOLUTION RESPIRATORY (INHALATION) EVERY 6 HOURS PRN
Qty: 720 ML | Refills: 5 | Status: SHIPPED | OUTPATIENT
Start: 2022-06-03 | End: 2023-06-05

## 2022-06-03 RX ORDER — LISINOPRIL 10 MG/1
10 TABLET ORAL DAILY
Qty: 90 TABLET | Refills: 3 | Status: SHIPPED | OUTPATIENT
Start: 2022-06-03 | End: 2023-06-01

## 2022-06-03 RX ORDER — CLOBETASOL PROPIONATE 0.5 MG/G
OINTMENT TOPICAL
Qty: 60 G | Refills: 5 | Status: SHIPPED | OUTPATIENT
Start: 2022-06-03 | End: 2024-06-07

## 2022-06-03 RX ORDER — FLUTICASONE PROPIONATE 50 MCG
SPRAY, SUSPENSION (ML) NASAL
Qty: 32 G | Refills: 1 | Status: SHIPPED | OUTPATIENT
Start: 2022-06-03 | End: 2023-06-05

## 2022-06-03 RX ORDER — METFORMIN HCL 500 MG
500 TABLET, EXTENDED RELEASE 24 HR ORAL
Qty: 90 TABLET | Refills: 3 | Status: SHIPPED | OUTPATIENT
Start: 2022-06-03 | End: 2023-05-25

## 2022-06-03 RX ORDER — ATORVASTATIN CALCIUM 10 MG/1
10 TABLET, FILM COATED ORAL DAILY
Qty: 90 TABLET | Refills: 3 | Status: SHIPPED | OUTPATIENT
Start: 2022-06-03 | End: 2023-06-05

## 2022-06-03 ASSESSMENT — ENCOUNTER SYMPTOMS
MYALGIAS: 0
DYSURIA: 0
JOINT SWELLING: 0
DIZZINESS: 0
COUGH: 0
EYE PAIN: 0
NERVOUS/ANXIOUS: 0
HEADACHES: 0
ARTHRALGIAS: 0
NAUSEA: 0
SORE THROAT: 0
FREQUENCY: 0
PALPITATIONS: 0
HEMATURIA: 0
FEVER: 0
DIARRHEA: 0
ABDOMINAL PAIN: 0
HEMATOCHEZIA: 0
CONSTIPATION: 0
PARESTHESIAS: 0
WEAKNESS: 0
CHILLS: 0
HEARTBURN: 0
BREAST MASS: 0
SHORTNESS OF BREATH: 0

## 2022-06-03 ASSESSMENT — ASTHMA QUESTIONNAIRES
ACT_TOTALSCORE: 22
QUESTION_5 LAST FOUR WEEKS HOW WOULD YOU RATE YOUR ASTHMA CONTROL: WELL CONTROLLED
QUESTION_2 LAST FOUR WEEKS HOW OFTEN HAVE YOU HAD SHORTNESS OF BREATH: ONCE OR TWICE A WEEK
QUESTION_3 LAST FOUR WEEKS HOW OFTEN DID YOUR ASTHMA SYMPTOMS (WHEEZING, COUGHING, SHORTNESS OF BREATH, CHEST TIGHTNESS OR PAIN) WAKE YOU UP AT NIGHT OR EARLIER THAN USUAL IN THE MORNING: NOT AT ALL
QUESTION_1 LAST FOUR WEEKS HOW MUCH OF THE TIME DID YOUR ASTHMA KEEP YOU FROM GETTING AS MUCH DONE AT WORK, SCHOOL OR AT HOME: NONE OF THE TIME
QUESTION_4 LAST FOUR WEEKS HOW OFTEN HAVE YOU USED YOUR RESCUE INHALER OR NEBULIZER MEDICATION (SUCH AS ALBUTEROL): ONCE A WEEK OR LESS
ACT_TOTALSCORE: 22

## 2022-06-03 ASSESSMENT — PAIN SCALES - GENERAL: PAINLEVEL: NO PAIN (0)

## 2022-06-03 NOTE — PROGRESS NOTES
SUBJECTIVE:   CC: Rose Mary Constantino is an 57 year old woman who presents for preventive health visit.       Yes-  Healthy Habits:     Getting at least 3 servings of Calcium per day:  NO    Bi-annual eye exam:  NO    Dental care twice a year:  Yes    Sleep apnea or symptoms of sleep apnea:  None    Diet:  Low salt, Low fat/cholesterol, Diabetic and Carbohydrate counting    Frequency of exercise:  2-3 days/week    Duration of exercise:  Less than 15 minutes    Taking medications regularly:  Yes    Medication side effects:  None    PHQ-2 Total Score: 0    Additional concerns today:  No      PROBLEMS TO ADD ON...  Diabetes Follow-up    How often are you checking your blood sugar? A few times a week  What time of day are you checking your blood sugars (select all that apply)?  Before and after meals  Have you had any blood sugars above 200?  No  Have you had any blood sugars below 70?  No    What symptoms do you notice when your blood sugar is low?  Shaky and head feels funny    What concerns do you have today about your diabetes? None    Would like doctors note so she can bring her own lunch to work     Do you have any of these symptoms? (Select all that apply)  No numbness or tingling in feet.  No redness, sores or blisters on feet.  No complaints of excessive thirst.  No reports of blurry vision.  No significant changes to weight.    Have you had a diabetic eye exam in the last 12 months? No        BP Readings from Last 2 Encounters:   06/03/22 122/72   12/14/21 122/70     Hemoglobin A1C POCT (%)   Date Value   06/23/2021 7.0 (H)   06/10/2017 6.0     Hemoglobin A1C (%)   Date Value   06/02/2022 6.7 (H)   12/03/2021 6.5 (H)     LDL Cholesterol Calculated (mg/dL)   Date Value   12/03/2021 116 (H)   06/08/2020 126 (H)   06/03/2019 103 (H)               Hypertension Follow-up      Do you check your blood pressure regularly outside of the clinic? Yes     Are you following a low salt diet? Yes    Are your blood  pressures ever more than 140 on the top number (systolic) OR more   than 90 on the bottom number (diastolic), for example 140/90? No    Asthma Follow-Up    Was ACT completed today?    Yes    ACT Total Scores 6/3/2022   ACT TOTAL SCORE -   ASTHMA ER VISITS -   ASTHMA HOSPITALIZATIONS -   ACT TOTAL SCORE (Goal Greater than or Equal to 20) 22   In the past 12 months, how many times did you visit the emergency room for your asthma without being admitted to the hospital? 0   In the past 12 months, how many times were you hospitalized overnight because of your asthma? 0          How many days per week do you miss taking your asthma controller medication?  0    Please describe any recent triggers for your asthma: upper respiratory infections, dust mites, mold and humidity    Have you had any Emergency Room Visits, Urgent Care Visits, or Hospital Admissions since your last office visit?  No    Hypothyroidism Follow-up      Since last visit, patient describes the following symptoms: Weight stable, no hair loss, no skin changes, no constipation, no loose stools      Today's PHQ-2 Score:   PHQ-2 (  [employer]) 6/3/2022   Q1: Little interest or pleasure in doing things 0   Q2: Feeling down, depressed or hopeless 0   PHQ-2 Score 0   PHQ-2 Total Score (12-17 Years)- Positive if 3 or more points; Administer PHQ-A if positive -   Q1: Little interest or pleasure in doing things Not at all   Q2: Feeling down, depressed or hopeless Not at all   PHQ-2 Score 0       Abuse: Current or Past (Physical, Sexual or Emotional) - No  Do you feel safe in your environment? Yes        Social History     Tobacco Use     Smoking status: Current Every Day Smoker     Packs/day: 0.25     Years: 25.00     Pack years: 6.25     Types: Cigarettes     Last attempt to quit: 3/14/2016     Years since quittin.2     Smokeless tobacco: Never Used     Tobacco comment: 5 cigarettes per day   Substance Use Topics     Alcohol use: Not Currently     Comment: 1  beer per month, usually none     If you drink alcohol do you typically have >3 drinks per day or >7 drinks per week? No    Alcohol Use 6/3/2022   Prescreen: >3 drinks/day or >7 drinks/week? No   Prescreen: >3 drinks/day or >7 drinks/week? -       Reviewed orders with patient.  Reviewed health maintenance and updated orders accordingly - Yes      Breast Cancer Screening:    FHS-7:   Breast CA Risk Assessment (FHS-7) 6/3/2022   Did any of your first-degree relatives have breast or ovarian cancer? No   Did any of your relatives have bilateral breast cancer? No   Did any man in your family have breast cancer? No   Did any woman in your family have breast and ovarian cancer? No   Did any woman in your family have breast cancer before age 50 y? No   Do you have 2 or more relatives with breast and/or ovarian cancer? No   Do you have 2 or more relatives with breast and/or bowel cancer? Yes       Mammogram Screening: Recommended annual mammography  Pertinent mammograms are reviewed under the imaging tab.    History of abnormal Pap smear: YES - once in her 30s - all normal since  PAP / HPV Latest Ref Rng & Units 6/23/2021 6/5/2018 7/24/2015   PAP (Historical) - NIL NIL NIL   HPV16 NEG:Negative Negative Negative -   HPV18 NEG:Negative Negative Negative -   HRHPV NEG:Negative Negative Negative -     Reviewed and updated as needed this visit by clinical staff   Tobacco  Allergies  Meds  Problems  Med Hx  Surg Hx  Fam Hx  Soc   Hx          Reviewed and updated as needed this visit by Provider   Tobacco  Allergies  Meds  Problems  Med Hx  Surg Hx  Fam Hx               Review of Systems   Constitutional: Negative for chills and fever.   HENT: Negative for congestion, ear pain, hearing loss and sore throat.    Eyes: Negative for pain and visual disturbance.   Respiratory: Negative for cough and shortness of breath.    Cardiovascular: Negative for chest pain, palpitations and peripheral edema.   Gastrointestinal:  "Negative for abdominal pain, constipation, diarrhea, heartburn, hematochezia and nausea.   Breasts:  Negative for tenderness, breast mass and discharge.   Genitourinary: Negative for dysuria, frequency, genital sores, hematuria, pelvic pain, urgency, vaginal bleeding and vaginal discharge.   Musculoskeletal: Negative for arthralgias, joint swelling and myalgias.   Skin: Negative for rash.   Neurological: Negative for dizziness, weakness, headaches and paresthesias.   Psychiatric/Behavioral: Negative for mood changes. The patient is not nervous/anxious.           OBJECTIVE:   /72 (BP Location: Right arm, Patient Position: Sitting, Cuff Size: Adult Regular)   Pulse 65   Temp 97.4  F (36.3  C) (Tympanic)   Resp 12   Ht 1.549 m (5' 1\")   Wt 66.2 kg (146 lb)   LMP 04/06/2016   SpO2 97%   BMI 27.59 kg/m    Physical Exam  GENERAL APPEARANCE: healthy, alert and no distress  EYES: Eyes grossly normal to inspection, PERRL and conjunctivae and sclerae normal  HENT: ear canals and TM's normal, nose and mouth without ulcers or lesions, oropharynx clear and oral mucous membranes moist  NECK: no adenopathy, no asymmetry, masses, or scars and thyroid normal to palpation  RESP: lungs clear to auscultation - no rales, rhonchi or wheezes  BREAST: normal without masses, tenderness or nipple discharge and no palpable axillary masses or adenopathy  CV: regular rate and rhythm, normal S1 S2, no S3 or S4, no murmur, click or rub, no peripheral edema and peripheral pulses strong  ABDOMEN: soft, nontender, no hepatosplenomegaly, no masses and bowel sounds normal  MS: no musculoskeletal defects are noted and gait is age appropriate without ataxia  SKIN: no suspicious lesions or rashes  NEURO: Normal strength and tone, sensory exam grossly normal, mentation intact and speech normal  PSYCH: mentation appears normal and affect normal/bright        ASSESSMENT/PLAN:       ICD-10-CM    1. Routine general medical examination at a " "health care facility    Z00.00    2. Type 2 diabetes mellitus without complication, without long-term current use of insulin (H)  E11.9 Well controlled  Follow up in 6 months.  metFORMIN (GLUCOPHAGE XR) 500 MG 24 hr tablet     Lipid panel reflex to direct LDL Non-fasting     OFFICE/OUTPT VISIT,EST,LEVL III     3. Hyperlipidemia LDL goal <70  E78.5 New diagnosis.  Start atorvastatin (LIPITOR) 10 MG tablet     OFFICE/OUTPT VISIT,EST,LEVL III     4. Acquired hypothyroidism  E03.9 Well controlled.  levothyroxine (SYNTHROID/LEVOTHROID) 25 MCG tablet     OFFICE/OUTPT VISIT,EST,LEVL III     5. Essential hypertension with goal blood pressure less than 140/90  I10 Well controlled.  lisinopril (ZESTRIL) 10 MG tablet     OFFICE/OUTPT VISIT,EST,LEVL III     6. Moderate persistent asthma without complication  J45.40 Well controlled.  ipratropium - albuterol 0.5 mg/2.5 mg/3 mL (DUONEB) 0.5-2.5 (3) MG/3ML neb solution     OFFICE/OUTPT VISIT,EST,LEVL III     7. Dyshidrotic eczema  L30.1 Refilled clobetasol (TEMOVATE) 0.05 % external ointment     OFFICE/OUTPT VISIT,EST,LEVL III     8. Nasal congestion  R09.81 Refilled fluticasone (FLONASE) 50 MCG/ACT nasal spray     OFFICE/OUTPT VISIT,EST,LEVL III       Patient has been advised of split billing requirements and indicates understanding: Yes by AFR and CMA    COUNSELING:  Reviewed preventive health counseling, as reflected in patient instructions    Estimated body mass index is 27.59 kg/m  as calculated from the following:    Height as of this encounter: 1.549 m (5' 1\").    Weight as of this encounter: 66.2 kg (146 lb).    Weight management plan: Discussed healthy diet and exercise guidelines    She reports that she has been smoking cigarettes. She has a 6.25 pack-year smoking history. She has never used smokeless tobacco.      The risks, benefits and treatment options of prescribed medications or other treatments have been discussed with the patient. The patient verbalized their " understanding and should call or follow up if no improvement or if they develop further problems.    REDD [facility]

## 2022-06-03 NOTE — PATIENT INSTRUCTIONS
Make an appointment for a diabetic eye exam and have them send a copy of the exam report.      Add atorvastatin for cholesterol - 10 mg in the evening.

## 2022-06-03 NOTE — LETTER
[facility]  [address] [address]  [phone]          Nery 3, 2022    RE:  Rose Mary Constantino                                                                                                                                                       [address] [address]            To whom it may concern:    Rose Mary Constantino is a patient of mine. Rose Mary has diabetes and needs to bring her own food to work for medical reasons.      Sincerely,          Soledad Bauman, CNP

## 2022-07-07 ENCOUNTER — HOSPITAL ENCOUNTER (OUTPATIENT)
Dept: MAMMOGRAPHY | Facility: CLINIC | Age: 57
Discharge: HOME | End: 2022-07-07
Attending: NURSE PRACTITIONER | Admitting: NURSE PRACTITIONER
Payer: COMMERCIAL

## 2022-07-07 DIAGNOSIS — Z12.31 VISIT FOR SCREENING MAMMOGRAM: ICD-10-CM

## 2022-07-07 DIAGNOSIS — Z01.419 ENCOUNTER FOR GYNECOLOGICAL EXAMINATION WITHOUT ABNORMAL FINDING: ICD-10-CM

## 2022-07-07 PROCEDURE — 77067 SCR MAMMO BI INCL CAD: CPT

## 2022-09-09 ENCOUNTER — TRANSFERRED RECORDS (OUTPATIENT)
Dept: HEALTH INFORMATION MANAGEMENT | Facility: CLINIC | Age: 57
End: 2022-09-09

## 2022-10-22 ENCOUNTER — HEALTH MAINTENANCE LETTER (OUTPATIENT)
Age: 57
End: 2022-10-22

## 2022-11-01 ENCOUNTER — E-VISIT (OUTPATIENT)
Dept: FAMILY MEDICINE | Facility: CLINIC | Age: 57
End: 2022-11-01
Payer: COMMERCIAL

## 2022-11-01 DIAGNOSIS — J01.90 ACUTE BACTERIAL SINUSITIS: Primary | ICD-10-CM

## 2022-11-01 DIAGNOSIS — B96.89 ACUTE BACTERIAL SINUSITIS: Primary | ICD-10-CM

## 2022-11-01 PROCEDURE — 99421 OL DIG E/M SVC 5-10 MIN: CPT | Performed by: NURSE PRACTITIONER

## 2022-11-02 RX ORDER — PREDNISONE 20 MG/1
40 TABLET ORAL DAILY
Qty: 10 TABLET | Refills: 0 | Status: SHIPPED | OUTPATIENT
Start: 2022-11-02 | End: 2022-11-07

## 2022-11-02 RX ORDER — DOXYCYCLINE HYCLATE 100 MG
100 TABLET ORAL 2 TIMES DAILY
Qty: 14 TABLET | Refills: 0 | Status: SHIPPED | OUTPATIENT
Start: 2022-11-02 | End: 2022-11-09

## 2022-11-02 NOTE — PATIENT INSTRUCTIONS
Sinusitis (Antibiotic Treatment)    The sinuses are air-filled spaces within the bones of the face. They connect to the inside of the nose. Sinusitis is an inflammation of the tissue that lines the sinuses. Sinusitis can occur during a cold. It can also happen due to allergies to pollens and other particles in the air. Sinusitis can cause symptoms of sinus congestion and a feeling of fullness. A sinus infection causes fever, headache, and facial pain. There is often green or yellow fluid draining from the nose or into the back of the throat (post-nasal drip). You have been given antibiotics to treat this condition.   Home care  Take the full course of antibiotics as instructed. Don't stop taking them, even when you feel better.  Drink plenty of water, hot tea, and other liquids as directed by the healthcare provider. This may help thin nasal mucus. It also may help your sinuses drain fluids.  Heat may help soothe painful areas of your face. Use a towel soaked in hot water. Or,  the shower and direct the warm spray onto your face. Using a vaporizer along with a menthol rub at night may also help soothe symptoms.   An expectorant with guaifenesin may help thin nasal mucus and help your sinuses drain fluids. Talk with your provider or pharmacists before taking an over-the-counter (OTC) medicine if you have any questions about it or its side effects..  You can use an OTC decongestant, unless a similar medicine was prescribed to you. Nasal sprays work the fastest. Use one that contains phenylephrine or oxymetazoline. First blow your nose gently. Then use the spray. Don't use these medicines more often than directed on the label. If you do, your symptoms may get worse. You may also take pills that contain pseudoephedrine. Don t use products that combine multiple medicines. This is because side effects may be increased. Read labels. You can also ask the pharmacist for help. (People with high blood pressure  should not use decongestants. They can raise blood pressure.) Talk with your provider or pharmacist if you have any questions about the medicine..  OTC antihistamines may help if allergies contributed to your sinusitis. Talk with your provider or pharmacist if you have any questions about the medicine..  Don't use nasal rinses or irrigation during an acute sinus infection, unless your healthcare provider tells you to. Rinsing may spread the infection to other areas in your sinuses.  Use acetaminophen or ibuprofen to control pain, unless another pain medicine was prescribed to you. If you have chronic liver or kidney disease or ever had a stomach ulcer, talk with your healthcare provider before using these medicines. Never give aspirin to anyone under age 18 who is ill with a fever. It may cause severe liver damage.  Don't smoke. This can make symptoms worse.    Follow-up care  Follow up with your healthcare provider, or as advised.   When to seek medical advice  Call your healthcare provider if any of these occur:   Facial pain or headache that gets worse  Stiff neck  Unusual drowsiness or confusion  Swelling of your forehead or eyelids  Symptoms don't go away in 10 days  Vision problems, such as blurred or double vision  Fever of 100.4 F (38 C) or higher, or as directed by your healthcare provider  Call 911  Call 911 if any of these occur:   Seizure  Trouble breathing  Feeling dizzy or faint  Fingernails, skin or lips look blue, purple , or gray  Prevention  Here are steps you can take to help prevent an infection:   Keep good hand washing habits.  Don t have close contact with people who have sore throats, colds, or other upper respiratory infections.  Don t smoke, and stay away from secondhand smoke.  Stay up to date with of your vaccines.  [employer] last reviewed this educational content on 12/1/2019 2000-2021 [employer]. All rights reserved. This information is not intended as a substitute for  professional medical care. Always follow your healthcare professional's instructions.        Dear Rose Mary Constantino    After reviewing your responses, I've been able to diagnose you with?a sinus infection caused by bacteria.?     Based on your responses and diagnosis, I have prescribed doxycycline to treat your symptoms. I have sent this to your pharmacy.?     It is also important to stay well hydrated, get lots of rest and take over-the-counter decongestants,?tylenol?or ibuprofen if you?are able to?take those medications per your primary care provider to help relieve discomfort.?     It is important that you take?all of?your prescribed medication even if your symptoms are improving after a few doses.? Taking?all of?your medicine helps prevent the symptoms from returning.?     If your symptoms worsen, you develop severe headache, vomiting, high fever (>102), or are not improving in 7 days, please contact your primary care provider for an appointment or visit any of our convenient Walk-in Care or Urgent Care Centers to be seen which can be found on our website?here.?     Thanks again for choosing?us?as your health care partner,?   ?  Soledad Bauman, REDD CNP?

## 2022-11-07 ENCOUNTER — APPOINTMENT (OUTPATIENT)
Dept: GENERAL RADIOLOGY | Facility: CLINIC | Age: 57
End: 2022-11-07
Attending: PHYSICIAN ASSISTANT
Payer: COMMERCIAL

## 2022-11-07 ENCOUNTER — HOSPITAL ENCOUNTER (EMERGENCY)
Facility: CLINIC | Age: 57
Discharge: HOME | End: 2022-11-07
Attending: PHYSICIAN ASSISTANT | Admitting: PHYSICIAN ASSISTANT
Payer: COMMERCIAL

## 2022-11-07 ENCOUNTER — E-VISIT (OUTPATIENT)
Dept: FAMILY MEDICINE | Facility: CLINIC | Age: 57
End: 2022-11-07
Payer: COMMERCIAL

## 2022-11-07 VITALS
TEMPERATURE: 97.4 F | BODY MASS INDEX: 27.56 KG/M2 | OXYGEN SATURATION: 96 % | HEART RATE: 81 BPM | DIASTOLIC BLOOD PRESSURE: 72 MMHG | HEIGHT: 61 IN | RESPIRATION RATE: 18 BRPM | SYSTOLIC BLOOD PRESSURE: 152 MMHG | WEIGHT: 146 LBS

## 2022-11-07 DIAGNOSIS — J45.901 EXACERBATION OF ASTHMA, UNSPECIFIED ASTHMA SEVERITY, UNSPECIFIED WHETHER PERSISTENT: ICD-10-CM

## 2022-11-07 DIAGNOSIS — R05.1 ACUTE COUGH: ICD-10-CM

## 2022-11-07 DIAGNOSIS — R05.9 COUGH, UNSPECIFIED TYPE: Primary | ICD-10-CM

## 2022-11-07 DIAGNOSIS — N89.8 VAGINAL IRRITATION: ICD-10-CM

## 2022-11-07 LAB
ALBUMIN UR-MCNC: NEGATIVE MG/DL
APPEARANCE UR: CLEAR
BACTERIA #/AREA URNS HPF: ABNORMAL /HPF
BILIRUB UR QL STRIP: NEGATIVE
CLUE CELLS: ABNORMAL
COLOR UR AUTO: YELLOW
GLUCOSE UR STRIP-MCNC: 250 MG/DL
HGB UR QL STRIP: ABNORMAL
KETONES UR STRIP-MCNC: NEGATIVE MG/DL
LEUKOCYTE ESTERASE UR QL STRIP: ABNORMAL
MUCOUS THREADS #/AREA URNS LPF: PRESENT /LPF
NITRATE UR QL: NEGATIVE
PH UR STRIP: 6 [PH] (ref 5–7)
RBC URINE: 1 /HPF
SP GR UR STRIP: 1.02 (ref 1–1.03)
SQUAMOUS EPITHELIAL: 1 /HPF
TRICHOMONAS, WET PREP: ABNORMAL
UROBILINOGEN UR STRIP-MCNC: NORMAL MG/DL
WBC URINE: 1 /HPF
WBC'S/HIGH POWER FIELD, WET PREP: ABNORMAL
YEAST, WET PREP: ABNORMAL

## 2022-11-07 PROCEDURE — G0463 HOSPITAL OUTPT CLINIC VISIT: HCPCS | Mod: 25 | Performed by: PHYSICIAN ASSISTANT

## 2022-11-07 PROCEDURE — 99214 OFFICE O/P EST MOD 30 MIN: CPT | Performed by: PHYSICIAN ASSISTANT

## 2022-11-07 PROCEDURE — 87210 SMEAR WET MOUNT SALINE/INK: CPT | Performed by: PHYSICIAN ASSISTANT

## 2022-11-07 PROCEDURE — 81001 URINALYSIS AUTO W/SCOPE: CPT | Performed by: PHYSICIAN ASSISTANT

## 2022-11-07 PROCEDURE — 87086 URINE CULTURE/COLONY COUNT: CPT | Performed by: PHYSICIAN ASSISTANT

## 2022-11-07 PROCEDURE — 99207 PR NON-BILLABLE SERV PER CHARTING: CPT | Performed by: NURSE PRACTITIONER

## 2022-11-07 PROCEDURE — 71046 X-RAY EXAM CHEST 2 VIEWS: CPT

## 2022-11-07 RX ORDER — NYSTATIN 100000 U/G
OINTMENT TOPICAL 2 TIMES DAILY
Qty: 15 G | Refills: 0 | Status: SHIPPED | OUTPATIENT
Start: 2022-11-07 | End: 2023-06-05

## 2022-11-07 RX ORDER — PREDNISONE 10 MG/1
TABLET ORAL
Qty: 19 TABLET | Refills: 0 | Status: SHIPPED | OUTPATIENT
Start: 2022-11-07 | End: 2022-11-17

## 2022-11-07 RX ORDER — FLUCONAZOLE 150 MG/1
TABLET ORAL
Qty: 1 TABLET | Refills: 0 | Status: SHIPPED | OUTPATIENT
Start: 2022-11-07 | End: 2022-11-10

## 2022-11-07 RX ORDER — TRIAMCINOLONE ACETONIDE 1 MG/G
OINTMENT TOPICAL 2 TIMES DAILY
Qty: 15 G | Refills: 0 | Status: SHIPPED | OUTPATIENT
Start: 2022-11-07 | End: 2023-06-05

## 2022-11-07 RX ORDER — BENZONATATE 200 MG/1
200 CAPSULE ORAL 3 TIMES DAILY PRN
Qty: 30 CAPSULE | Refills: 0 | Status: SHIPPED | OUTPATIENT
Start: 2022-11-07 | End: 2023-06-05

## 2022-11-07 ASSESSMENT — ACTIVITIES OF DAILY LIVING (ADL)
ADLS_ACUITY_SCORE: 35
ADLS_ACUITY_SCORE: 33

## 2022-11-07 NOTE — PATIENT INSTRUCTIONS
Thank you for choosing us for your care. I think an in-clinic visit would be best next steps based on your symptoms so that you can be reevaluatd. Please schedule a clinic appointment; you won t be charged for this eVisit or urgent care is also reasonable.    You can schedule an appointment right here in [facility], or call [phone]

## 2022-11-08 ASSESSMENT — ENCOUNTER SYMPTOMS
MUSCULOSKELETAL NEGATIVE: 1
HEMATURIA: 0
CARDIOVASCULAR NEGATIVE: 1
CONSTITUTIONAL NEGATIVE: 1
FLANK PAIN: 0
NEUROLOGICAL NEGATIVE: 1
DYSURIA: 0
WHEEZING: 1
FREQUENCY: 0
GASTROINTESTINAL NEGATIVE: 1
COUGH: 1
SHORTNESS OF BREATH: 1

## 2022-11-08 NOTE — DISCHARGE INSTRUCTIONS
Medications as directed.    Finish your doxycycline and prednisone as previously prescribed and start your new prednisone prescription as soon as you finish the 1 that you are currently on.    Continue inhalers and neb solutions and treatments as directed.    Go to the emergency department if symptoms worsen or change this includes chest pain, shortness of breath, heart racing or skipping beats, fevers or productive cough.    Follow-up with primary care doctor for recheck.  No new antibiotics indicated.  This appears to be viral with asthma exacerbation  Chest x-ray today was negative

## 2022-11-09 LAB — BACTERIA UR CULT: NO GROWTH

## 2022-11-09 NOTE — RESULT ENCOUNTER NOTE
"Final urine culture report shows \"NO GROWTH\" and is NEGATIVE.  [facility] Emergency Dept discharge antibiotic: None  Recommendations in treatment per [facility] ED Lab result Urine culture protocol.  "

## 2022-11-09 NOTE — ED PROVIDER NOTES
History     Chief Complaint   Patient presents with     Cough     Vaginal Problem     Patient did evisit last Monday for cough and wheezing. Patient given antibiotics and steroids. Patient reports she is not getting better and is almost out of prednisone. Patient also got a yeast infection from the antibiotics. Denies wanting covid or influenza testing.     HPI  Rose Mary Constantino is a 57 year old female who presents urgent care with cough and wheezing that started a week ago.  She states she was given antibiotics and steroids and states that the cough is not improving.  She she states that she is still noticing some wheezing and chest congestion.  She denies any chest pain, heart racing or skipping beats, fevers, productive cough.  She is a type II diabetic and history of moderate persistent asthma.  She also notes that she is had vaginal irritation and discharge since starting the antibiotic.  She tried over-the-counter Monistat suppository but states it fell out right away and she does not think it did anything.    Allergies:  Allergies   Allergen Reactions     Penicillins Rash       Problem List:    Patient Active Problem List    Diagnosis Date Noted     Type 2 diabetes mellitus without complication, without long-term current use of insulin (H) 06/24/2021     Priority: Medium     Advanced directives, counseling/discussion 04/20/2018     Priority: Medium     Advance Care Planning 4/20/2018: ACP Review of Chart / Resources Provided:  Reviewed chart for advance care plan.  Rose Mary Rose has been provided information and resources to begin or update their advance care plan.  Added by Rachelle Buchanan             Acquired hypothyroidism 10/31/2017     Priority: Medium     HTN (hypertension) 03/31/2014     Priority: Medium     NAFLD (nonalcoholic fatty liver disease) 03/27/2014     Priority: Medium     Moderate persistent asthma 04/25/2012     Priority: Medium     Family history of diabetes mellitus 04/02/2012      Priority: Medium     GERD (gastroesophageal reflux disease) 04/02/2012     Priority: Medium     Zantac not effective       CARDIOVASCULAR SCREENING; LDL GOAL LESS THAN 130 03/19/2012     Priority: Medium     Family history of colon cancer 09/08/2009     Priority: Medium     Enlarged thyroid with 2mm cyst  11/01/2007     Priority: Medium     Sono shows mildly enlarged thryoid with 2mm cyst right lobe. TSH wnl. Sono from 1/2002 showed prominence of thyroid and no cyst.       Obesity 08/21/2007     Priority: Medium     Problem list name updated by automated process. Provider to review          Past Medical History:    Past Medical History:   Diagnosis Date     Abnormal Papanicolaou smear of cervix and cervical HPV 2003     Advanced directives, counseling/discussion 04/20/2018     Eczema      Human papillomavirus in conditions classified elsewhere and of unspecified site      Skin cancer        Past Surgical History:    Past Surgical History:   Procedure Laterality Date     COLONOSCOPY  12/03/2012    Procedure: COLONOSCOPY;  Colonoscopy;  Surgeon: Darnell Siddiqui MD;  Location: [facility]     COLONOSCOPY N/A 06/04/2018    Procedure: COLONOSCOPY;  colonoscopy;  Surgeon: Nakul Antonio MD;  Location: [facility]     ESOPHAGOSCOPY, GASTROSCOPY, DUODENOSCOPY (EGD), COMBINED N/A 05/05/2017    Procedure: COMBINED ESOPHAGOSCOPY, GASTROSCOPY, DUODENOSCOPY (EGD), BIOPSY SINGLE OR MULTIPLE;  Gastroscopy;  Surgeon: Yonny Zambrano MD;  Location: [facility]     NO HISTORY OF SURGERY  08/2007       Family History:    Family History   Problem Relation Age of Onset     Hypertension Mother      Diabetes Mother      Anxiety Disorder Mother      C.A.D. Maternal Grandmother      Hypertension Maternal Grandmother      Cerebrovascular Disease Maternal Grandmother      Cancer Paternal Grandfather         ? type     Diabetes Paternal Grandfather         Adult onset     Cancer Maternal Grandfather         ? renal     Diabetes Father       Hypertension Father      Cancer - colorectal Father      Coronary Artery Disease Father      Anxiety Disorder Father      Respiratory Daughter         asthma     Heart Disease Brother         double bypass     C.A.D. Brother      Cancer Brother         testicular ca     Hypertension Brother      Myocardial Infarction Brother      Breast Cancer No family hx of        Social History:  Marital Status:   [2]  Social History     Tobacco Use     Smoking status: Every Day     Packs/day: 0.25     Years: 25.00     Pack years: 6.25     Types: Cigarettes     Last attempt to quit: 3/14/2016     Years since quittin.6     Smokeless tobacco: Never     Tobacco comments:     5 cigarettes per day   Vaping Use     Vaping Use: Never used   Substance Use Topics     Alcohol use: Not Currently     Comment: 1 beer per month, usually none     Drug use: No        Medications:    benzonatate (TESSALON) 200 MG capsule  fluconazole (DIFLUCAN) 150 MG tablet  nystatin (MYCOSTATIN) 065340 UNIT/GM external ointment  predniSONE (DELTASONE) 10 MG tablet  triamcinolone (KENALOG) 0.1 % external ointment  albuterol (PROAIR HFA/PROVENTIL HFA/VENTOLIN HFA) 108 (90 Base) MCG/ACT inhaler  albuterol (PROVENTIL) (2.5 MG/3ML) 0.083% neb solution  atorvastatin (LIPITOR) 10 MG tablet  clobetasol (TEMOVATE) 0.05 % external ointment  doxycycline hyclate (VIBRA-TABS) 100 MG tablet  fluticasone (FLONASE) 50 MCG/ACT nasal spray  ipratropium - albuterol 0.5 mg/2.5 mg/3 mL (DUONEB) 0.5-2.5 (3) MG/3ML neb solution  levothyroxine (SYNTHROID/LEVOTHROID) 25 MCG tablet  lisinopril (ZESTRIL) 10 MG tablet  loratadine (CLARITIN) 10 MG tablet  metFORMIN (GLUCOPHAGE XR) 500 MG 24 hr tablet  SYMBICORT 160-4.5 MCG/ACT Inhaler          Review of Systems   Constitutional: Negative.    HENT: Negative.    Respiratory: Positive for cough, shortness of breath and wheezing.    Cardiovascular: Negative.    Gastrointestinal: Negative.    Genitourinary: Positive for vaginal  "discharge. Negative for decreased urine volume, dysuria, flank pain, frequency, hematuria, urgency, vaginal bleeding and vaginal pain.        Vaginal irritation   Musculoskeletal: Negative.    Skin: Negative.    Neurological: Negative.    All other systems reviewed and are negative.      Physical Exam   BP: (!) 152/72  Pulse: 81  Temp: 97.4  F (36.3  C)  Resp: 18  Height: 154.9 cm (5' 1\")  Weight: 66.2 kg (146 lb)  SpO2: 96 %      Physical Exam  Vitals and nursing note reviewed.   Constitutional:       General: She is not in acute distress.     Appearance: Normal appearance. She is normal weight. She is not toxic-appearing or diaphoretic.   HENT:      Right Ear: Tympanic membrane and ear canal normal.      Left Ear: Tympanic membrane and ear canal normal.      Nose: Congestion and rhinorrhea present.      Mouth/Throat:      Mouth: Mucous membranes are moist.      Pharynx: Oropharynx is clear. No oropharyngeal exudate or posterior oropharyngeal erythema.   Eyes:      General: No scleral icterus.     Extraocular Movements: Extraocular movements intact.      Conjunctiva/sclera: Conjunctivae normal.      Pupils: Pupils are equal, round, and reactive to light.   Cardiovascular:      Rate and Rhythm: Normal rate and regular rhythm.      Heart sounds: Normal heart sounds.   Pulmonary:      Breath sounds: Wheezing present. No rhonchi.   Chest:      Chest wall: No tenderness.   Abdominal:      General: Bowel sounds are normal.      Palpations: Abdomen is soft.      Tenderness: There is no abdominal tenderness. There is no right CVA tenderness, left CVA tenderness, guarding or rebound.   Genitourinary:     Exam position: Supine.      Pubic Area: No rash or pubic lice.       Labia:         Right: No rash, tenderness, lesion or injury.         Left: No rash, tenderness, lesion or injury.       Comments: Slightly erythematous external vaginal region with slight white discharge noted.  Musculoskeletal:         General: Normal " range of motion.   Skin:     General: Skin is warm.      Capillary Refill: Capillary refill takes less than 2 seconds.   Neurological:      General: No focal deficit present.      Mental Status: She is alert and oriented to person, place, and time.   Psychiatric:         Mood and Affect: Mood normal.         Behavior: Behavior normal.         Thought Content: Thought content normal.         Judgment: Judgment normal.         ED Course                 Procedures             Critical Care time:  none               Results for orders placed or performed during the hospital encounter of 11/07/22   XR Chest 2 Views     Status: None    Narrative    EXAM: XR CHEST 2 VIEWS  LOCATION: [facility]  DATE/TIME: 11/7/2022 5:59 PM    INDICATION: cough, chest congestion x 10 days  COMPARISON: 01/22/2016      Impression    IMPRESSION: Stable calcified granuloma right apex. Otherwise negative chest.   UA reflex to Microscopic and Culture     Status: Abnormal    Specimen: Urine, Clean Catch   Result Value Ref Range    Color Urine Yellow Colorless, Straw, Light Yellow, Yellow    Appearance Urine Clear Clear    Glucose Urine 250 (A) Negative mg/dL    Bilirubin Urine Negative Negative    Ketones Urine Negative Negative mg/dL    Specific Gravity Urine 1.020 1.003 - 1.035    Blood Urine Trace (A) Negative    pH Urine 6.0 5.0 - 7.0    Protein Albumin Urine Negative Negative mg/dL    Urobilinogen Urine Normal Normal, 2.0 mg/dL    Nitrite Urine Negative Negative    Leukocyte Esterase Urine Trace (A) Negative    Bacteria Urine Few (A) None Seen /HPF    Mucus Urine Present (A) None Seen /LPF    RBC Urine 1 <=2 /HPF    WBC Urine 1 <=5 /HPF    Squamous Epithelials Urine 1 <=1 /HPF    Narrative    Urine Culture not indicated   Urine Culture     Status: None (Preliminary result)    Specimen: Urine, Clean Catch   Result Value Ref Range    Culture No growth, less than 1 day    Wet prep     Status: Abnormal    Specimen:  Vagina; Swab   Result Value Ref Range    Trichomonas Absent Absent    Yeast Absent Absent    Clue Cells Absent Absent    WBCs/high power field 1+ (A) None       Medications - No data to display    Assessments & Plan (with Medical Decision Making)     I have reviewed the nursing notes.    I have reviewed the findings, diagnosis, plan and need for follow up with the patient.    Rose Mary Constantino is a 57 year old female who presents urgent care with cough and wheezing that started a week ago.  She states she was given antibiotics and steroids and states that the cough is not improving.  She she states that she is still noticing some wheezing and chest congestion.  She denies any chest pain, heart racing or skipping beats, fevers, productive cough.  She is a type II diabetic and history of moderate persistent asthma.  She also notes that she is had vaginal irritation and discharge since starting the antibiotic.  She tried over-the-counter Monistat suppository but states it fell out right away and she does not think it did anything.    Vitals within normal limits other than elevated blood pressure.  Chest x-ray obtained today and was negative.  UA obtained and shows some contamination.  Urine culture sent and currently pending.  Wet prep obtained and shows 1+ WBC.  Discussed with patient that at this time I would not put her on antibiotics for urinary tract infections and she does not have urinary symptoms.  I suspect that this is all more vaginal related.  We will treat with Diflucan, nystatin and triamcinolone ointment for vaginal irritation.  We will also treat with another round of prednisone and Tessalon Perles to see if this improves patient's symptoms.  Patient to continue albuterol inhaler.  Patient discharged in stable condition and in agreement with plan.  Recommend close follow-up with primary care doctor.    Discharge Medication List as of 11/7/2022  7:04 PM      START taking these medications    Details    benzonatate (TESSALON) 200 MG capsule Take 1 capsule (200 mg) by mouth 3 times daily as needed for cough, Disp-30 capsule, R-0, E-Prescribe      fluconazole (DIFLUCAN) 150 MG tablet Take one tablet now, Disp-1 tablet, R-0, E-Prescribe      nystatin (MYCOSTATIN) 013041 UNIT/GM external ointment Apply topically 2 times dailyDisp-15 g, [id number]      !! predniSONE (DELTASONE) 10 MG tablet Take 4 tablets daily for 2 days,  take 2 tablets daily for 3 days, take 1 tablet daily for 3 days, take half a tablet for 3 days., Disp-19 tablet, R-0, E-Prescribe      triamcinolone (KENALOG) 0.1 % external ointment Apply topically 2 times dailyDisp-15 g, [id number]       !! - Potential duplicate medications found. Please discuss with provider.          Final diagnoses:   Exacerbation of asthma, unspecified asthma severity, unspecified whether persistent   Acute cough   Vaginal irritation - with recent antibiotic use.       11/7/2022   [facility] EMERGENCY DEPT

## 2022-11-14 ENCOUNTER — OFFICE VISIT (OUTPATIENT)
Dept: FAMILY MEDICINE | Facility: CLINIC | Age: 57
End: 2022-11-14
Payer: COMMERCIAL

## 2022-11-14 VITALS
HEIGHT: 61 IN | TEMPERATURE: 97.3 F | HEART RATE: 67 BPM | RESPIRATION RATE: 20 BRPM | WEIGHT: 150.5 LBS | BODY MASS INDEX: 28.42 KG/M2 | DIASTOLIC BLOOD PRESSURE: 70 MMHG | OXYGEN SATURATION: 98 % | SYSTOLIC BLOOD PRESSURE: 122 MMHG

## 2022-11-14 DIAGNOSIS — J45.40 MODERATE PERSISTENT ASTHMA WITHOUT COMPLICATION: ICD-10-CM

## 2022-11-14 DIAGNOSIS — E11.9 TYPE 2 DIABETES MELLITUS WITHOUT COMPLICATION, WITHOUT LONG-TERM CURRENT USE OF INSULIN (H): ICD-10-CM

## 2022-11-14 PROCEDURE — 99214 OFFICE O/P EST MOD 30 MIN: CPT | Performed by: NURSE PRACTITIONER

## 2022-11-14 RX ORDER — ALBUTEROL SULFATE 0.83 MG/ML
SOLUTION RESPIRATORY (INHALATION)
Qty: 1080 ML | Refills: 1 | Status: SHIPPED | OUTPATIENT
Start: 2022-11-14 | End: 2023-06-05

## 2022-11-14 RX ORDER — ALBUTEROL SULFATE 90 UG/1
AEROSOL, METERED RESPIRATORY (INHALATION)
Qty: 25.5 G | Refills: 3 | Status: SHIPPED | OUTPATIENT
Start: 2022-11-14 | End: 2023-01-04

## 2022-11-14 ASSESSMENT — PAIN SCALES - GENERAL: PAINLEVEL: NO PAIN (0)

## 2022-11-14 NOTE — PROGRESS NOTES
Assessment & Plan     Type 2 diabetes mellitus without complication, without long-term current use of insulin (H)  She will return for labs in 1-2 months; was worried her A1c will be high due to elevated blood sugars on the prednisone.  - HEMOGLOBIN A1C; Future  - Albumin Random Urine Quantitative with Creat Ratio; Future    Moderate persistent asthma without complication  Improved.  - albuterol (PROAIR HFA/PROVENTIL HFA/VENTOLIN HFA) 108 (90 Base) MCG/ACT inhaler; USE 2 INHALATIONS ORALLY EVERY SIX HOURS AS NEEDED FOR SHORTNESS OF BREATH / DYSPNEA OR WHEEZING  - albuterol (PROVENTIL) (2.5 MG/3ML) 0.083% neb solution; USE 1 VIAL VIA NEBULIZER EVERY FOUR HOURS AS NEEDED FOR SHORTNESS OF BREATH / DYSPNEA    The risks, benefits and treatment options of prescribed medications or other treatments have been discussed with the patient. The patient verbalized their understanding and should call or follow up if no improvement or if they develop further problems.    REDD Eubanks CNP  [facility]          Betzy Conway is a 57 year old, presenting for the following health issues:  ER F/U, Health Maintenance (Will defer vaccines to another time), and Medication Question (Patient is wondering if the Tessalon comes in a lower dose. She states her lips got tingly after taking one  of the 200mg . She states her OTC cough med helped her cough better. )      HPI     ED/UC Followup:    Facility:  [facility]   Date of visit: 11/7/22  Reason for visit: Asthma, Vaginal Issue   Current Status: Patient states she is feeling better, still some congestion , but getting better, chest is not as tight . Vaginal symptoms are now better.     Diabetes Follow-up    How often are you checking your blood sugar? Two times daily  Blood sugar testing frequency justification:  currently on prednisone  What time of day are you checking your blood sugars (select all that apply)?  Before and after  meals  Have you had any blood sugars above 200?  Yes due to the prednisone  Have you had any blood sugars below 70?  No    What symptoms do you notice when your blood sugar is low?  None    What concerns do you have today about your diabetes? None     Do you have any of these symptoms? (Select all that apply)  No numbness or tingling in feet.  No redness, sores or blisters on feet.  No complaints of excessive thirst.  No reports of blurry vision.  No significant changes to weight.    Have you had a diabetic eye exam in the last 12 months? Yes         BP Readings from Last 2 Encounters:   11/14/22 122/70   11/07/22 (!) 152/72     Hemoglobin A1C (%)   Date Value   06/02/2022 6.7 (H)   12/03/2021 6.5 (H)   06/23/2021 7.0 (H)   06/10/2017 6.0     LDL Cholesterol Calculated (mg/dL)   Date Value   06/02/2022 89   12/03/2021 116 (H)   06/08/2020 126 (H)   06/03/2019 103 (H)                 Asthma Follow-Up    Was ACT completed today?    No - due to recent exacerbation  ACT Total Scores 6/3/2022   ACT TOTAL SCORE -   ASTHMA ER VISITS -   ASTHMA HOSPITALIZATIONS -   ACT TOTAL SCORE (Goal Greater than or Equal to 20) 22   In the past 12 months, how many times did you visit the emergency room for your asthma without being admitted to the hospital? 0   In the past 12 months, how many times were you hospitalized overnight because of your asthma? 0          How many days per week do you miss taking your asthma controller medication?  0    Please describe any recent triggers for your asthma: upper respiratory infections    Have you had any Emergency Room Visits, Urgent Care Visits, or Hospital Admissions since your last office visit?  Yes  Number of ER or Urgent Care visits for asthma: 1          Review of Systems   Constitutional, HEENT, cardiovascular, pulmonary, gi and gu systems are negative, except as otherwise noted.      Objective    /70 (BP Location: Right arm, Patient Position: Chair, Cuff Size: Adult Regular)    "Pulse 67   Temp 97.3  F (36.3  C) (Tympanic)   Resp 20   Ht 1.549 m (5' 1\")   Wt 68.3 kg (150 lb 8 oz)   LMP 04/06/2016   SpO2 98%   Breastfeeding No   BMI 28.44 kg/m    Body mass index is 28.44 kg/m .  Physical Exam   GENERAL: healthy, alert and no distress  HENT: ear canals and TM's normal, nose and mouth without ulcers or lesions  NECK: no adenopathy, no asymmetry, masses, or scars and thyroid normal to palpation  RESP: lungs clear to auscultation - no rales, rhonchi or wheezes  CV: regular rate and rhythm, normal S1 S2, no S3 or S4, no murmur, click or rub, no peripheral edema and peripheral pulses strong  MS: no gross musculoskeletal defects noted, no edema                    "

## 2023-01-01 DIAGNOSIS — J45.40 MODERATE PERSISTENT ASTHMA WITHOUT COMPLICATION: ICD-10-CM

## 2023-01-03 NOTE — TELEPHONE ENCOUNTER
"Requested Prescriptions   Pending Prescriptions Disp Refills     albuterol (PROAIR HFA/PROVENTIL HFA/VENTOLIN HFA) 108 (90 Base) MCG/ACT inhaler [Pharmacy Med Name: ALBUTEROL(PA)HFA INH 8.5GM 90MCG] 25.5 g 3     Sig: USE 2 INHALATIONS EVERY SIX HOURS AS NEEDED FOR SHORTNESS OF BREATH / DYSPNEA OR WHEEZING       Asthma Maintenance Inhalers - Anticholinergics Failed - 1/1/2023  5:39 PM        Failed - Asthma control assessment score within normal limits in last 6 months     Please review ACT score.           Passed - Patient is age 12 years or older        Passed - Medication is active on med list        Passed - Recent (6 mo) or future (30 days) visit within the authorizing provider's specialty     Patient had office visit in the last 6 months or has a visit in the next 30 days with authorizing provider or within the authorizing provider's specialty.  See \"Patient Info\" tab in inbasket, or \"Choose Columns\" in Meds & Orders section of the refill encounter.           Short-Acting Beta Agonist Inhalers Protocol  Failed - 1/1/2023  5:39 PM        Failed - Asthma control assessment score within normal limits in last 6 months     Please review ACT score.           Passed - Patient is age 12 or older        Passed - Medication is active on med list        Passed - Recent (6 mo) or future (30 days) visit within the authorizing provider's specialty     Patient had office visit in the last 6 months or has a visit in the next 30 days with authorizing provider or within the authorizing provider's specialty.  See \"Patient Info\" tab in inbasket, or \"Choose Columns\" in Meds & Orders section of the refill encounter.               "

## 2023-01-04 RX ORDER — ALBUTEROL SULFATE 90 UG/1
AEROSOL, METERED RESPIRATORY (INHALATION)
Qty: 25.5 G | Refills: 3 | Status: SHIPPED | OUTPATIENT
Start: 2023-01-04 | End: 2023-06-05

## 2023-01-13 ENCOUNTER — LAB (OUTPATIENT)
Dept: LAB | Facility: CLINIC | Age: 58
End: 2023-01-13
Payer: COMMERCIAL

## 2023-01-13 DIAGNOSIS — E11.9 TYPE 2 DIABETES MELLITUS WITHOUT COMPLICATION, WITHOUT LONG-TERM CURRENT USE OF INSULIN (H): ICD-10-CM

## 2023-01-13 LAB
CREAT UR-MCNC: 73.2 MG/DL
HBA1C MFR BLD: 7.3 % (ref 0–5.6)
MICROALBUMIN UR-MCNC: <12 MG/L
MICROALBUMIN/CREAT UR: NORMAL MG/G{CREAT}

## 2023-01-13 PROCEDURE — 83036 HEMOGLOBIN GLYCOSYLATED A1C: CPT

## 2023-01-13 PROCEDURE — 36415 COLL VENOUS BLD VENIPUNCTURE: CPT

## 2023-01-13 PROCEDURE — 82043 UR ALBUMIN QUANTITATIVE: CPT

## 2023-01-13 PROCEDURE — 82570 ASSAY OF URINE CREATININE: CPT

## 2023-01-26 ENCOUNTER — TELEPHONE (OUTPATIENT)
Dept: FAMILY MEDICINE | Facility: CLINIC | Age: 58
End: 2023-01-26
Payer: COMMERCIAL

## 2023-01-30 ENCOUNTER — TELEPHONE (OUTPATIENT)
Dept: FAMILY MEDICINE | Facility: CLINIC | Age: 58
End: 2023-01-30
Payer: COMMERCIAL

## 2023-01-30 NOTE — TELEPHONE ENCOUNTER
Per Dr. Dickson's request I have called patient for f/u on HGBA1C.  Patient states she will schedule this appt thru my chart.  Raven ROSEN RN

## 2023-03-02 DIAGNOSIS — J45.40 MODERATE PERSISTENT ASTHMA WITHOUT COMPLICATION: ICD-10-CM

## 2023-03-06 RX ORDER — LORATADINE 10 MG/1
TABLET ORAL
Qty: 90 TABLET | Refills: 1 | Status: SHIPPED | OUTPATIENT
Start: 2023-03-06 | End: 2023-09-05

## 2023-06-01 ENCOUNTER — HEALTH MAINTENANCE LETTER (OUTPATIENT)
Age: 58
End: 2023-06-01

## 2023-06-05 ENCOUNTER — TELEPHONE (OUTPATIENT)
Dept: FAMILY MEDICINE | Facility: CLINIC | Age: 58
End: 2023-06-05

## 2023-06-05 ENCOUNTER — OFFICE VISIT (OUTPATIENT)
Dept: FAMILY MEDICINE | Facility: CLINIC | Age: 58
End: 2023-06-05
Payer: COMMERCIAL

## 2023-06-05 VITALS
RESPIRATION RATE: 16 BRPM | SYSTOLIC BLOOD PRESSURE: 124 MMHG | DIASTOLIC BLOOD PRESSURE: 66 MMHG | HEIGHT: 61 IN | BODY MASS INDEX: 27.87 KG/M2 | OXYGEN SATURATION: 96 % | HEART RATE: 57 BPM | TEMPERATURE: 97.1 F | WEIGHT: 147.6 LBS

## 2023-06-05 DIAGNOSIS — E11.9 TYPE 2 DIABETES MELLITUS WITHOUT COMPLICATION, WITHOUT LONG-TERM CURRENT USE OF INSULIN (H): ICD-10-CM

## 2023-06-05 DIAGNOSIS — J45.40 MODERATE PERSISTENT ASTHMA WITHOUT COMPLICATION: ICD-10-CM

## 2023-06-05 DIAGNOSIS — I10 ESSENTIAL HYPERTENSION WITH GOAL BLOOD PRESSURE LESS THAN 140/90: ICD-10-CM

## 2023-06-05 DIAGNOSIS — M65.30 TRIGGER FINGER, ACQUIRED: ICD-10-CM

## 2023-06-05 DIAGNOSIS — E03.9 ACQUIRED HYPOTHYROIDISM: ICD-10-CM

## 2023-06-05 DIAGNOSIS — Z12.11 SCREEN FOR COLON CANCER: ICD-10-CM

## 2023-06-05 DIAGNOSIS — Z00.00 ROUTINE GENERAL MEDICAL EXAMINATION AT A HEALTH CARE FACILITY: Primary | ICD-10-CM

## 2023-06-05 DIAGNOSIS — Z87.891 PERSONAL HISTORY OF TOBACCO USE: ICD-10-CM

## 2023-06-05 DIAGNOSIS — R09.81 NASAL CONGESTION: ICD-10-CM

## 2023-06-05 DIAGNOSIS — E78.5 HYPERLIPIDEMIA LDL GOAL <70: ICD-10-CM

## 2023-06-05 LAB
ANION GAP SERPL CALCULATED.3IONS-SCNC: 10 MMOL/L (ref 7–15)
BUN SERPL-MCNC: 10.7 MG/DL (ref 8–23)
CALCIUM SERPL-MCNC: 10 MG/DL (ref 8.8–10.2)
CHLORIDE SERPL-SCNC: 106 MMOL/L (ref 98–107)
CHOLEST SERPL-MCNC: 161 MG/DL
CREAT SERPL-MCNC: 0.62 MG/DL (ref 0.51–0.95)
DEPRECATED HCO3 PLAS-SCNC: 25 MMOL/L (ref 22–29)
GFR SERPL CREATININE-BSD FRML MDRD: >90 ML/MIN/1.73M2
GLUCOSE SERPL-MCNC: 130 MG/DL (ref 70–99)
HBA1C MFR BLD: 6.9 % (ref 0–5.6)
HDLC SERPL-MCNC: 66 MG/DL
LDLC SERPL CALC-MCNC: 69 MG/DL
NONHDLC SERPL-MCNC: 95 MG/DL
POTASSIUM SERPL-SCNC: 4.1 MMOL/L (ref 3.4–5.3)
SODIUM SERPL-SCNC: 141 MMOL/L (ref 136–145)
TRIGL SERPL-MCNC: 130 MG/DL
TSH SERPL DL<=0.005 MIU/L-ACNC: 1.87 UIU/ML (ref 0.3–4.2)

## 2023-06-05 PROCEDURE — G0296 VISIT TO DETERM LDCT ELIG: HCPCS | Performed by: NURSE PRACTITIONER

## 2023-06-05 PROCEDURE — 83036 HEMOGLOBIN GLYCOSYLATED A1C: CPT | Performed by: NURSE PRACTITIONER

## 2023-06-05 PROCEDURE — 99396 PREV VISIT EST AGE 40-64: CPT | Performed by: NURSE PRACTITIONER

## 2023-06-05 PROCEDURE — 99213 OFFICE O/P EST LOW 20 MIN: CPT | Mod: 25 | Performed by: NURSE PRACTITIONER

## 2023-06-05 PROCEDURE — 84443 ASSAY THYROID STIM HORMONE: CPT | Performed by: NURSE PRACTITIONER

## 2023-06-05 PROCEDURE — 80061 LIPID PANEL: CPT | Performed by: NURSE PRACTITIONER

## 2023-06-05 PROCEDURE — 36415 COLL VENOUS BLD VENIPUNCTURE: CPT | Performed by: NURSE PRACTITIONER

## 2023-06-05 PROCEDURE — 80048 BASIC METABOLIC PNL TOTAL CA: CPT | Performed by: NURSE PRACTITIONER

## 2023-06-05 RX ORDER — ALBUTEROL SULFATE 0.83 MG/ML
SOLUTION RESPIRATORY (INHALATION)
Qty: 1080 ML | Refills: 11 | Status: SHIPPED | OUTPATIENT
Start: 2023-06-05 | End: 2024-06-07

## 2023-06-05 RX ORDER — ATORVASTATIN CALCIUM 10 MG/1
10 TABLET, FILM COATED ORAL DAILY
Qty: 90 TABLET | Refills: 3 | Status: SHIPPED | OUTPATIENT
Start: 2023-06-05 | End: 2024-06-07

## 2023-06-05 RX ORDER — LISINOPRIL 10 MG/1
10 TABLET ORAL DAILY
Qty: 90 TABLET | Refills: 3 | Status: SHIPPED | OUTPATIENT
Start: 2023-06-05 | End: 2024-06-07

## 2023-06-05 RX ORDER — IPRATROPIUM BROMIDE AND ALBUTEROL SULFATE 2.5; .5 MG/3ML; MG/3ML
1 SOLUTION RESPIRATORY (INHALATION) EVERY 6 HOURS PRN
Qty: 720 ML | Refills: 5 | Status: SHIPPED | OUTPATIENT
Start: 2023-06-05 | End: 2024-06-07

## 2023-06-05 RX ORDER — METFORMIN HCL 500 MG
500 TABLET, EXTENDED RELEASE 24 HR ORAL
Qty: 90 TABLET | Refills: 3 | Status: SHIPPED | OUTPATIENT
Start: 2023-06-05 | End: 2024-01-09

## 2023-06-05 RX ORDER — BUDESONIDE AND FORMOTEROL FUMARATE DIHYDRATE 160; 4.5 UG/1; UG/1
AEROSOL RESPIRATORY (INHALATION)
Qty: 10.2 G | Refills: 3 | Status: SHIPPED | OUTPATIENT
Start: 2023-06-05 | End: 2024-01-02

## 2023-06-05 RX ORDER — FLUTICASONE PROPIONATE 50 MCG
SPRAY, SUSPENSION (ML) NASAL
Qty: 32 G | Refills: 11 | Status: SHIPPED | OUTPATIENT
Start: 2023-06-05 | End: 2024-06-07

## 2023-06-05 RX ORDER — ALBUTEROL SULFATE 90 UG/1
AEROSOL, METERED RESPIRATORY (INHALATION)
Qty: 25.5 G | Refills: 11 | Status: SHIPPED | OUTPATIENT
Start: 2023-06-05 | End: 2024-06-07

## 2023-06-05 RX ORDER — LEVOTHYROXINE SODIUM 25 UG/1
25 TABLET ORAL DAILY
Qty: 90 TABLET | Refills: 3 | Status: SHIPPED | OUTPATIENT
Start: 2023-06-05 | End: 2024-06-07

## 2023-06-05 ASSESSMENT — ENCOUNTER SYMPTOMS
HEMATURIA: 0
MYALGIAS: 0
EYE PAIN: 0
CHILLS: 0
COUGH: 0
PALPITATIONS: 0
FREQUENCY: 0
HEARTBURN: 0
ARTHRALGIAS: 1
WEAKNESS: 0
DIZZINESS: 0
FEVER: 0
ABDOMINAL PAIN: 0
JOINT SWELLING: 0
DYSURIA: 0
NERVOUS/ANXIOUS: 0
SORE THROAT: 0
DIARRHEA: 0
CONSTIPATION: 0
BREAST MASS: 0
HEADACHES: 0
HEMATOCHEZIA: 0
SHORTNESS OF BREATH: 0
PARESTHESIAS: 0
NAUSEA: 0

## 2023-06-05 ASSESSMENT — ASTHMA QUESTIONNAIRES
ACT_TOTALSCORE: 20
EMERGENCY_ROOM_LAST_YEAR_TOTAL: ONE
QUESTION_5 LAST FOUR WEEKS HOW WOULD YOU RATE YOUR ASTHMA CONTROL: WELL CONTROLLED
QUESTION_1 LAST FOUR WEEKS HOW MUCH OF THE TIME DID YOUR ASTHMA KEEP YOU FROM GETTING AS MUCH DONE AT WORK, SCHOOL OR AT HOME: NONE OF THE TIME
QUESTION_3 LAST FOUR WEEKS HOW OFTEN DID YOUR ASTHMA SYMPTOMS (WHEEZING, COUGHING, SHORTNESS OF BREATH, CHEST TIGHTNESS OR PAIN) WAKE YOU UP AT NIGHT OR EARLIER THAN USUAL IN THE MORNING: ONCE OR TWICE
QUESTION_4 LAST FOUR WEEKS HOW OFTEN HAVE YOU USED YOUR RESCUE INHALER OR NEBULIZER MEDICATION (SUCH AS ALBUTEROL): TWO OR THREE TIMES PER WEEK
ACT_TOTALSCORE: 20
QUESTION_2 LAST FOUR WEEKS HOW OFTEN HAVE YOU HAD SHORTNESS OF BREATH: ONCE OR TWICE A WEEK

## 2023-06-05 ASSESSMENT — PAIN SCALES - GENERAL: PAINLEVEL: NO PAIN (1)

## 2023-06-05 NOTE — PROGRESS NOTES
SUBJECTIVE:   CC: Zoraida is an 58 year old who presents for preventive health visit.       6/5/2023     7:09 AM   Additional Questions   Roomed by Vlad TAM CMA   Accompanied by self     Forms 6/5/2023   Any forms needing to be completed Yes     Healthy Habits:     Getting at least 3 servings of Calcium per day:  NO    Bi-annual eye exam:  Yes    Dental care twice a year:  Yes    Sleep apnea or symptoms of sleep apnea:  None    Diet:  Low fat/cholesterol and Diabetic    Frequency of exercise:  2-3 days/week    Duration of exercise:  15-30 minutes    Taking medications regularly:  Yes    Medication side effects:  None    PHQ-2 Total Score: 0    Additional concerns today:  Yes    .  Chief Complaint   Patient presents with     Physical     Physical - wellness for work      Health Maintenance     Due for Colonoscopy - pended, will get vaccines in the Fall per patient      Chronic Disease Management     Diabetes, thyroid, htn, lipids     Arthritis     Joint Pain in Left hand , and Right hand at times, Right Knee          Diabetes Follow-up    How often are you checking your blood sugar? One time daily  What time of day are you checking your blood sugars (select all that apply)?  Before meals  Have you had any blood sugars above 200?  Yes when she was eating incorrectly   Have you had any blood sugars below 70?  No    What symptoms do you notice when your blood sugar is low?  Shaky and hunger pains     What concerns do you have today about your diabetes? None     Do you have any of these symptoms? (Select all that apply)  No numbness or tingling in feet.  No redness, sores or blisters on feet.  No complaints of excessive thirst.  No reports of blurry vision.  No significant changes to weight.    Have you had a diabetic eye exam in the last 12 months? No due             Hyperlipidemia Follow-Up      Are you regularly taking any medication or supplement to lower your cholesterol?   Yes- Lipitor    Are you having muscle  aches or other side effects that you think could be caused by your cholesterol lowering medication?  No    Hypertension Follow-up      Do you check your blood pressure regularly outside of the clinic? Yes     Are you following a low salt diet? Yes    Are your blood pressures ever more than 140 on the top number (systolic) OR more   than 90 on the bottom number (diastolic), for example 140/90? No    BP Readings from Last 2 Encounters:   06/05/23 124/66   11/14/22 122/70     Hemoglobin A1C (%)   Date Value   01/13/2023 7.3 (H)   06/02/2022 6.7 (H)   06/23/2021 7.0 (H)   06/10/2017 6.0     LDL Cholesterol Calculated (mg/dL)   Date Value   06/02/2022 89   12/03/2021 116 (H)   06/08/2020 126 (H)   06/03/2019 103 (H)       Asthma Follow-Up    Was ACT completed today?  Yes        6/5/2023     7:00 AM   ACT Total Scores   ACT TOTAL SCORE (Goal Greater than or Equal to 20) 20   In the past 12 months, how many times did you visit the emergency room for your asthma without being admitted to the hospital? 1   In the past 12 months, how many times were you hospitalized overnight because of your asthma? 0          How many days per week do you miss taking your asthma controller medication?  0    Please describe any recent triggers for your asthma: humidity    Have you had any Emergency Room Visits, Urgent Care Visits, or Hospital Admissions since your last office visit?  Yes  Number of ER or Urgent Care visits for asthma: 1    Hypothyroidism Follow-up      Since last visit, patient describes the following symptoms: loose stools      Joint Pain    Onset: this past Winter - knee, hands 2 months     Left thumb joint sticking - clicks and pops. Sometimes has to manually release it    Description:   Location: Right Knee, Hands- Left worse   Character: Dull ache    Intensity: mild    Progression of Symptoms: better    Accompanying Signs & Symptoms:  Other symptoms: none    History:   Previous similar pain: YES- In Knee        Precipitating factors:   Trauma or overuse: YES- computer with hands, a lot of walking hurt     Alleviating factors:  Improved by: support wrap and ointment for pain           Today's PHQ-2 Score:       6/5/2023     6:59 AM   PHQ-2 ( 1999 [employer])   Q1: Little interest or pleasure in doing things 0   Q2: Feeling down, depressed or hopeless 0   PHQ-2 Score 0   Q1: Little interest or pleasure in doing things Not at all   Q2: Feeling down, depressed or hopeless Not at all   PHQ-2 Score 0       Have you ever done Advance Care Planning? (For example, a Health Directive, POLST, or a discussion with a medical provider or your loved ones about your wishes):has info at home to complete , will give another copy per patient     Social History     Tobacco Use     Smoking status: Every Day     Packs/day: 0.50     Years: 40.00     Pack years: 20.00     Types: Cigarettes     Start date: 1/1/1983     Smokeless tobacco: Never     Tobacco comments:     5 cigarettes per day as of 6/5/2023   Vaping Use     Vaping status: Never Used   Substance Use Topics     Alcohol use: Not Currently     Comment: 1 beer per month, usually none             6/5/2023     6:59 AM   Alcohol Use   Prescreen: >3 drinks/day or >7 drinks/week? No     Reviewed orders with patient.  Reviewed health maintenance and updated orders accordingly - Yes      Breast Cancer Screening:    FHS-7:       6/3/2022     8:52 AM 7/7/2022     4:18 PM 6/5/2023     7:01 AM   Breast CA Risk Assessment (FHS-7)   Did any of your first-degree relatives have breast or ovarian cancer? No No Unknown   Did any of your relatives have bilateral breast cancer? No No Unknown   Did any man in your family have breast cancer? No No Unknown   Did any woman in your family have breast and ovarian cancer? No No Unknown   Did any woman in your family have breast cancer before age 50 y? No No No   Do you have 2 or more relatives with breast and/or ovarian cancer? No No No   Do you have 2 or more  "relatives with breast and/or bowel cancer? Yes No Yes       Mammogram Screening: Recommended annual mammography  Pertinent mammograms are reviewed under the imaging tab.    History of abnormal Pap smear: YES - once in her 30s - all normal since        Latest Ref Rng & Units 6/23/2021     8:03 AM 6/23/2021     8:00 AM 6/5/2018     8:26 AM   PAP / HPV   PAP (Historical)  NIL    NIL     HPV 16 DNA NEG^Negative  Negative      HPV 18 DNA NEG^Negative  Negative      Other HR HPV NEG^Negative  Negative        Reviewed and updated as needed this visit by clinical staff   Tobacco  Allergies  Meds  Problems  Med Hx  Surg Hx  Fam Hx          Reviewed and updated as needed this visit by Provider   Tobacco  Allergies  Meds  Problems  Med Hx  Surg Hx  Fam Hx             Review of Systems   Constitutional: Negative for chills and fever.   HENT: Negative for congestion, ear pain, hearing loss and sore throat.    Eyes: Negative for pain and visual disturbance.   Respiratory: Negative for cough and shortness of breath.    Cardiovascular: Negative for chest pain, palpitations and peripheral edema.   Gastrointestinal: Negative for abdominal pain, constipation, diarrhea, heartburn, hematochezia and nausea.   Breasts:  Negative for tenderness, breast mass and discharge.   Genitourinary: Negative for dysuria, frequency, genital sores, hematuria, pelvic pain, urgency, vaginal bleeding and vaginal discharge.   Musculoskeletal: Positive for arthralgias. Negative for joint swelling and myalgias.   Skin: Negative for rash.   Neurological: Negative for dizziness, weakness, headaches and paresthesias.   Psychiatric/Behavioral: Negative for mood changes. The patient is not nervous/anxious.           OBJECTIVE:   /66 (BP Location: Right arm, Patient Position: Chair, Cuff Size: Adult Regular)   Pulse 57   Temp 97.1  F (36.2  C) (Tympanic)   Resp 16   Ht 1.556 m (5' 1.25\")   Wt 67 kg (147 lb 9.6 oz)   LMP 04/06/2016   SpO2 " 96%   Breastfeeding No   BMI 27.66 kg/m    Physical Exam  GENERAL APPEARANCE: healthy, alert and no distress  EYES: Eyes grossly normal to inspection, PERRL and conjunctivae and sclerae normal  HENT: ear canals and TM's normal, nose and mouth without ulcers or lesions, oropharynx clear and oral mucous membranes moist  NECK: no adenopathy, no asymmetry, masses, or scars and thyroid normal to palpation  RESP: lungs clear to auscultation - no rales, rhonchi or wheezes  BREAST: normal without masses, tenderness or nipple discharge and no palpable axillary masses or adenopathy  CV: regular rate and rhythm, normal S1 S2, no S3 or S4, no murmur, click or rub, no peripheral edema and peripheral pulses strong  ABDOMEN: soft, nontender, no hepatosplenomegaly, no masses and bowel sounds normal  MS: no musculoskeletal defects are noted and gait is age appropriate without ataxia  SKIN: no suspicious lesions or rashes  NEURO: Normal strength and tone, sensory exam grossly normal, mentation intact and speech normal  PSYCH: mentation appears normal and affect normal/bright        ASSESSMENT/PLAN:       ICD-10-CM    1. Routine general medical examination at a health care facility  Z00.00       2. Type 2 diabetes mellitus without complication, without long-term current use of insulin (H)  E11.9 Due for labs today.  Continue metformin.    HEMOGLOBIN A1C     Lipid panel reflex to direct LDL Non-fasting     metFORMIN (GLUCOPHAGE XR) 500 MG 24 hr tablet     Lipid panel reflex to direct LDL Non-fasting     HEMOGLOBIN A1C      3. Acquired hypothyroidism  E03.9 Due for labs today  levothyroxine (SYNTHROID/LEVOTHROID) 25 MCG tablet     TSH     TSH      4. Hyperlipidemia LDL goal <70  E78.5 Due for labs today  Continue atorvastatin (LIPITOR) 10 MG tablet      5. Moderate persistent asthma without complication  J45.40 Well controlled.  ipratropium - albuterol 0.5 mg/2.5 mg/3 mL (DUONEB) 0.5-2.5 (3) MG/3ML neb solution     albuterol (PROAIR  "HFA/PROVENTIL HFA/VENTOLIN HFA) 108 (90 Base) MCG/ACT inhaler     albuterol (PROVENTIL) (2.5 MG/3ML) 0.083% neb solution     budesonide-formoterol (SYMBICORT) 160-4.5 MCG/ACT Inhaler      6. Essential hypertension with goal blood pressure less than 140/90  I10 Well controlled.  BASIC METABOLIC PANEL     lisinopril (ZESTRIL) 10 MG tablet     BASIC METABOLIC PANEL      7. Nasal congestion  R09.81 fluticasone (FLONASE) 50 MCG/ACT nasal spray      8. Trigger finger, acquired  M65.30 Orthopedic  Referral      9. Screen for colon cancer  Z12.11 Colonoscopy Screening  Referral      10. Personal history of tobacco use  Z87.891 Prof fee: Shared Decision Making for Lung Cancer Screening     CT Chest Lung Cancer Scrn Low Dose wo          Patient has been advised of split billing requirements and indicates understanding: Yes      COUNSELING:  Reviewed preventive health counseling, as reflected in patient instructions      BMI:   Estimated body mass index is 27.66 kg/m  as calculated from the following:    Height as of this encounter: 1.556 m (5' 1.25\").    Weight as of this encounter: 67 kg (147 lb 9.6 oz).   Weight management plan: Discussed healthy diet and exercise guidelines      She reports that she has been smoking cigarettes. She started smoking about 40 years ago. She has a 20.00 pack-year smoking history. She has never used smokeless tobacco.  Nicotine/Tobacco Cessation Plan:   Information offered: Patient not interested at this time      The risks, benefits and treatment options of prescribed medications or other treatments have been discussed with the patient. The patient verbalized their understanding and should call or follow up if no improvement or if they develop further problems.    REDD Eubanks CNP  [facility]      Lung Cancer Screening Shared Decision Making Visit     Rose Mary FRANCO Vira, a 58 year old female, is eligible for lung cancer screening    History "   Smoking Status     Every Day     Packs/day: 0.50     Years: 40.00     Types: Cigarettes     Start date: 1/1/1983   Smokeless Tobacco     Never       I have discussed with patient the risks and benefits of screening for lung cancer with low-dose CT.     The risks include:    radiation exposure: one low dose chest CT has as much ionizing radiation as about 15 chest x-rays, or 6 months of background radiation living in [place]      false positives: most findings/nodules are NOT cancer, but some might still require additional diagnostic evaluation, including biopsy    over-diagnosis: some slow growing cancers that might never have been clinically significant will be detected and treated unnecessarily     The benefit of early detection of lung cancer is contingent upon adherence to annual screening or more frequent follow up if indicated.     Furthermore, to benefit from screening, Rose Mary must be willing and able to undergo diagnostic procedures, if indicated. Although no specific guide is available for determining severity of comorbidities, it is reasonable to withhold screening in patients who have greater mortality risk from other diseases.     We did discuss that the best way to prevent lung cancer is to not smoke.    Some patients may value a numeric estimation of lung cancer risk when evaluating if lung cancer screening is right for them, here is one calculator:    ShouldIScreen

## 2023-06-05 NOTE — LETTER
June 7, 2023      Zoraida Constantino  [address] [facility] [address]        Dear ,    We are writing to inform you of your test results.    Your kidney function and electrolytes are normal.   Your A1c is very good at 6.9%.   Thyroid is normal.   Cholesterol is well controlled.     Resulted Orders   TSH   Result Value Ref Range    TSH 1.87 0.30 - 4.20 uIU/mL   Lipid panel reflex to direct LDL Non-fasting   Result Value Ref Range    Cholesterol 161 <200 mg/dL    Triglycerides 130 <150 mg/dL    Direct Measure HDL 66 >=50 mg/dL    LDL Cholesterol Calculated 69 <=100 mg/dL    Non HDL Cholesterol 95 <130 mg/dL    Narrative    Cholesterol  Desirable:  <200 mg/dL    Triglycerides  Normal:  Less than 150 mg/dL  Borderline High:  150-199 mg/dL  High:  200-499 mg/dL  Very High:  Greater than or equal to 500 mg/dL    Direct Measure HDL  Female:  Greater than or equal to 50 mg/dL   Male:  Greater than or equal to 40 mg/dL    LDL Cholesterol  Desirable:  <100mg/dL  Above Desirable:  100-129 mg/dL   Borderline High:  130-159 mg/dL   High:  160-189 mg/dL   Very High:  >= 190 mg/dL    Non HDL Cholesterol  Desirable:  130 mg/dL  Above Desirable:  130-159 mg/dL  Borderline High:  160-189 mg/dL  High:  190-219 mg/dL  Very High:  Greater than or equal to 220 mg/dL   BASIC METABOLIC PANEL   Result Value Ref Range    Sodium 141 136 - 145 mmol/L    Potassium 4.1 3.4 - 5.3 mmol/L    Chloride 106 98 - 107 mmol/L    Carbon Dioxide (CO2) 25 22 - 29 mmol/L    Anion Gap 10 7 - 15 mmol/L    Urea Nitrogen 10.7 8.0 - 23.0 mg/dL    Creatinine 0.62 0.51 - 0.95 mg/dL    Calcium 10.0 8.8 - 10.2 mg/dL    Glucose 130 (H) 70 - 99 mg/dL    GFR Estimate >90 >60 mL/min/1.73m2      Comment:      eGFR calculated using 2021 CKD-EPI equation.   HEMOGLOBIN A1C   Result Value Ref Range    Hemoglobin A1C 6.9 (H) 0.0 - 5.6 %      Comment:      Normal <5.7%   Prediabetes 5.7-6.4%    Diabetes 6.5% or higher     Note: Adopted from ADA consensus  guidelines.       If you have any questions or concerns, please call the clinic at the number listed above.       Sincerely,      REDD Eubanks CNP

## 2023-06-05 NOTE — PATIENT INSTRUCTIONS
Schedule lung cancer screening CT: [phone]    Schedule colonoscopy - someone will call you    Schedule consult with hand surgeon about the trigger finger - someone will call you      Schedule diabetic eye exam - you call [facility]        Lung Cancer Screening   Frequently Asked Questions  If you are at high-risk for lung cancer, getting screened with low-dose computed tomography (LDCT) every year can help save your life. This handout offers answers to some of the most common questions about lung cancer screening. If you have other questions, please call [phone] ([phone]).     What is it?  Lung cancer screening uses special X-ray technology to create an image of your lung tissue. The exam is quick and easy and takes less than 10 seconds. We don t give you any medicine or use any needles. You can eat before and after the exam. You don t need to change your clothes as long as the clothing on your chest doesn t contain metal. But, you do need to be able to hold your breath for at least 6 seconds during the exam.    What is the goal of lung cancer screening?  The goal of lung cancer screening is to save lives. Many times, lung cancer is not found until a person starts having physical symptoms. Lung cancer screening can help detect lung cancer in the earliest stages when it may be easier to treat.    Who should be screened for lung cancer?  We suggest lung cancer screening for anyone who is at high-risk for lung cancer. You are in the high-risk group if you:     are between the ages of 55 and 79, and   have smoked at least 1 pack of cigarettes a day for 20 or more years, and   still smoke or have quit within the past 15 years.    However, if you have a new cough or shortness of breath, you should talk to your doctor before being screened.    Why does it matter if I have symptoms?  Certain symptoms can be a sign that you have a condition in your lungs that should be checked and treated by your  doctor. These symptoms include fever, chest pain, a new or changing cough, shortness of breath that you have never felt before, coughing up blood or unexplained weight loss. Having any of these symptoms can greatly affect the results of lung cancer screening.       Should all smokers get an LDCT lung cancer screening exam?  It depends. Lung cancer screening is for a very specific group of men and women who have a history of heavy smoking over a long period of time (see  Who should be screened for lung cancer  above).  I am in the high-risk group, but have been diagnosed with cancer in the past. Is LDCT lung cancer screening right for me?  In some cases, you should not have LDCT lung screening, such as when your doctor is already following your cancer with CT scan studies. Your doctor will help you decide if LDCT lung screening is right for you.  Do I need to have a screening exam every year?  Yes. If you are in the high-risk group described earlier, you should get an LDCT lung cancer screening exam every year until you are 79, or are no longer willing or able to undergo screening and possible procedures to diagnose and treat lung cancer.  How effective is LDCT at preventing death from lung cancer?  Studies have shown that LDCT lung cancer screening can lower the risk of death from lung cancer by 20 percent in people who are at high-risk.  What are the risks?  There are some risks and limitations of LDCT lung cancer screening. We want to make sure you understand the risks and benefits, so please let us know if you have any questions. Your doctor may want to talk with you more about these risks.   Radiation exposure: As with any exam that uses radiation, there is a very small increased risk of cancer. The amount of radiation in LDCT is small--about the same amount a person would get from a mammogram. Your doctor orders the exam when he or she feels the potential benefits outweigh the risks.   False negatives: No test  is perfect, including LDCT. It is possible that you may have a medical condition, including lung cancer, that is not found during your exam. This is called a false negative result.   False positives and more testing: LDCT very often finds something in the lung that could be cancer, but in fact is not. This is called a false positive result. False positive tests often cause anxiety. To make sure these findings are not cancer, you may need to have more tests. These tests will be done only if you give us permission. Sometimes patients need a treatment that can have side effects, such as a biopsy. For more information on false positives, see  What can I expect from the results?    Findings not related to lung cancer: Your LDCT exam also takes pictures of areas of your body next to your lungs. In a very small number of cases, the CT scan will show an abnormal finding in one of these areas, such as your kidneys, adrenal glands, liver or thyroid. This finding may not be serious, but you may need more tests. Your doctor can help you decide what other tests you may need, if any.  What can I expect from the results?  About 1 out of 4 LDCT exams will find something that may need more tests. Most of the time, these findings are lung nodules. Lung nodules are very small collections of tissue in the lung. These nodules are very common, and the vast majority--more than 97 percent--are not cancer (benign). Most are normal lymph nodes or small areas of scarring from past infections.  But, if a small lung nodule is found to be cancer, the cancer can be cured more than 90 percent of the time. To know if the nodule is cancer, we may need to get more images before your next yearly screening exam. If the nodule has suspicious features (for example, it is large, has an odd shape or grows over time), we will refer you to a specialist for further testing.  Will my doctor also get the results?  Yes. Your doctor will get a copy of your  results.  Is it okay to keep smoking now that there s a cancer screening exam?  No. Tobacco is one of the strongest cancer-causing agents. It causes not only lung cancer, but other cancers and cardiovascular (heart) diseases as well. The damage caused by smoking builds over time. This means that the longer you smoke, the higher your risk of disease. While it is never too late to quit, the sooner you quit, the better.  Where can I find help to quit smoking?  The best way to prevent lung cancer is to stop smoking. If you have already quit smoking, congratulations and keep it up! For help on quitting smoking, please call [employer] at 5-709-QUITNOW (1-665.105.5233) or the American [employer] Society at 1-611.221.4869 to find local resources near you.  One-on-one health coaching:  If you d prefer to work individually with a health care provider on tobacco cessation, we offer:     Medication Therapy Management:  Our specially trained pharmacists work closely with you and your doctor to help you quit smoking.  Call [phone] or [phone] (toll free).

## 2023-06-05 NOTE — TELEPHONE ENCOUNTER
[employer] biometric form completed and signed at appointment. Form was faxed and copy sent to scan and copy placed in cabinet.

## 2023-06-09 ENCOUNTER — TELEPHONE (OUTPATIENT)
Dept: FAMILY MEDICINE | Facility: CLINIC | Age: 58
End: 2023-06-09
Payer: COMMERCIAL

## 2023-06-09 NOTE — TELEPHONE ENCOUNTER
FYI - Status Update    Who is Calling:  CURT FROM [employer] CALLING    Update: Pharmacist would like to discuss the nebulizer solution and the albuterol inhaler. Both ordered the same time. They are concerned about it. Please call to discuss  [phone] ref # is [id number]    Does caller want a call/response back: Yes     Could we please have a response with 24 hours.

## 2023-07-07 ENCOUNTER — OFFICE VISIT (OUTPATIENT)
Dept: ORTHOPEDICS | Facility: CLINIC | Age: 58
End: 2023-07-07
Payer: COMMERCIAL

## 2023-07-07 ENCOUNTER — ANCILLARY PROCEDURE (OUTPATIENT)
Dept: GENERAL RADIOLOGY | Facility: CLINIC | Age: 58
End: 2023-07-07
Attending: PEDIATRICS
Payer: COMMERCIAL

## 2023-07-07 VITALS
DIASTOLIC BLOOD PRESSURE: 77 MMHG | SYSTOLIC BLOOD PRESSURE: 134 MMHG | HEIGHT: 61 IN | BODY MASS INDEX: 28.32 KG/M2 | WEIGHT: 150 LBS

## 2023-07-07 DIAGNOSIS — M65.312 TRIGGER FINGER OF LEFT THUMB: ICD-10-CM

## 2023-07-07 DIAGNOSIS — M65.312 TRIGGER FINGER OF LEFT THUMB: Primary | ICD-10-CM

## 2023-07-07 PROCEDURE — 73140 X-RAY EXAM OF FINGER(S): CPT | Mod: TC | Performed by: RADIOLOGY

## 2023-07-07 PROCEDURE — 99204 OFFICE O/P NEW MOD 45 MIN: CPT | Mod: 25 | Performed by: PEDIATRICS

## 2023-07-07 PROCEDURE — 20550 NJX 1 TENDON SHEATH/LIGAMENT: CPT | Mod: LT | Performed by: PEDIATRICS

## 2023-07-07 RX ORDER — TRIAMCINOLONE ACETONIDE 40 MG/ML
20 INJECTION, SUSPENSION INTRA-ARTICULAR; INTRAMUSCULAR
Status: SHIPPED | OUTPATIENT
Start: 2023-07-07

## 2023-07-07 RX ORDER — LIDOCAINE HYDROCHLORIDE 10 MG/ML
0.5 INJECTION, SOLUTION INFILTRATION; PERINEURAL
Status: SHIPPED | OUTPATIENT
Start: 2023-07-07

## 2023-07-07 RX ADMIN — TRIAMCINOLONE ACETONIDE 20 MG: 40 INJECTION, SUSPENSION INTRA-ARTICULAR; INTRAMUSCULAR at 15:58

## 2023-07-07 RX ADMIN — LIDOCAINE HYDROCHLORIDE 0.5 ML: 10 INJECTION, SOLUTION INFILTRATION; PERINEURAL at 15:58

## 2023-07-07 NOTE — PROGRESS NOTES
ASSESSMENT & PLAN    Rose Mary was seen today for pain.    Diagnoses and all orders for this visit:    Trigger finger of left thumb  -     XR Finger LT G/E 2 vw; Future    Other orders  -     Hand / Upper Extremity Injection/Arthrocentesis: L thumb A1      This issue is chronic and Unchanged.    Discussed the potential causes of trigger finger including direct trauma and repetitive trauma or activity such as gripping/grasping.  Treatment consists of avoiding the offending activity.  Using padded gloves for gripping/grasping activity.  Discussed benefits of splinting finger to prevent triggering and overuse.  Also discussed possible corticosteroid injection, occupational therapy and surgical referral. Home Handout Given.    Plan:  - Today's Plan of Care:  Steroid injection of the left thumb A1 pulley was performed today in clinic  Icing for the next 1-2 days may be helpful for pain. Injection may take 10-14 days to see the full effect.    Trial of oval 8 splint    Discussed activity considerations and other supportive care including Ice/Heat, OTC and other topical medications as needed.    -We also discussed other future treatment options:  Referral to Occupational Hand Therapy  Referral to Hand Surgery    Follow Up: as needed    Concerning signs and symptoms were reviewed and all questions were answered at this time.    Salma Moreno MD [facility]  Sports Medicine Physician  [facility]      -----  Chief Complaint   Patient presents with     Left Thumb - Pain       SUBJECTIVE  Rose Mary Constantino is a/an 58 year old female who is seen as a self referral for evaluation of left thumb pain.     The patient is seen by themselves.  The patient is Right handed    Onset: 4 - 6 week(s) ago. Reports insidious onset without acute precipitating event.  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  CV: distal perfusion intact  Resp: normal respiratory effort without conversational dyspnea   Psych: normal mood and affect  Gait: NORMAL  Neuro: Normal light sensory exam of upper extremity    Left Wrist and Hand exam    Inspection:       No swelling, bruising or deformity bilateral    Tender:       nodular swelling over the A-1 pulley of the 1st digit(s) left  - active triggering    Non Tender:       Remainder of the Wrist and Hand bilateral    ROM:       Full and symmetric active and passive range of motion of the forearm, wrist and digits bilateral    Strength:       5/5 strength in the muscles of the hand, wrist and forearm bilateral    Neurovascular:       2+ radial pulses bilaterally with brisk capillary refill and      normal sensation to light touch in the radial, median and ulnar nerve distributions    RADIOLOGY:  Final results and radiologist's interpretation, available in the [facility] health record.  Images were reviewed with the patient in the office today.  My personal interpretation of the performed imaging:   3 XR views of left thumb reviewed: no acute bony abnormality, mild IP joint degenerative changes  - will follow official read    Review of the result(s) of each unique test - XR         Hand / Upper Extremity Injection/Arthrocentesis: L thumb A1    Date/Time: 7/7/2023 3:58 PM    Performed by: Salma Moreno MD  Authorized by: Salma Moreno MD    Indications:  Therapeutic and pain  Needle Size:  27 G  Guidance: landmark    Approach:  Ulnar  Condition: trigger finger  "   Location:  Thumb    Site:  L thumb A1  Medications:  20 mg triamcinolone 40 MG/ML; 0.5 mL lidocaine 1 %  Outcome:  Tolerated well, no immediate complications  Procedure discussed: discussed risks, benefits, and alternatives    Consent Given by:  Patient  Timeout: timeout called immediately prior to procedure    Prep: patient was prepped and draped in usual sterile fashion

## 2023-07-07 NOTE — LETTER
7/7/2023         RE: Rose Mary Constantino  [id number] [facility] [address]        Dear Colleague,    Thank you for referring your patient, Rose Mary Constantino, to the [facility] [place]. Please see a copy of my visit note below.    ASSESSMENT & PLAN    Rose Mary was seen today for pain.    Diagnoses and all orders for this visit:    Trigger finger of left thumb  -     XR Finger LT G/E 2 vw; Future    Other orders  -     Hand / Upper Extremity Injection/Arthrocentesis: L thumb A1      This issue is chronic and Unchanged.    Discussed the potential causes of trigger finger including direct trauma and repetitive trauma or activity such as gripping/grasping.  Treatment consists of avoiding the offending activity.  Using padded gloves for gripping/grasping activity.  Discussed benefits of splinting finger to prevent triggering and overuse.  Also discussed possible corticosteroid injection, occupational therapy and surgical referral. Home Handout Given.    Plan:  - Today's Plan of Care:  Steroid injection of the left thumb A1 pulley was performed today in clinic  Icing for the next 1-2 days may be helpful for pain. Injection may take 10-14 days to see the full effect.    Trial of oval 8 splint    Discussed activity considerations and other supportive care including Ice/Heat, OTC and other topical medications as needed.    -We also discussed other future treatment options:  Referral to Occupational Hand Therapy  Referral to Hand Surgery    Follow Up: as needed    Concerning signs and symptoms were reviewed and all questions were answered at this time.    Salma Moreno MD [facility]  Sports Medicine Physician  [facility]      -----  Chief Complaint   Patient presents with     Left Thumb - Pain       SUBJECTIVE  Rose Mary Constantino is a/an 58 year old female who is seen as a self referral for evaluation of left thumb pain.     The patient is seen by themselves.  The  "patient is Right handed    Onset: 4 - 6 week(s) ago. Reports insidious onset without acute precipitating event.  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  CV: distal perfusion intact  Resp: normal respiratory effort without conversational dyspnea   Psych: normal mood and affect  Gait: NORMAL  Neuro: Normal light sensory exam of upper extremity    Left Wrist and Hand exam    Inspection:       No swelling, bruising or deformity bilateral    Tender:       nodular swelling over the A-1 pulley of the 1st digit(s) left  - active triggering    Non Tender:       Remainder of the Wrist and Hand bilateral    ROM:       Full and symmetric active and passive range of motion of the forearm, wrist and digits bilateral    Strength:       5/5 strength in the muscles of the hand, wrist and forearm bilateral    Neurovascular:       2+ radial pulses bilaterally with brisk capillary refill and      normal sensation to light touch in the radial, median and ulnar nerve distributions    RADIOLOGY:  Final results and radiologist's interpretation, available in the [facility] health record.  Images were reviewed with the patient in the office today.  My personal interpretation of the performed imaging:   3 XR views of left thumb reviewed: no acute bony abnormality, mild IP joint degenerative changes  - will follow official read    Review of the result(s) of each unique test - XR         Hand / Upper " Extremity Injection/Arthrocentesis: L thumb A1    Date/Time: 7/7/2023 3:58 PM    Performed by: Salma Moreno MD  Authorized by: Salma Moreno MD    Indications:  Therapeutic and pain  Needle Size:  27 G  Guidance: landmark    Approach:  Ulnar  Condition: trigger finger    Location:  Thumb    Site:  L thumb A1  Medications:  20 mg triamcinolone 40 MG/ML; 0.5 mL lidocaine 1 %  Outcome:  Tolerated well, no immediate complications  Procedure discussed: discussed risks, benefits, and alternatives    Consent Given by:  Patient  Timeout: timeout called immediately prior to procedure    Prep: patient was prepped and draped in usual sterile fashion                 Again, thank you for allowing me to participate in the care of your patient.        Sincerely,        Salma Moreno MD

## 2023-07-07 NOTE — PATIENT INSTRUCTIONS
Discussed the potential causes of trigger finger including direct trauma and repetitive trauma or activity such as gripping/grasping.  Treatment consists of avoiding the offending activity.  Using padded gloves for gripping/grasping activity.  Discussed benefits of splinting finger to prevent triggering and overuse.  Also discussed possible corticosteroid injection, occupational therapy and surgical referral. Home Handout Given.    Plan:  - Today's Plan of Care:  Steroid injection of the left thumb A1 pulley was performed today in clinic  Icing for the next 1-2 days may be helpful for pain. Injection may take 10-14 days to see the full effect.    Trial of oval 8 splint    Discussed activity considerations and other supportive care including Ice/Heat, OTC and other topical medications as needed.    -We also discussed other future treatment options:  Referral to Occupational Hand Therapy  Referral to Hand Surgery    Follow Up: as needed    If you have any further questions for your physician or physician s care team you can call [phone] and use option 3 to leave a voice message.     After the Injection     After the injection, strenuous and repetitive activity should be minimized for approximately 48 hours.   Ice should be applied to the injected area at least for the next 48 hours.   Apply ice to the injected area at least 3 - 4 times a day for 20 minutes each time for the next 48 hours. This can reduce the painful  flare  reaction that can follow an injection the next day. This reaction can cause the area that was injected to hurt more the next day just from the injection. This will resolve within a day if it does occur.     Use over-the-counter pain medications such as Tylenol to help with the pain if necessary.     After 48 hours, icing the area may be continued if you find it beneficial.     The lidocaine or marcaine (commonly called Novocain) is an anesthetic agent that is injected with the steroid will  typically relieve your pain for a few hours following the injection. If the  Novocain  and steroid are injected near a nerve, you may experience local numbness or weakness from the nerve block until it wears off. After this wears off your pain may return until the steroid takes effect.   The steroid may be effective immediately after the injection. Do not be concerned if the injection is not effective in relieving your symptoms immediately. In some cases, it may take up to two weeks for the steroid to work.   If you are diabetic, the corticosteroid may cause your blood sugar to become elevated for several days following the injection. This response usually lasts about 2-4 days before it returns to your normal level.   You should report any adverse reaction to you doctor. Call if there are any questions.

## 2023-07-31 ENCOUNTER — E-VISIT (OUTPATIENT)
Dept: FAMILY MEDICINE | Facility: CLINIC | Age: 58
End: 2023-07-31
Payer: COMMERCIAL

## 2023-07-31 DIAGNOSIS — J45.41 MODERATE PERSISTENT ASTHMA WITH EXACERBATION: Primary | ICD-10-CM

## 2023-07-31 PROCEDURE — 99421 OL DIG E/M SVC 5-10 MIN: CPT | Performed by: NURSE PRACTITIONER

## 2023-08-01 RX ORDER — DOXYCYCLINE HYCLATE 100 MG
100 TABLET ORAL 2 TIMES DAILY
Qty: 20 TABLET | Refills: 0 | Status: SHIPPED | OUTPATIENT
Start: 2023-08-01 | End: 2023-08-11

## 2023-08-01 RX ORDER — PREDNISONE 20 MG/1
40 TABLET ORAL DAILY
Qty: 10 TABLET | Refills: 0 | Status: SHIPPED | OUTPATIENT
Start: 2023-08-01 | End: 2023-08-06

## 2023-09-03 DIAGNOSIS — J45.40 MODERATE PERSISTENT ASTHMA WITHOUT COMPLICATION: ICD-10-CM

## 2023-09-05 RX ORDER — LORATADINE 10 MG/1
TABLET ORAL
Qty: 90 TABLET | Refills: 2 | Status: SHIPPED | OUTPATIENT
Start: 2023-09-05 | End: 2024-05-30

## 2023-09-05 NOTE — TELEPHONE ENCOUNTER
"Prescription approved per [facility] Refill Protocol.       Requested Prescriptions   Pending Prescriptions Disp Refills    loratadine (CLARITIN) 10 MG tablet [Pharmacy Med Name: LORATADINE TABS-OTC 10MG] 90 tablet 2     Sig: TAKE 1 TABLET DAILY       Antihistamines Protocol Passed - 9/3/2023 11:45 PM        Passed - Patient is 3-64 years of age     Apply weight-based dosing for peds patients age 3 - 12 years of age.    Forward request to provider for patients under the age of 3 or over the age of 64.          Passed - Recent (12 mo) or future (30 days) visit within the authorizing provider's specialty     Patient has had an office visit with the authorizing provider or a provider within the authorizing providers department within the previous 12 mos or has a future within next 30 days. See \"Patient Info\" tab in inbasket, or \"Choose Columns\" in Meds & Orders section of the refill encounter.              Passed - Medication is active on med list                 Gayla Devlin RN 09/05/23 5:13 PM   "

## 2023-10-08 ENCOUNTER — TELEPHONE (OUTPATIENT)
Dept: FAMILY MEDICINE | Facility: CLINIC | Age: 58
End: 2023-10-08

## 2023-10-08 DIAGNOSIS — J45.40 MODERATE PERSISTENT ASTHMA WITHOUT COMPLICATION: ICD-10-CM

## 2023-10-17 ENCOUNTER — MYC MEDICAL ADVICE (OUTPATIENT)
Dept: FAMILY MEDICINE | Facility: CLINIC | Age: 58
End: 2023-10-17

## 2023-10-17 ENCOUNTER — TELEPHONE (OUTPATIENT)
Dept: FAMILY MEDICINE | Facility: CLINIC | Age: 58
End: 2023-10-17

## 2023-10-17 NOTE — TELEPHONE ENCOUNTER
Pt called requesting COVID testing.     Symptoms: headache, fatigue, cough started Sunday.     Will route to PCP to advise. Please call patient back.    Yulia Stephens RN

## 2023-10-18 NOTE — TELEPHONE ENCOUNTER
Patient sent another [employer] message after this request yesterday.  [facility]  Soledad Bauman CNP

## 2023-11-05 ENCOUNTER — HEALTH MAINTENANCE LETTER (OUTPATIENT)
Age: 58
End: 2023-11-05

## 2023-12-11 ENCOUNTER — TELEPHONE (OUTPATIENT)
Dept: FAMILY MEDICINE | Facility: CLINIC | Age: 58
End: 2023-12-11
Payer: COMMERCIAL

## 2023-12-11 DIAGNOSIS — E11.9 TYPE 2 DIABETES MELLITUS WITHOUT COMPLICATION, WITHOUT LONG-TERM CURRENT USE OF INSULIN (H): Primary | ICD-10-CM

## 2023-12-11 NOTE — TELEPHONE ENCOUNTER
Patient is due for a diabetes follow up appointment with me.      Non-fasting labs due:  A1C    Orders were placed, please have lab work done before visit.      Please ask patient to bring in their glucometer so we can download the data.    Please call patient to schedule.  Soledad Bauman, CNP

## 2023-12-11 NOTE — TELEPHONE ENCOUNTER
Patient Quality Outreach    Patient is due for the following:   Diabetes -  A1C and Diabetic Follow-Up Visit    Next Steps:   Schedule a lab only visit for A1C check prior to office visit for diabetes.    Type of outreach:    Sent [employer] message.    Next Steps:  Reach out within 90 days via Phone.    Max number of attempts reached: Yes. Will try again in 7 days if patient still on fail list.    Questions for provider review:    None           Soledad CELESTE LPN

## 2023-12-18 NOTE — TELEPHONE ENCOUNTER
Patient Quality Outreach    Patient is due for the following:   Diabetes -  A1C and Diabetic Follow-Up Visit    Next Steps:   Schedule a lab only visit for A1C check prior to office visit for diabetes    Type of outreach:    [employer] message was not viewed, message left to return call to the clinic or to view [employer].       Questions for provider review:    None           Emily Page, [facility]

## 2024-01-02 RX ORDER — BUDESONIDE AND FORMOTEROL FUMARATE DIHYDRATE 160; 4.5 UG/1; UG/1
AEROSOL RESPIRATORY (INHALATION)
Qty: 10.2 G | Refills: 3 | Status: SHIPPED | OUTPATIENT
Start: 2024-01-02 | End: 2024-05-20

## 2024-01-02 NOTE — TELEPHONE ENCOUNTER
I resent the prescription.  What was listed as covered, is the generic name for Symbicort.  Please let me know if there is other problems.  Soledad Bauman, CNP

## 2024-01-05 ENCOUNTER — LAB (OUTPATIENT)
Dept: LAB | Facility: CLINIC | Age: 59
End: 2024-01-05
Payer: COMMERCIAL

## 2024-01-05 DIAGNOSIS — E11.9 TYPE 2 DIABETES MELLITUS WITHOUT COMPLICATION, WITHOUT LONG-TERM CURRENT USE OF INSULIN (H): ICD-10-CM

## 2024-01-05 LAB — HBA1C MFR BLD: 7.2 % (ref 0–5.6)

## 2024-01-05 PROCEDURE — 83036 HEMOGLOBIN GLYCOSYLATED A1C: CPT

## 2024-01-05 PROCEDURE — 36415 COLL VENOUS BLD VENIPUNCTURE: CPT

## 2024-01-08 ASSESSMENT — ASTHMA QUESTIONNAIRES
QUESTION_1 LAST FOUR WEEKS HOW MUCH OF THE TIME DID YOUR ASTHMA KEEP YOU FROM GETTING AS MUCH DONE AT WORK, SCHOOL OR AT HOME: NONE OF THE TIME
QUESTION_3 LAST FOUR WEEKS HOW OFTEN DID YOUR ASTHMA SYMPTOMS (WHEEZING, COUGHING, SHORTNESS OF BREATH, CHEST TIGHTNESS OR PAIN) WAKE YOU UP AT NIGHT OR EARLIER THAN USUAL IN THE MORNING: ONCE OR TWICE
ACT_TOTALSCORE: 21
ACT_TOTALSCORE: 21
QUESTION_5 LAST FOUR WEEKS HOW WOULD YOU RATE YOUR ASTHMA CONTROL: WELL CONTROLLED
QUESTION_2 LAST FOUR WEEKS HOW OFTEN HAVE YOU HAD SHORTNESS OF BREATH: ONCE OR TWICE A WEEK
QUESTION_4 LAST FOUR WEEKS HOW OFTEN HAVE YOU USED YOUR RESCUE INHALER OR NEBULIZER MEDICATION (SUCH AS ALBUTEROL): ONCE A WEEK OR LESS

## 2024-01-09 ENCOUNTER — OFFICE VISIT (OUTPATIENT)
Dept: FAMILY MEDICINE | Facility: CLINIC | Age: 59
End: 2024-01-09
Payer: COMMERCIAL

## 2024-01-09 VITALS
DIASTOLIC BLOOD PRESSURE: 82 MMHG | HEIGHT: 61 IN | RESPIRATION RATE: 16 BRPM | WEIGHT: 152 LBS | OXYGEN SATURATION: 97 % | SYSTOLIC BLOOD PRESSURE: 134 MMHG | HEART RATE: 66 BPM | TEMPERATURE: 96.8 F | BODY MASS INDEX: 28.7 KG/M2

## 2024-01-09 DIAGNOSIS — Z12.11 SCREEN FOR COLON CANCER: ICD-10-CM

## 2024-01-09 DIAGNOSIS — E11.9 TYPE 2 DIABETES MELLITUS WITHOUT COMPLICATION, WITHOUT LONG-TERM CURRENT USE OF INSULIN (H): Primary | ICD-10-CM

## 2024-01-09 DIAGNOSIS — Z72.0 TOBACCO ABUSE: ICD-10-CM

## 2024-01-09 DIAGNOSIS — E78.5 HYPERLIPIDEMIA LDL GOAL <100: ICD-10-CM

## 2024-01-09 DIAGNOSIS — I10 BENIGN ESSENTIAL HYPERTENSION: ICD-10-CM

## 2024-01-09 LAB
CREAT UR-MCNC: 31.8 MG/DL
MICROALBUMIN UR-MCNC: <12 MG/L
MICROALBUMIN/CREAT UR: NORMAL MG/G{CREAT}

## 2024-01-09 PROCEDURE — 82570 ASSAY OF URINE CREATININE: CPT | Performed by: NURSE PRACTITIONER

## 2024-01-09 PROCEDURE — 82043 UR ALBUMIN QUANTITATIVE: CPT | Performed by: NURSE PRACTITIONER

## 2024-01-09 PROCEDURE — 99214 OFFICE O/P EST MOD 30 MIN: CPT | Performed by: NURSE PRACTITIONER

## 2024-01-09 RX ORDER — NICOTINE 21 MG/24HR
1 PATCH, TRANSDERMAL 24 HOURS TRANSDERMAL EVERY 24 HOURS
Qty: 30 PATCH | Refills: 0 | Status: SHIPPED | OUTPATIENT
Start: 2024-01-09 | End: 2024-06-07

## 2024-01-09 RX ORDER — METFORMIN HCL 500 MG
500 TABLET, EXTENDED RELEASE 24 HR ORAL 2 TIMES DAILY WITH MEALS
Qty: 180 TABLET | Refills: 3 | Status: SHIPPED | OUTPATIENT
Start: 2024-01-09 | End: 2024-06-07

## 2024-01-09 ASSESSMENT — PAIN SCALES - GENERAL: PAINLEVEL: NO PAIN (0)

## 2024-01-09 NOTE — PATIENT INSTRUCTIONS
Increase metformin to one tablet twice daily.    Diabetes education ordered - someone will call you.      For smoking cessation:  1. Pick a quit date and start the nicotine patch on that date. Apply a new patch every morning. Do not smoke while the patch is on.  2. Start with the 14mg/day patch and use for 4 weeks.  4. Then taper down to the 7 mg/day patch and use for 2-4 weeks.  5. Then discontinue to patch all together.    Phone support: 1-800-QUIT-NOW (1-468.432.3996)  www.smokefree.gov

## 2024-01-17 ENCOUNTER — TRANSFERRED RECORDS (OUTPATIENT)
Dept: HEALTH INFORMATION MANAGEMENT | Facility: CLINIC | Age: 59
End: 2024-01-17
Payer: COMMERCIAL

## 2024-01-17 LAB — RETINOPATHY: NEGATIVE

## 2024-02-26 ENCOUNTER — HOSPITAL ENCOUNTER (EMERGENCY)
Facility: CLINIC | Age: 59
Discharge: HOME | End: 2024-02-26
Attending: FAMILY MEDICINE | Admitting: NURSE PRACTITIONER
Payer: COMMERCIAL

## 2024-02-26 VITALS
HEART RATE: 99 BPM | DIASTOLIC BLOOD PRESSURE: 82 MMHG | TEMPERATURE: 97.7 F | SYSTOLIC BLOOD PRESSURE: 132 MMHG | OXYGEN SATURATION: 96 % | RESPIRATION RATE: 16 BRPM

## 2024-02-26 DIAGNOSIS — A08.4 VIRAL GASTROENTERITIS: ICD-10-CM

## 2024-02-26 LAB
FLUAV RNA SPEC QL NAA+PROBE: NEGATIVE
FLUBV RNA RESP QL NAA+PROBE: NEGATIVE
RSV RNA SPEC NAA+PROBE: NEGATIVE
SARS-COV-2 RNA RESP QL NAA+PROBE: NEGATIVE

## 2024-02-26 PROCEDURE — 250N000011 HC RX IP 250 OP 636: Performed by: NURSE PRACTITIONER

## 2024-02-26 PROCEDURE — 99213 OFFICE O/P EST LOW 20 MIN: CPT | Performed by: NURSE PRACTITIONER

## 2024-02-26 PROCEDURE — G0463 HOSPITAL OUTPT CLINIC VISIT: HCPCS | Performed by: NURSE PRACTITIONER

## 2024-02-26 PROCEDURE — 87637 SARSCOV2&INF A&B&RSV AMP PRB: CPT | Performed by: NURSE PRACTITIONER

## 2024-02-26 RX ORDER — ONDANSETRON 2 MG/ML
4 INJECTION INTRAMUSCULAR; INTRAVENOUS ONCE
Status: DISCONTINUED | OUTPATIENT
Start: 2024-02-26 | End: 2024-02-26

## 2024-02-26 RX ORDER — ONDANSETRON 4 MG/1
4 TABLET, ORALLY DISINTEGRATING ORAL ONCE
Status: COMPLETED | OUTPATIENT
Start: 2024-02-26 | End: 2024-02-26

## 2024-02-26 RX ORDER — ONDANSETRON 4 MG/1
4 TABLET, ORALLY DISINTEGRATING ORAL EVERY 8 HOURS PRN
Qty: 9 TABLET | Refills: 0 | Status: SHIPPED | OUTPATIENT
Start: 2024-02-26 | End: 2024-02-29

## 2024-02-26 RX ORDER — SODIUM CHLORIDE, SODIUM LACTATE, POTASSIUM CHLORIDE, CALCIUM CHLORIDE 600; 310; 30; 20 MG/100ML; MG/100ML; MG/100ML; MG/100ML
1000 INJECTION, SOLUTION INTRAVENOUS CONTINUOUS
Status: DISCONTINUED | OUTPATIENT
Start: 2024-02-26 | End: 2024-02-26

## 2024-02-26 RX ADMIN — ONDANSETRON 4 MG: 4 TABLET, ORALLY DISINTEGRATING ORAL at 18:14

## 2024-02-26 ASSESSMENT — COLUMBIA-SUICIDE SEVERITY RATING SCALE - C-SSRS
2. HAVE YOU ACTUALLY HAD ANY THOUGHTS OF KILLING YOURSELF IN THE PAST MONTH?: NO
6. HAVE YOU EVER DONE ANYTHING, STARTED TO DO ANYTHING, OR PREPARED TO DO ANYTHING TO END YOUR LIFE?: NO
1. IN THE PAST MONTH, HAVE YOU WISHED YOU WERE DEAD OR WISHED YOU COULD GO TO SLEEP AND NOT WAKE UP?: NO

## 2024-02-26 ASSESSMENT — ACTIVITIES OF DAILY LIVING (ADL): ADLS_ACUITY_SCORE: 33

## 2024-02-26 NOTE — ED TRIAGE NOTES
Pt reports increased sleeping, loss of appetite, vomiting, diarrhea as well. Symptom onset 2/24/24    Due to feeling ill pt reports that she has not taken her medications for 2 days.

## 2024-02-26 NOTE — Clinical Note
Rose Mary Constantino was seen and treated in our emergency department on 2/26/2024.  She may return to work on 02/29/2024.       If you have any questions or concerns, please don't hesitate to call.      Lilly Mota, APRN CNP

## 2024-02-27 NOTE — DISCHARGE INSTRUCTIONS
Increase oral fluid intake, nausea medicine has been ordered for you as needed.  Increase oral fluid intake even if you vomit or have diarrhea it is important that you are drinking fluids some of this will stay down if you continually are drinking fluids.    Tested negative today for RSV, COVID, influenza.   If your symptoms are not improving or if symptoms worsen please return.

## 2024-02-27 NOTE — ED PROVIDER NOTES
ED Provider Note  [facility]      History     Chief Complaint   Patient presents with    Nausea, Vomiting, & Diarrhea     HPI  Rose Mary Constantino is a 58 year old female who presents accompanied by her daughter today with nausea, vomiting that ended yesterday, diarrhea on and off today.  Reports that diarrhea is improving.  Tolerating eating ice chips and oral fluid intake.  Reports still having mild nausea.  Would like to be tested for influenza, RSV, COVID.  Reports that she is unsure if she has had exposure to influenza, RSV or COVID.  Reports that one of her coworkers brought in her grandson that was sick with a gastrointestinal viral illness she thinks that she may have caught what she has from him.  Denies any fever.             Allergies:  Allergies   Allergen Reactions    Penicillins Rash       Problem List:    Patient Active Problem List    Diagnosis Date Noted    Type 2 diabetes mellitus without complication, without long-term current use of insulin (H) 06/24/2021     Priority: Medium    Advanced directives, counseling/discussion 04/20/2018     Priority: Medium     Advance Care Planning 4/20/2018: ACP Review of Chart / Resources Provided:  Reviewed chart for advance care plan.  Rose Mary Rose has been provided information and resources to begin or update their advance care plan.  Added by Rachelle Buchanan            Acquired hypothyroidism 10/31/2017     Priority: Medium    HTN (hypertension) 03/31/2014     Priority: Medium    NAFLD (nonalcoholic fatty liver disease) 03/27/2014     Priority: Medium    Moderate persistent asthma 04/25/2012     Priority: Medium    Family history of diabetes mellitus 04/02/2012     Priority: Medium    GERD (gastroesophageal reflux disease) 04/02/2012     Priority: Medium     Zantac not effective      CARDIOVASCULAR SCREENING; LDL GOAL LESS THAN 130 03/19/2012     Priority: Medium    Family history of colon cancer 09/08/2009     Priority: Medium     Enlarged thyroid with 2mm cyst  11/01/2007     Priority: Medium     Sono shows mildly enlarged thryoid with 2mm cyst right lobe. TSH wnl. Sono from 1/2002 showed prominence of thyroid and no cyst.      Obesity 08/21/2007     Priority: Medium     Problem list name updated by automated process. Provider to review          Past Medical History:    Past Medical History:   Diagnosis Date    Abnormal Papanicolaou smear of cervix and cervical HPV 2003    Advanced directives, counseling/discussion 04/20/2018    Eczema     Human papillomavirus in conditions classified elsewhere and of unspecified site     Skin cancer        Past Surgical History:    Past Surgical History:   Procedure Laterality Date    COLONOSCOPY  12/03/2012    Procedure: COLONOSCOPY;  Colonoscopy;  Surgeon: Darnell Siddiqui MD;  Location: [facility]    COLONOSCOPY N/A 06/04/2018    Procedure: COLONOSCOPY;  colonoscopy;  Surgeon: Nakul Antonio MD;  Location: [facility]    ESOPHAGOSCOPY, GASTROSCOPY, DUODENOSCOPY (EGD), COMBINED N/A 05/05/2017    Procedure: COMBINED ESOPHAGOSCOPY, GASTROSCOPY, DUODENOSCOPY (EGD), BIOPSY SINGLE OR MULTIPLE;  Gastroscopy;  Surgeon: Yonny Zambrano MD;  Location: [facility]    NO HISTORY OF SURGERY  08/2007       Family History:    Family History   Problem Relation Age of Onset    Hypertension Mother     Diabetes Mother     Anxiety Disorder Mother     C.A.D. Maternal Grandmother     Hypertension Maternal Grandmother     Cerebrovascular Disease Maternal Grandmother     Cancer Paternal Grandfather         ? type    Diabetes Paternal Grandfather         Adult onset    Cancer Maternal Grandfather         ? renal    Diabetes Father     Hypertension Father     Cancer - colorectal Father     Coronary Artery Disease Father     Anxiety Disorder Father     Respiratory Daughter         asthma    Heart Disease Brother         double bypass    C.A.D. Brother     Cancer Brother         testicular ca    Hypertension Brother     Myocardial  Infarction Brother     Breast Cancer No family hx of        Social History:  Marital Status:   [2]  Social History     Tobacco Use    Smoking status: Every Day     Packs/day: 0.50     Years: 40.00     Additional pack years: 0.00     Total pack years: 20.00     Types: Cigarettes     Start date: 1/1/1983    Smokeless tobacco: Never    Tobacco comments:     5 cigarettes per day as of 6/5/2023   Vaping Use    Vaping Use: Never used   Substance Use Topics    Alcohol use: Not Currently     Comment: 1 beer per month, usually none    Drug use: No        Medications:    ondansetron (ZOFRAN ODT) 4 MG ODT tab  albuterol (PROAIR HFA/PROVENTIL HFA/VENTOLIN HFA) 108 (90 Base) MCG/ACT inhaler  albuterol (PROVENTIL) (2.5 MG/3ML) 0.083% neb solution  atorvastatin (LIPITOR) 10 MG tablet  budesonide-formoterol (SYMBICORT) 160-4.5 MCG/ACT Inhaler  clobetasol (TEMOVATE) 0.05 % external ointment  fluticasone (FLONASE) 50 MCG/ACT nasal spray  ipratropium - albuterol 0.5 mg/2.5 mg/3 mL (DUONEB) 0.5-2.5 (3) MG/3ML neb solution  levothyroxine (SYNTHROID/LEVOTHROID) 25 MCG tablet  lisinopril (ZESTRIL) 10 MG tablet  loratadine (CLARITIN) 10 MG tablet  metFORMIN (GLUCOPHAGE XR) 500 MG 24 hr tablet  nicotine (NICODERM CQ) 14 MG/24HR 24 hr patch          Review of Systems  A medically appropriate review of systems was performed with pertinent positives and negatives noted in the HPI, and all other systems negative.    Physical Exam   Patient Vitals for the past 24 hrs:   BP Temp Temp src Pulse Resp SpO2   02/26/24 1701 132/82 97.7  F (36.5  C) Tympanic 99 16 96 %          Physical Exam  General: alert and in no acute distress on arrival  Ears/Nose/Throat: ENT: Left Ear: TM intact, middle ear is not erythremic, no purulence, canal patent. Right Ear: TM intact, middle ear is not erythremic, no purulence, canal patent. Nose: No erythema or edema patent nostrils bilateral. Throat: midline uvula, non-erythremic, non-enlarged tonsils, without  exudate.  No cervical adenopathy.   Head: atraumatic, normocephalic  Lungs:  nonlabored, CTA bilateral throughout.  CV: Regular rate and rhythm, extremities warm and perfused, no edema in lower extremities.  Abd: nondistended, nontender, mildly hyper bowel sounds in right lower quadrant.  Skin: no rashes, no diaphoresis and skin color normal  Neuro: Patient awake, alert, speech is fluent,   Psychiatric: affect/mood normal, pleasant.      ED Course                 Procedures                  Results for orders placed or performed during the hospital encounter of 02/26/24 (from the past 24 hour(s))   Symptomatic Influenza A/B, RSV, & SARS-CoV2 PCR (COVID-19) Nasopharyngeal    Specimen: Nasopharyngeal; Swab   Result Value Ref Range    Influenza A PCR Negative Negative    Influenza B PCR Negative Negative    RSV PCR Negative Negative    SARS CoV2 PCR Negative Negative    Narrative    Testing was performed using the Xpert Xpress CoV2/Flu/RSV Assay on the [employer] GeneXpert Instrument. This test should be ordered for the detection of SARS-CoV-2, influenza, and RSV viruses in individuals who meet clinical and/or epidemiological criteria. Test performance is unknown in asymptomatic patients. This test is for in vitro diagnostic use under the FDA EUA for laboratories certified under CLIA to perform high or moderate complexity testing. This test has not been FDA cleared or approved. A negative result does not rule out the presence of PCR inhibitors in the specimen or target RNA in concentration below the limit of detection for the assay. If only one viral target is positive but coinfection with multiple targets is suspected, the sample should be re-tested with another FDA cleared, approved, or authorized test, if coinfection would change clinical management. This test was validated by the [facility]. These laboratories are certified under the Clinical Laboratory Improvement Amendments of 1988 (CLIA-88) as  qualified to perform high complexity laboratory testing.       MEDICATIONS GIVEN IN THE EMERGENCY DEPARTMENT:  Medications   ondansetron (ZOFRAN ODT) ODT tab 4 mg (has no administration in time range)                Assessments & Plan (with Medical Decision Making)  58 year old female who presents to the Urgent Care for evaluation of viral gastroenteritis, patient was given Zofran in urgent care, she was tolerating oral fluid intake before leaving.  Advised to increase oral fluid intake, manage fevers or pain with ibuprofen or Tylenol.  She tested negative today for RSV, COVID, influenza A and influenza B.   Advised to return if symptoms worsen despite recommended treatment plan.       I have reviewed the nursing notes.    I have reviewed the findings, diagnosis, plan and need for follow up with the patient.        NEW PRESCRIPTIONS STARTED AT TODAY'S ER VISIT  New Prescriptions    ONDANSETRON (ZOFRAN ODT) 4 MG ODT TAB    Take 1 tablet (4 mg) by mouth every 8 hours as needed for nausea       Final diagnoses:   Viral gastroenteritis       2/26/2024   [facility] EMERGENCY DEPT       Lilly Mota, APRN CNP  02/26/24 [id number]

## 2024-03-01 ENCOUNTER — OFFICE VISIT (OUTPATIENT)
Dept: ORTHOPEDICS | Facility: CLINIC | Age: 59
End: 2024-03-01
Payer: COMMERCIAL

## 2024-03-01 VITALS — HEIGHT: 61 IN | BODY MASS INDEX: 28.7 KG/M2 | WEIGHT: 152 LBS

## 2024-03-01 DIAGNOSIS — M65.312 TRIGGER FINGER OF LEFT THUMB: Primary | ICD-10-CM

## 2024-03-01 PROCEDURE — 20550 NJX 1 TENDON SHEATH/LIGAMENT: CPT | Mod: FA | Performed by: PEDIATRICS

## 2024-03-01 RX ADMIN — LIDOCAINE HYDROCHLORIDE 0.5 ML: 10 INJECTION, SOLUTION INFILTRATION; PERINEURAL at 16:21

## 2024-03-01 RX ADMIN — TRIAMCINOLONE ACETONIDE 40 MG: 40 INJECTION, SUSPENSION INTRA-ARTICULAR; INTRAMUSCULAR at 16:21

## 2024-03-01 NOTE — LETTER
3/1/2024         RE: Rose Mary Constantino  [address] [facility] [address]        Dear Colleague,    Thank you for referring your patient, Rose Mary Constantino, to the [facility] [place]. Please see a copy of my visit note below.    ASSESSMENT & PLAN    Diagnoses and all orders for this visit:    Trigger finger of left thumb      This issue is chronic and Worsening.      ICD-10-CM    1. Trigger finger of left thumb  M65.312         Discussed the potential causes of trigger finger including direct trauma and repetitive trauma or activity such as gripping/grasping.  Treatment consists of avoiding the offending activity.  Using padded gloves for gripping/grasping activity.  Discussed benefits of splinting finger to prevent triggering and overuse.  Also discussed possible corticosteroid injection, occupational therapy and surgical referral.  - Reasonable to repeat steroid injection. Discussed would limit injections to 2, next step if symptoms return would likely be OT or surgical referral.    Plan:  - Today's Plan of Care:  Steroid injection of the left hand left thumb A1 pulley was performed today in clinic  Icing for the next 1-2 days may be helpful for pain. Injection may take 10-14 days to see the full effect.    Discussed activity considerations and other supportive care including Ice/Heat, OTC and other topical medications as needed.    Brace overnight for 2 weeks    -We also discussed other future treatment options:  Referral to Hand Surgery    Follow Up: as needed    Concerning signs and symptoms were reviewed and all questions were answered at this time.    Salma Moreno MD [facility]  Sports Medicine Physician  [facility]      SUBJECTIVE- Interim History March 1, 2024    No chief complaint on file.      Rose Mary Constantino is a 58 year old female who is seen in f/u up for Trigger finger of left thumb. Since last visit on 7/7/23, patient has left thumb pain  again with triggering.  Onset: 4 - 6 week(s) ago. Reports insidious onset without acute precipitating event.    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  CV: distal perfusion intact  Resp: normal respiratory effort without conversational dyspnea   Psych: normal mood and affect  Gait: NORMAL  Neuro: Normal light sensory exam of upper extremity     Left Wrist and Hand exam     Inspection:       No swelling, bruising or deformity bilateral     Tender:       nodular swelling over the A-1 pulley of the 1st digit(s) left  - active triggering     Non Tender:       Remainder of the Wrist and Hand bilateral     ROM:       Full and symmetric active and passive range of motion of the forearm, wrist and digits bilateral     Strength:       5/5 strength in the muscles of the hand, wrist and forearm bilateral     Neurovascular:       2+ radial pulses bilaterally with brisk capillary refill and      normal sensation to light touch in the radial, median and ulnar nerve distributions    RADIOLOGY:  Final results and radiologist's interpretation, available in the [facility] health record.  Images were reviewed with the patient in the office today.  My personal interpretation of the performed imaging:    3 XR views of left thumb reviewed 7/7/2023: no acute bony abnormality, mild IP joint degenerative changes     Results for orders placed or performed in  visit on 07/07/23   XR Finger LT G/E 2 vw    Narrative    XR FINGER LEFT G/E 2 VIEWS 7/7/2023 3:19 PM     HISTORY: Trigger finger of left thumb    COMPARISON: None.       Impression    IMPRESSION: The left thumb is negative for fracture or dislocation.  There is degenerative change at the first CMC joint, first MCP joint,  and IP joint of the thumb.    DON CAREY MD         SYSTEM ID:  [id number]       Review of the result(s) of each unique test - XR       Hand / Upper Extremity Injection/Arthrocentesis: L thumb A1    Date/Time: 3/1/2024 4:21 PM    Performed by: Salma Moreno MD  Authorized by: Salma Moreno MD    Indications:  Pain  Needle Size:  27 G  Guidance: landmark    Approach:  Volar  Condition: trigger finger    Location:  Thumb    Site:  L thumb A1  Medications:  0.5 mL lidocaine 1 %; 40 mg triamcinolone 40 MG/ML  Medications comment:  .25 ml of Kenalog 40mg/ ml  .25ml of Lidocaine 1%   Outcome:  Tolerated well, no immediate complications  Procedure discussed: discussed risks, benefits, and alternatives    Consent Given by:  Patient  Timeout: timeout called immediately prior to procedure    Prep: patient was prepped and draped in usual sterile fashion     The risks, benefits and complications of steroid injection were discussed with the patient (including but not limited to: bleeding, infection, pain, scar, damage to adjacent structures, atrophy or necrosis of soft tissue, skin blanching, failure to relieve symptoms, worsening of symptoms, allergic reaction). After this discussion all questions were addressed and answered and the patient elected to proceed. The patient tolerated the procedure well without complications.  Also discussed that if diabetic, recommend close monitoring of blood sugars over the next week as cortisone injections can temporarily elevate blood sugars.        Again, thank you for allowing me to participate in the care of your patient.        Sincerely,        Salma Morneo  MD

## 2024-03-01 NOTE — PATIENT INSTRUCTIONS
Discussed the potential causes of trigger finger including direct trauma and repetitive trauma or activity such as gripping/grasping.  Treatment consists of avoiding the offending activity.  Using padded gloves for gripping/grasping activity.  Discussed benefits of splinting finger to prevent triggering and overuse.  Also discussed possible corticosteroid injection, occupational therapy and surgical referral.    Plan:  - Today's Plan of Care:  Steroid injection of the left hand left thumb A1 pulley was performed today in clinic  Icing for the next 1-2 days may be helpful for pain. Injection may take 10-14 days to see the full effect.    Discussed activity considerations and other supportive care including Ice/Heat, OTC and other topical medications as needed.    Brace overnight for 2 weeks    -We also discussed other future treatment options:  Referral to Hand Surgery    Follow Up: as needed    If you have any further questions for your physician or physician s care team you can call [phone] and use option 3 to leave a voice message.     After the Injection     After the injection, strenuous and repetitive activity should be minimized for approximately 48 hours.   Ice should be applied to the injected area at least for the next 48 hours.   Apply ice to the injected area at least 3 - 4 times a day for 20 minutes each time for the next 48 hours. This can reduce the painful  flare  reaction that can follow an injection the next day. This reaction can cause the area that was injected to hurt more the next day just from the injection. This will resolve within a day if it does occur.     Use over-the-counter pain medications such as Tylenol to help with the pain if necessary.     After 48 hours, icing the area may be continued if you find it beneficial.     The lidocaine or marcaine (commonly called Novocain) is an anesthetic agent that is injected with the steroid will typically relieve your pain for a few hours  following the injection. If the  Novocain  and steroid are injected near a nerve, you may experience local numbness or weakness from the nerve block until it wears off. After this wears off your pain may return until the steroid takes effect.   The steroid may be effective immediately after the injection. Do not be concerned if the injection is not effective in relieving your symptoms immediately. In some cases, it may take up to two weeks for the steroid to work.   If you are diabetic, the corticosteroid may cause your blood sugar to become elevated for several days following the injection. This response usually lasts about 2-4 days before it returns to your normal level.   You should report any adverse reaction to you doctor. Call if there are any questions.

## 2024-03-01 NOTE — PROGRESS NOTES
ASSESSMENT & PLAN    Diagnoses and all orders for this visit:    Trigger finger of left thumb      This issue is chronic and Worsening.      ICD-10-CM    1. Trigger finger of left thumb  M65.312         Discussed the potential causes of trigger finger including direct trauma and repetitive trauma or activity such as gripping/grasping.  Treatment consists of avoiding the offending activity.  Using padded gloves for gripping/grasping activity.  Discussed benefits of splinting finger to prevent triggering and overuse.  Also discussed possible corticosteroid injection, occupational therapy and surgical referral.  - Reasonable to repeat steroid injection. Discussed would limit injections to 2, next step if symptoms return would likely be OT or surgical referral.    Plan:  - Today's Plan of Care:  Steroid injection of the left hand left thumb A1 pulley was performed today in clinic  Icing for the next 1-2 days may be helpful for pain. Injection may take 10-14 days to see the full effect.    Discussed activity considerations and other supportive care including Ice/Heat, OTC and other topical medications as needed.    Brace overnight for 2 weeks    -We also discussed other future treatment options:  Referral to Hand Surgery    Follow Up: as needed    Concerning signs and symptoms were reviewed and all questions were answered at this time.    Salma Moreno MD [facility]  Sports Medicine Physician  [facility]      SUBJECTIVE- Interim History March 1, 2024    No chief complaint on file.      Rose Mary Constantino is a 58 year old female who is seen in f/u up for Trigger finger of left thumb. Since last visit on 7/7/23, patient has left thumb pain again with triggering.  Onset: 4 - 6 week(s) ago. Reports insidious onset without acute precipitating event.    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  CV: distal perfusion intact  Resp: normal respiratory effort without conversational dyspnea   Psych: normal mood and affect  Gait: NORMAL  Neuro: Normal light sensory exam of upper extremity     Left Wrist and Hand exam     Inspection:       No swelling, bruising or deformity bilateral     Tender:       nodular swelling over the A-1 pulley of the 1st digit(s) left  - active triggering     Non Tender:       Remainder of the Wrist and Hand bilateral     ROM:       Full and symmetric active and passive range of motion of the forearm, wrist and digits bilateral     Strength:       5/5 strength in the muscles of the hand, wrist and forearm bilateral     Neurovascular:       2+ radial pulses bilaterally with brisk capillary refill and      normal sensation to light touch in the radial, median and ulnar nerve distributions    RADIOLOGY:  Final results and radiologist's interpretation, available in the [facility] health record.  Images were reviewed with the patient in the office today.  My personal interpretation of the performed imaging:    3 XR views of left thumb reviewed 7/7/2023: no acute bony abnormality, mild IP joint degenerative changes     Results for orders placed or performed in visit on 07/07/23   XR Finger LT G/E 2 vw    Narrative    XR FINGER LEFT G/E 2 VIEWS 7/7/2023 3:19 PM     HISTORY: Trigger finger of left thumb    COMPARISON: None.       Impression    IMPRESSION: The left thumb is negative for fracture or dislocation.  There  is degenerative change at the first CMC joint, first MCP joint,  and IP joint of the thumb.    DON CAREY MD         SYSTEM ID:  [id number]       Review of the result(s) of each unique test - XR       Hand / Upper Extremity Injection/Arthrocentesis: L thumb A1    Date/Time: 3/1/2024 4:21 PM    Performed by: Salma Moreno MD  Authorized by: Salma Moerno MD    Indications:  Pain  Needle Size:  27 G  Guidance: landmark    Approach:  Volar  Condition: trigger finger    Location:  Thumb    Site:  L thumb A1  Medications:  0.5 mL lidocaine 1 %; 40 mg triamcinolone 40 MG/ML  Medications comment:  .25 ml of Kenalog 40mg/ ml  .25ml of Lidocaine 1%   Outcome:  Tolerated well, no immediate complications  Procedure discussed: discussed risks, benefits, and alternatives    Consent Given by:  Patient  Timeout: timeout called immediately prior to procedure    Prep: patient was prepped and draped in usual sterile fashion     The risks, benefits and complications of steroid injection were discussed with the patient (including but not limited to: bleeding, infection, pain, scar, damage to adjacent structures, atrophy or necrosis of soft tissue, skin blanching, failure to relieve symptoms, worsening of symptoms, allergic reaction). After this discussion all questions were addressed and answered and the patient elected to proceed. The patient tolerated the procedure well without complications.  Also discussed that if diabetic, recommend close monitoring of blood sugars over the next week as cortisone injections can temporarily elevate blood sugars.

## 2024-03-02 RX ORDER — TRIAMCINOLONE ACETONIDE 40 MG/ML
40 INJECTION, SUSPENSION INTRA-ARTICULAR; INTRAMUSCULAR
Status: SHIPPED | OUTPATIENT
Start: 2024-03-01

## 2024-03-02 RX ORDER — LIDOCAINE HYDROCHLORIDE 10 MG/ML
0.5 INJECTION, SOLUTION INFILTRATION; PERINEURAL
Status: SHIPPED | OUTPATIENT
Start: 2024-03-01

## 2024-03-25 ENCOUNTER — HOSPITAL ENCOUNTER (EMERGENCY)
Facility: CLINIC | Age: 59
Discharge: HOME | End: 2024-03-25
Payer: COMMERCIAL

## 2024-03-25 VITALS
OXYGEN SATURATION: 97 % | RESPIRATION RATE: 20 BRPM | TEMPERATURE: 98.6 F | DIASTOLIC BLOOD PRESSURE: 60 MMHG | HEART RATE: 95 BPM | SYSTOLIC BLOOD PRESSURE: 123 MMHG

## 2024-03-25 DIAGNOSIS — B97.89 VIRAL RESPIRATORY ILLNESS: ICD-10-CM

## 2024-03-25 DIAGNOSIS — J98.8 VIRAL RESPIRATORY ILLNESS: ICD-10-CM

## 2024-03-25 LAB
FLUAV RNA SPEC QL NAA+PROBE: POSITIVE
FLUBV RNA RESP QL NAA+PROBE: NEGATIVE
RSV RNA SPEC NAA+PROBE: NEGATIVE
SARS-COV-2 RNA RESP QL NAA+PROBE: NEGATIVE

## 2024-03-25 PROCEDURE — 99213 OFFICE O/P EST LOW 20 MIN: CPT

## 2024-03-25 PROCEDURE — 87637 SARSCOV2&INF A&B&RSV AMP PRB: CPT

## 2024-03-25 PROCEDURE — G0463 HOSPITAL OUTPT CLINIC VISIT: HCPCS

## 2024-03-25 RX ORDER — PREDNISONE 20 MG/1
TABLET ORAL
Qty: 10 TABLET | Refills: 0 | Status: SHIPPED | OUTPATIENT
Start: 2024-03-25

## 2024-03-25 ASSESSMENT — ENCOUNTER SYMPTOMS
FEVER: 1
DIAPHORESIS: 0
FACIAL SWELLING: 0
COUGH: 1
WHEEZING: 1
MYALGIAS: 1
CHEST TIGHTNESS: 0
NAUSEA: 0
SORE THROAT: 0
CHILLS: 0
SHORTNESS OF BREATH: 0
RHINORRHEA: 1

## 2024-03-25 ASSESSMENT — ACTIVITIES OF DAILY LIVING (ADL): ADLS_ACUITY_SCORE: 35

## 2024-03-25 NOTE — ED TRIAGE NOTES
Pt reports dry cough and has concerns for bronchitis. Symptom onset 3/22/24  States her nebulizer treatments are not helping anymore.

## 2024-03-25 NOTE — Clinical Note
Rose Mary Constantino was seen and treated in our emergency department on 3/25/2024.  She may return to work on 03/27/2024.       If you have any questions or concerns, please don't hesitate to call.      Doris Shelley PA-C

## 2024-03-25 NOTE — ED PROVIDER NOTES
History     Chief Complaint   Patient presents with    Cough     HPI  Rose Mary Constantino is a 59 year old female who presents with concerns for a 3-day history of cough, viral congestion, and bodyaches. Denies any known sick contacts. Cough was initially productive with yellow sputum however no longer productive. Reports that she had a fever of 99  F this morning, and has been taking Tylenol. She does have a history of moderate persistent asthma and has been using her nebulizer, alternating between albuterol and DuoNeb. This has helped her symptoms slightly, however cough is still present. Denies any chest pain, shortness of breath, hemoptysis, chills, sore throat, nausea.     Allergies:  Allergies   Allergen Reactions    Penicillins Rash       Problem List:    Patient Active Problem List    Diagnosis Date Noted    Trigger finger of left thumb 03/01/2024     Priority: Medium    Type 2 diabetes mellitus without complication, without long-term current use of insulin (H) 06/24/2021     Priority: Medium    Advanced directives, counseling/discussion 04/20/2018     Priority: Medium     Advance Care Planning 4/20/2018: ACP Review of Chart / Resources Provided:  Reviewed chart for advance care plan.  Rose Mary Rose has been provided information and resources to begin or update their advance care plan.  Added by Rachelle Buchanan            Acquired hypothyroidism 10/31/2017     Priority: Medium    HTN (hypertension) 03/31/2014     Priority: Medium    NAFLD (nonalcoholic fatty liver disease) 03/27/2014     Priority: Medium    Moderate persistent asthma 04/25/2012     Priority: Medium    Family history of diabetes mellitus 04/02/2012     Priority: Medium    GERD (gastroesophageal reflux disease) 04/02/2012     Priority: Medium     Zantac not effective      CARDIOVASCULAR SCREENING; LDL GOAL LESS THAN 130 03/19/2012     Priority: Medium    Family history of colon cancer 09/08/2009     Priority: Medium    Enlarged thyroid  with 2mm cyst  11/01/2007     Priority: Medium     Sono shows mildly enlarged thryoid with 2mm cyst right lobe. TSH wnl. Sono from 1/2002 showed prominence of thyroid and no cyst.      Obesity 08/21/2007     Priority: Medium     Problem list name updated by automated process. Provider to review          Past Medical History:    Past Medical History:   Diagnosis Date    Abnormal Papanicolaou smear of cervix and cervical HPV 2003    Advanced directives, counseling/discussion 04/20/2018    Eczema     Human papillomavirus in conditions classified elsewhere and of unspecified site     Skin cancer        Past Surgical History:    Past Surgical History:   Procedure Laterality Date    COLONOSCOPY  12/03/2012    Procedure: COLONOSCOPY;  Colonoscopy;  Surgeon: Darnell Siddiqui MD;  Location: [facility]    COLONOSCOPY N/A 06/04/2018    Procedure: COLONOSCOPY;  colonoscopy;  Surgeon: Nakul Antonio MD;  Location: [facility]    ESOPHAGOSCOPY, GASTROSCOPY, DUODENOSCOPY (EGD), COMBINED N/A 05/05/2017    Procedure: COMBINED ESOPHAGOSCOPY, GASTROSCOPY, DUODENOSCOPY (EGD), BIOPSY SINGLE OR MULTIPLE;  Gastroscopy;  Surgeon: Yonny Zambrano MD;  Location: [facility]    NO HISTORY OF SURGERY  08/2007       Family History:    Family History   Problem Relation Age of Onset    Hypertension Mother     Diabetes Mother     Anxiety Disorder Mother     C.A.D. Maternal Grandmother     Hypertension Maternal Grandmother     Cerebrovascular Disease Maternal Grandmother     Cancer Paternal Grandfather         ? type    Diabetes Paternal Grandfather         Adult onset    Cancer Maternal Grandfather         ? renal    Diabetes Father     Hypertension Father     Cancer - colorectal Father     Coronary Artery Disease Father     Anxiety Disorder Father     Respiratory Daughter         asthma    Heart Disease Brother         double bypass    C.A.D. Brother     Cancer Brother         testicular ca    Hypertension Brother     Myocardial Infarction Brother      Breast Cancer No family hx of        Social History:  Marital Status:   [2]  Social History     Tobacco Use    Smoking status: Every Day     Packs/day: 0.50     Years: 40.00     Additional pack years: 0.00     Total pack years: 20.00     Types: Cigarettes     Start date: 1/1/1983    Smokeless tobacco: Never    Tobacco comments:     5 cigarettes per day as of 6/5/2023   Vaping Use    Vaping Use: Never used   Substance Use Topics    Alcohol use: Not Currently     Comment: 1 beer per month, usually none    Drug use: No        Medications:    predniSONE (DELTASONE) 20 MG tablet  albuterol (PROAIR HFA/PROVENTIL HFA/VENTOLIN HFA) 108 (90 Base) MCG/ACT inhaler  albuterol (PROVENTIL) (2.5 MG/3ML) 0.083% neb solution  atorvastatin (LIPITOR) 10 MG tablet  budesonide-formoterol (SYMBICORT) 160-4.5 MCG/ACT Inhaler  clobetasol (TEMOVATE) 0.05 % external ointment  fluticasone (FLONASE) 50 MCG/ACT nasal spray  ipratropium - albuterol 0.5 mg/2.5 mg/3 mL (DUONEB) 0.5-2.5 (3) MG/3ML neb solution  levothyroxine (SYNTHROID/LEVOTHROID) 25 MCG tablet  lisinopril (ZESTRIL) 10 MG tablet  loratadine (CLARITIN) 10 MG tablet  metFORMIN (GLUCOPHAGE XR) 500 MG 24 hr tablet  nicotine (NICODERM CQ) 14 MG/24HR 24 hr patch        Review of Systems   Constitutional:  Positive for fever. Negative for chills and diaphoresis.   HENT:  Positive for congestion, rhinorrhea and sneezing. Negative for ear pain, facial swelling, postnasal drip and sore throat.    Respiratory:  Positive for cough and wheezing. Negative for chest tightness and shortness of breath.    Cardiovascular:  Negative for chest pain.   Gastrointestinal:  Negative for nausea.   Musculoskeletal:  Positive for myalgias.       Physical Exam   BP: 123/60  Pulse: 95  Temp: 98.6  F (37  C)  Resp: 20  SpO2: 97 %      Physical Exam  Vitals and nursing note reviewed.   Constitutional:       Appearance: Normal appearance. She is not ill-appearing or toxic-appearing.   HENT:      Right Ear:  Tympanic membrane, ear canal and external ear normal.      Left Ear: Tympanic membrane, ear canal and external ear normal.      Nose: Congestion present.      Mouth/Throat:      Mouth: Mucous membranes are moist.      Pharynx: Oropharynx is clear. No oropharyngeal exudate or posterior oropharyngeal erythema.   Eyes:      Extraocular Movements: Extraocular movements intact.      Conjunctiva/sclera: Conjunctivae normal.   Cardiovascular:      Rate and Rhythm: Normal rate and regular rhythm.      Heart sounds: Normal heart sounds.   Pulmonary:      Effort: Pulmonary effort is normal.      Breath sounds: Normal breath sounds. No wheezing.   Musculoskeletal:      Cervical back: Neck supple.   Lymphadenopathy:      Cervical: No cervical adenopathy.   Neurological:      Mental Status: She is alert.   Psychiatric:         Mood and Affect: Mood normal.         Behavior: Behavior normal.         ED Course       Results for orders placed or performed during the hospital encounter of 03/25/24 (from the past 24 hour(s))   Symptomatic Influenza A/B, RSV, & SARS-CoV2 PCR (COVID-19) Nasopharyngeal    Specimen: Nasopharyngeal; Swab   Result Value Ref Range    Influenza A PCR Positive (A) Negative    Influenza B PCR Negative Negative    RSV PCR Negative Negative    SARS CoV2 PCR Negative Negative    Narrative    Testing was performed using the Xpert Xpress CoV2/Flu/RSV Assay on the [employer] GeneXpert Instrument. This test should be ordered for the detection of SARS-CoV-2, influenza, and RSV viruses in individuals who meet clinical and/or epidemiological criteria. Test performance is unknown in asymptomatic patients. This test is for in vitro diagnostic use under the FDA EUA for laboratories certified under CLIA to perform high or moderate complexity testing. This test has not been FDA cleared or approved. A negative result does not rule out the presence of PCR inhibitors in the specimen or target RNA in concentration below the limit  of detection for the assay. If only one viral target is positive but coinfection with multiple targets is suspected, the sample should be re-tested with another FDA cleared, approved, or authorized test, if coinfection would change clinical management. This test was validated by the [facility]. These laboratories are certified under the Clinical Laboratory Improvement Amendments of 1988 (CLIA-88) as qualified to perform high complexity laboratory testing.       Medications - No data to display    Assessments & Plan (with Medical Decision Making)       Rose Mary Constantino 59-year-old female who presented with a 3-day duration of cough, viral congestion, and bodyaches.  On evaluation, her O2 sats are 97%, she is afebrile and has no increased effort of breathing.  Lungs CTAB.  Testing was positive for Influenza A, discussed with patient that she is outside of the window to receive treatment with Tamiflu. Discussed findings with the patient and recommended a 5-day course of oral prednisone to aid with cough.  At this time, based on physical exam findings and presentation, chest x-ray is not needed, however advised patient to return if she notices onset of high fever, shortness of breath or worsening of her symptoms and a chest x-ray will be done at that time to evaluate for pneumonia. Recommended that she continues symptomatic treatment along with using her albuterol and DuoNebs.  All questions answered.  Patient verbalizes understanding and agreement with the above plan.    I have reviewed the nursing notes.    I have reviewed the findings, diagnosis, plan and need for follow up with the patient.      Discharge Medication List as of 3/25/2024 12:58 PM        START taking these medications    Details   predniSONE (DELTASONE) 20 MG tablet Take two tablets (= 40mg) each day for 5 (five) days, Disp-10 tablet, R-0, Local Print             Final diagnoses:   Viral respiratory illness       3/25/2024     [facility] EMERGENCY DEPT       Doris Shelley PA-C  03/25/24 2122

## 2024-03-25 NOTE — DISCHARGE INSTRUCTIONS
You were tested for COVID/influenza, I will contact you with the results. If you notice that your symptoms are getting worse or you develop a high fever, please return for further evaluation.

## 2024-05-18 DIAGNOSIS — J45.40 MODERATE PERSISTENT ASTHMA WITHOUT COMPLICATION: ICD-10-CM

## 2024-05-20 RX ORDER — BUDESONIDE AND FORMOTEROL FUMARATE DIHYDRATE 160; 4.5 UG/1; UG/1
AEROSOL RESPIRATORY (INHALATION)
Qty: 10.2 G | Refills: 0 | Status: SHIPPED | OUTPATIENT
Start: 2024-05-20 | End: 2024-06-07

## 2024-05-28 DIAGNOSIS — J45.40 MODERATE PERSISTENT ASTHMA WITHOUT COMPLICATION: ICD-10-CM

## 2024-05-30 RX ORDER — LORATADINE 10 MG/1
TABLET ORAL
Qty: 90 TABLET | Refills: 0 | Status: SHIPPED | OUTPATIENT
Start: 2024-05-30 | End: 2024-06-07

## 2024-06-02 ENCOUNTER — HEALTH MAINTENANCE LETTER (OUTPATIENT)
Age: 59
End: 2024-06-02

## 2024-06-07 ENCOUNTER — OFFICE VISIT (OUTPATIENT)
Dept: FAMILY MEDICINE | Facility: CLINIC | Age: 59
End: 2024-06-07
Payer: COMMERCIAL

## 2024-06-07 VITALS
HEIGHT: 61 IN | SYSTOLIC BLOOD PRESSURE: 120 MMHG | DIASTOLIC BLOOD PRESSURE: 68 MMHG | WEIGHT: 145 LBS | RESPIRATION RATE: 18 BRPM | OXYGEN SATURATION: 98 % | BODY MASS INDEX: 27.38 KG/M2 | HEART RATE: 76 BPM | TEMPERATURE: 97 F

## 2024-06-07 DIAGNOSIS — E78.5 HYPERLIPIDEMIA LDL GOAL <70: ICD-10-CM

## 2024-06-07 DIAGNOSIS — Z87.891 PERSONAL HISTORY OF TOBACCO USE: ICD-10-CM

## 2024-06-07 DIAGNOSIS — E11.9 TYPE 2 DIABETES MELLITUS WITHOUT COMPLICATION, WITHOUT LONG-TERM CURRENT USE OF INSULIN (H): ICD-10-CM

## 2024-06-07 DIAGNOSIS — L30.1 DYSHIDROTIC ECZEMA: ICD-10-CM

## 2024-06-07 DIAGNOSIS — R09.81 NASAL CONGESTION: ICD-10-CM

## 2024-06-07 DIAGNOSIS — Z12.11 SCREEN FOR COLON CANCER: ICD-10-CM

## 2024-06-07 DIAGNOSIS — I10 ESSENTIAL HYPERTENSION WITH GOAL BLOOD PRESSURE LESS THAN 140/90: ICD-10-CM

## 2024-06-07 DIAGNOSIS — Z12.31 VISIT FOR SCREENING MAMMOGRAM: ICD-10-CM

## 2024-06-07 DIAGNOSIS — E03.9 ACQUIRED HYPOTHYROIDISM: ICD-10-CM

## 2024-06-07 DIAGNOSIS — Z01.419 WELL FEMALE EXAM WITH ROUTINE GYNECOLOGICAL EXAM: Primary | ICD-10-CM

## 2024-06-07 DIAGNOSIS — J45.40 MODERATE PERSISTENT ASTHMA WITHOUT COMPLICATION: ICD-10-CM

## 2024-06-07 DIAGNOSIS — Z72.0 TOBACCO ABUSE: ICD-10-CM

## 2024-06-07 DIAGNOSIS — Z12.4 CERVICAL CANCER SCREENING: ICD-10-CM

## 2024-06-07 LAB
ANION GAP SERPL CALCULATED.3IONS-SCNC: 11 MMOL/L (ref 7–15)
BUN SERPL-MCNC: 11.2 MG/DL (ref 8–23)
CALCIUM SERPL-MCNC: 9.9 MG/DL (ref 8.8–10.2)
CHLORIDE SERPL-SCNC: 103 MMOL/L (ref 98–107)
CHOLEST SERPL-MCNC: 153 MG/DL
CREAT SERPL-MCNC: 0.69 MG/DL (ref 0.51–0.95)
DEPRECATED HCO3 PLAS-SCNC: 25 MMOL/L (ref 22–29)
EGFRCR SERPLBLD CKD-EPI 2021: >90 ML/MIN/1.73M2
FASTING STATUS PATIENT QL REPORTED: YES
FASTING STATUS PATIENT QL REPORTED: YES
GLUCOSE SERPL-MCNC: 125 MG/DL (ref 70–99)
HBA1C MFR BLD: 7.2 % (ref 0–5.6)
HDLC SERPL-MCNC: 73 MG/DL
LDLC SERPL CALC-MCNC: 58 MG/DL
NONHDLC SERPL-MCNC: 80 MG/DL
POTASSIUM SERPL-SCNC: 5 MMOL/L (ref 3.4–5.3)
SODIUM SERPL-SCNC: 139 MMOL/L (ref 135–145)
TRIGL SERPL-MCNC: 108 MG/DL
TSH SERPL DL<=0.005 MIU/L-ACNC: 1.91 UIU/ML (ref 0.3–4.2)

## 2024-06-07 PROCEDURE — G0145 SCR C/V CYTO,THINLAYER,RESCR: HCPCS | Performed by: NURSE PRACTITIONER

## 2024-06-07 PROCEDURE — G0296 VISIT TO DETERM LDCT ELIG: HCPCS | Performed by: NURSE PRACTITIONER

## 2024-06-07 PROCEDURE — 87624 HPV HI-RISK TYP POOLED RSLT: CPT | Performed by: NURSE PRACTITIONER

## 2024-06-07 PROCEDURE — 99214 OFFICE O/P EST MOD 30 MIN: CPT | Mod: 25 | Performed by: NURSE PRACTITIONER

## 2024-06-07 PROCEDURE — 90677 PCV20 VACCINE IM: CPT | Performed by: NURSE PRACTITIONER

## 2024-06-07 PROCEDURE — 90471 IMMUNIZATION ADMIN: CPT | Performed by: NURSE PRACTITIONER

## 2024-06-07 PROCEDURE — 80061 LIPID PANEL: CPT | Performed by: NURSE PRACTITIONER

## 2024-06-07 PROCEDURE — 80048 BASIC METABOLIC PNL TOTAL CA: CPT | Performed by: NURSE PRACTITIONER

## 2024-06-07 PROCEDURE — 99396 PREV VISIT EST AGE 40-64: CPT | Mod: 25 | Performed by: NURSE PRACTITIONER

## 2024-06-07 PROCEDURE — 84443 ASSAY THYROID STIM HORMONE: CPT | Performed by: NURSE PRACTITIONER

## 2024-06-07 PROCEDURE — 83036 HEMOGLOBIN GLYCOSYLATED A1C: CPT | Performed by: NURSE PRACTITIONER

## 2024-06-07 PROCEDURE — 36415 COLL VENOUS BLD VENIPUNCTURE: CPT | Performed by: NURSE PRACTITIONER

## 2024-06-07 RX ORDER — METFORMIN HCL 500 MG
500 TABLET, EXTENDED RELEASE 24 HR ORAL 2 TIMES DAILY WITH MEALS
Qty: 180 TABLET | Refills: 3 | Status: SHIPPED | OUTPATIENT
Start: 2024-06-07

## 2024-06-07 RX ORDER — LORATADINE 10 MG/1
1 TABLET ORAL DAILY
Qty: 90 TABLET | Refills: 3 | Status: SHIPPED | OUTPATIENT
Start: 2024-06-07

## 2024-06-07 RX ORDER — NICOTINE 21 MG/24HR
1 PATCH, TRANSDERMAL 24 HOURS TRANSDERMAL EVERY 24 HOURS
Qty: 30 PATCH | Refills: 0 | Status: SHIPPED | OUTPATIENT
Start: 2024-06-07

## 2024-06-07 RX ORDER — ATORVASTATIN CALCIUM 10 MG/1
10 TABLET, FILM COATED ORAL DAILY
Qty: 90 TABLET | Refills: 3 | Status: SHIPPED | OUTPATIENT
Start: 2024-06-07

## 2024-06-07 RX ORDER — LISINOPRIL 10 MG/1
10 TABLET ORAL DAILY
Qty: 90 TABLET | Refills: 3 | Status: SHIPPED | OUTPATIENT
Start: 2024-06-07

## 2024-06-07 RX ORDER — CLOBETASOL PROPIONATE 0.5 MG/G
OINTMENT TOPICAL
Qty: 60 G | Refills: 5 | Status: SHIPPED | OUTPATIENT
Start: 2024-06-07

## 2024-06-07 RX ORDER — ALBUTEROL SULFATE 90 UG/1
AEROSOL, METERED RESPIRATORY (INHALATION)
Qty: 25.5 G | Refills: 11 | Status: SHIPPED | OUTPATIENT
Start: 2024-06-07

## 2024-06-07 RX ORDER — IPRATROPIUM BROMIDE AND ALBUTEROL SULFATE 2.5; .5 MG/3ML; MG/3ML
1 SOLUTION RESPIRATORY (INHALATION) EVERY 6 HOURS PRN
Qty: 720 ML | Refills: 5 | Status: SHIPPED | OUTPATIENT
Start: 2024-06-07

## 2024-06-07 RX ORDER — FLUTICASONE PROPIONATE 50 MCG
SPRAY, SUSPENSION (ML) NASAL
Qty: 32 G | Refills: 11 | Status: SHIPPED | OUTPATIENT
Start: 2024-06-07

## 2024-06-07 RX ORDER — ALBUTEROL SULFATE 0.83 MG/ML
SOLUTION RESPIRATORY (INHALATION)
Qty: 1080 ML | Refills: 11 | Status: SHIPPED | OUTPATIENT
Start: 2024-06-07

## 2024-06-07 RX ORDER — LEVOTHYROXINE SODIUM 25 UG/1
25 TABLET ORAL DAILY
Qty: 90 TABLET | Refills: 3 | Status: SHIPPED | OUTPATIENT
Start: 2024-06-07

## 2024-06-07 RX ORDER — BUDESONIDE AND FORMOTEROL FUMARATE DIHYDRATE 160; 4.5 UG/1; UG/1
AEROSOL RESPIRATORY (INHALATION)
Qty: 10.2 G | Refills: 0 | Status: SHIPPED | OUTPATIENT
Start: 2024-06-07 | End: 2024-06-07

## 2024-06-07 RX ORDER — BUDESONIDE AND FORMOTEROL FUMARATE DIHYDRATE 160; 4.5 UG/1; UG/1
AEROSOL RESPIRATORY (INHALATION)
Qty: 30.6 G | Refills: 3 | Status: SHIPPED | OUTPATIENT
Start: 2024-06-07

## 2024-06-07 SDOH — HEALTH STABILITY: PHYSICAL HEALTH: ON AVERAGE, HOW MANY MINUTES DO YOU ENGAGE IN EXERCISE AT THIS LEVEL?: 20 MIN

## 2024-06-07 SDOH — HEALTH STABILITY: PHYSICAL HEALTH: ON AVERAGE, HOW MANY DAYS PER WEEK DO YOU ENGAGE IN MODERATE TO STRENUOUS EXERCISE (LIKE A BRISK WALK)?: 4 DAYS

## 2024-06-07 ASSESSMENT — ASTHMA QUESTIONNAIRES: ACT_TOTALSCORE: 22

## 2024-06-07 ASSESSMENT — PAIN SCALES - GENERAL: PAINLEVEL: NO PAIN (0)

## 2024-06-07 ASSESSMENT — SOCIAL DETERMINANTS OF HEALTH (SDOH): HOW OFTEN DO YOU GET TOGETHER WITH FRIENDS OR RELATIVES?: THREE TIMES A WEEK

## 2024-06-07 NOTE — PATIENT INSTRUCTIONS
Lung Cancer Screening   Frequently Asked Questions  If you are at high-risk for lung cancer, getting screened with low-dose computed tomography (LDCT) every year can help save your life. This handout offers answers to some of the most common questions about lung cancer screening. If you have other questions, please call [phone] ([phone]).     What is it?  Lung cancer screening uses special X-ray technology to create an image of your lung tissue. The exam is quick and easy and takes less than 10 seconds. We don t give you any medicine or use any needles. You can eat before and after the exam. You don t need to change your clothes as long as the clothing on your chest doesn t contain metal. But, you do need to be able to hold your breath for at least 6 seconds during the exam.    What is the goal of lung cancer screening?  The goal of lung cancer screening is to save lives. Many times, lung cancer is not found until a person starts having physical symptoms. Lung cancer screening can help detect lung cancer in the earliest stages when it may be easier to treat.    Who should be screened for lung cancer?  We suggest lung cancer screening for anyone who is at high-risk for lung cancer. You are in the high-risk group if you:      are between the ages of 55 and 79, and    have smoked at least 1 pack of cigarettes a day for 20 or more years, and    still smoke or have quit within the past 15 years.    However, if you have a new cough or shortness of breath, you should talk to your doctor before being screened.    Why does it matter if I have symptoms?  Certain symptoms can be a sign that you have a condition in your lungs that should be checked and treated by your doctor. These symptoms include fever, chest pain, a new or changing cough, shortness of breath that you have never felt before, coughing up blood or unexplained weight loss. Having any of these symptoms can greatly affect the results of lung  cancer screening.       Should all smokers get an LDCT lung cancer screening exam?  It depends. Lung cancer screening is for a very specific group of men and women who have a history of heavy smoking over a long period of time (see  Who should be screened for lung cancer  above).  I am in the high-risk group, but have been diagnosed with cancer in the past. Is LDCT lung cancer screening right for me?  In some cases, you should not have LDCT lung screening, such as when your doctor is already following your cancer with CT scan studies. Your doctor will help you decide if LDCT lung screening is right for you.  Do I need to have a screening exam every year?  Yes. If you are in the high-risk group described earlier, you should get an LDCT lung cancer screening exam every year until you are 79, or are no longer willing or able to undergo screening and possible procedures to diagnose and treat lung cancer.  How effective is LDCT at preventing death from lung cancer?  Studies have shown that LDCT lung cancer screening can lower the risk of death from lung cancer by 20 percent in people who are at high-risk.  What are the risks?  There are some risks and limitations of LDCT lung cancer screening. We want to make sure you understand the risks and benefits, so please let us know if you have any questions. Your doctor may want to talk with you more about these risks.    Radiation exposure: As with any exam that uses radiation, there is a very small increased risk of cancer. The amount of radiation in LDCT is small--about the same amount a person would get from a mammogram. Your doctor orders the exam when he or she feels the potential benefits outweigh the risks.    False negatives: No test is perfect, including LDCT. It is possible that you may have a medical condition, including lung cancer, that is not found during your exam. This is called a false negative result.    False positives and more testing: LDCT very often finds  something in the lung that could be cancer, but in fact is not. This is called a false positive result. False positive tests often cause anxiety. To make sure these findings are not cancer, you may need to have more tests. These tests will be done only if you give us permission. Sometimes patients need a treatment that can have side effects, such as a biopsy. For more information on false positives, see  What can I expect from the results?     Findings not related to lung cancer: Your LDCT exam also takes pictures of areas of your body next to your lungs. In a very small number of cases, the CT scan will show an abnormal finding in one of these areas, such as your kidneys, adrenal glands, liver or thyroid. This finding may not be serious, but you may need more tests. Your doctor can help you decide what other tests you may need, if any.  What can I expect from the results?  About 1 out of 4 LDCT exams will find something that may need more tests. Most of the time, these findings are lung nodules. Lung nodules are very small collections of tissue in the lung. These nodules are very common, and the vast majority--more than 97 percent--are not cancer (benign). Most are normal lymph nodes or small areas of scarring from past infections.  But, if a small lung nodule is found to be cancer, the cancer can be cured more than 90 percent of the time. To know if the nodule is cancer, we may need to get more images before your next yearly screening exam. If the nodule has suspicious features (for example, it is large, has an odd shape or grows over time), we will refer you to a specialist for further testing.  Will my doctor also get the results?  Yes. Your doctor will get a copy of your results.  Is it okay to keep smoking now that there s a cancer screening exam?  No. Tobacco is one of the strongest cancer-causing agents. It causes not only lung cancer, but other cancers and cardiovascular (heart) diseases as well. The damage  caused by smoking builds over time. This means that the longer you smoke, the higher your risk of disease. While it is never too late to quit, the sooner you quit, the better.  Where can I find help to quit smoking?  The best way to prevent lung cancer is to stop smoking. If you have already quit smoking, congratulations and keep it up! For help on quitting smoking, please call [employer] at 7-936-QUITNOW (1-911.421.4035) or the American [employer] Society at 1-500.925.3790 to find local resources near you.  One-on-one health coaching:  If you d prefer to work individually with a health care provider on tobacco cessation, we offer:      Medication Therapy Management:  Our specially trained pharmacists work closely with you and your doctor to help you quit smoking.  Call [phone] or [phone] (toll free).

## 2024-06-07 NOTE — PROGRESS NOTES
Lung Cancer Screening Shared Decision Making Visit     Rose Mary Constantino, a 59 year old female, is eligible for lung cancer screening    History   Smoking Status     Every Day     Packs/day: 0.50     Years: 40.00     Types: Cigarettes     Start date: 1/1/1983   Smokeless Tobacco     Never       I have discussed with patient the risks and benefits of screening for lung cancer with low-dose CT.     The risks include:    radiation exposure: one low dose chest CT has as much ionizing radiation as about 15 chest x-rays, or 6 months of background radiation living in [place]      false positives: most findings/nodules are NOT cancer, but some might still require additional diagnostic evaluation, including biopsy    over-diagnosis: some slow growing cancers that might never have been clinically significant will be detected and treated unnecessarily     The benefit of early detection of lung cancer is contingent upon adherence to annual screening or more frequent follow up if indicated.     Furthermore, to benefit from screening, Rose Mary must be willing and able to undergo diagnostic procedures, if indicated. Although no specific guide is available for determining severity of comorbidities, it is reasonable to withhold screening in patients who have greater mortality risk from other diseases.     We did discuss that the best way to prevent lung cancer is to not smoke.    Some patients may value a numeric estimation of lung cancer risk when evaluating if lung cancer screening is right for them, here is one calculator:    ShouldIScreen

## 2024-06-07 NOTE — PROGRESS NOTES
Preventive Care Visit  [facility]  REDD Eubanks CNP, Family Medicine  Jun 7, 2024          Assessment & Plan     Well female exam with routine gynecological exam      Type 2 diabetes mellitus without complication, without long-term current use of insulin (H)  Due for A1c today.  Tolerating metformin at current dose.  - HEMOGLOBIN A1C; Future  - Lipid panel reflex to direct LDL Fasting; Future  - metFORMIN (GLUCOPHAGE XR) 500 MG 24 hr tablet; Take 1 tablet (500 mg) by mouth 2 times daily (with meals)  - OFFICE/OUTPT VISIT,EST,LEVL IV    Acquired hypothyroidism  Due for TSH.  No current symptoms.   - TSH; Future  - levothyroxine (SYNTHROID/LEVOTHROID) 25 MCG tablet; Take 1 tablet (25 mcg) by mouth daily  - OFFICE/OUTPT VISIT,EST,LEVL IV    Moderate persistent asthma without complication  Well controlled.  - loratadine (CLARITIN) 10 MG tablet; Take 1 tablet (10 mg) by mouth daily  - albuterol (PROAIR HFA/PROVENTIL HFA/VENTOLIN HFA) 108 (90 Base) MCG/ACT inhaler; USE 2 INHALATIONS EVERY SIX HOURS AS NEEDED FOR SHORTNESS OF BREATH / DYSPNEA OR WHEEZING  - albuterol (PROVENTIL) (2.5 MG/3ML) 0.083% neb solution; USE 1 VIAL VIA NEBULIZER EVERY FOUR HOURS AS NEEDED FOR SHORTNESS OF BREATH / DYSPNEA  - ipratropium - albuterol 0.5 mg/2.5 mg/3 mL (DUONEB) 0.5-2.5 (3) MG/3ML neb solution; Take 1 vial (3 mLs) by nebulization every 6 hours as needed for shortness of breath or wheezing USE 1 VIAL VIA NEBULIZER EVERY SIX HOURS AS NEEDED FOR SHORTNESS OF BREATH/ DYSPNEA OR WHEEZING  - budesonide-formoterol (SYMBICORT) 160-4.5 MCG/ACT Inhaler; USE 2 INHALATIONS TWICE A DAY  - OFFICE/OUTPT VISIT,EST,LEVL IV    Essential hypertension with goal blood pressure less than 140/90  Well-controlled  - BASIC METABOLIC PANEL; Future  - lisinopril (ZESTRIL) 10 MG tablet; Take 1 tablet (10 mg) by mouth daily  - OFFICE/OUTPT VISIT,EST,LEVL IV    Hyperlipidemia LDL goal <70  Due for lipids today  - atorvastatin  "(LIPITOR) 10 MG tablet; Take 1 tablet (10 mg) by mouth daily  - OFFICE/OUTPT VISIT,EST,LEVL IV    Nasal congestion  Well-controlled  - fluticasone (FLONASE) 50 MCG/ACT nasal spray; USE 1 SPRAY NASALLY DAILY  - OFFICE/OUTPT VISIT,EST,LEVL IV    Dyshidrotic eczema  Well-controlled  - clobetasol (TEMOVATE) 0.05 % external ointment; Apply 1 g to hands and feet BID x 2-3 weeks then PRN  - OFFICE/OUTPT VISIT,EST,LEVL IV    Tobacco abuse  Patient continues to work on smoking cessation.  Asking for refill of nicotine patches today.  - nicotine (NICODERM CQ) 14 MG/24HR 24 hr patch; Place 1 patch onto the skin every 24 hours  - OFFICE/OUTPT VISIT,EST,LEVL IV    Visit for screening mammogram  - MA Screening Bilateral w/ Kho[facility]; Future    Cervical cancer screening  - Gynecologic Cytology (Pap) and HPV - Recommended Age 30-65 Years    Screen for colon cancer  - Colonoscopy Screening  Referral; Future    Personal history of tobacco use  - Prof fee: Shared Decision Making for Lung Cancer Screening  - CT Chest Lung Cancer Scrn Low Dose wo; Future      Nicotine/Tobacco Cessation  She reports that she has been smoking cigarettes. She started smoking about 41 years ago. She has a 20.7 pack-year smoking history. She has never used smokeless tobacco.  Nicotine/Tobacco Cessation Plan  Pharmacotherapies : Nicotine patch      BMI  Estimated body mass index is 27.4 kg/m  as calculated from the following:    Height as of this encounter: 1.549 m (5' 1\").    Weight as of this encounter: 65.8 kg (145 lb).   Weight management plan: Discussed healthy diet and exercise guidelines    Counseling  Appropriate preventive services were discussed with this patient, including applicable screening as appropriate for fall prevention, nutrition, physical activity, Tobacco-use cessation, weight loss and cognition.  Checklist reviewing preventive services available has been given to the patient.  Reviewed patient's diet, addressing concerns and/or " questions.   She is at risk for psychosocial distress and has been provided with information to reduce risk.     The risks, benefits and treatment options of prescribed medications or other treatments have been discussed with the patient. The patient verbalized their understanding and should call or follow up if no improvement or if they develop further problems.  Soledad Bauman, MICHAEL                Subjective   Zoraida is a 59 year old, presenting for the following:  Physical        6/7/2024     7:34 AM   Additional Questions   Roomed by Carmella TAM CMA        Health Care Directive  Patient does not have a Health Care Directive or Living Will:   Discussed and information given to patient.    HPI      Diabetes Follow-up    How often are you checking your blood sugar? Not at all  What concerns do you have today about your diabetes? None   Do you have any of these symptoms? (Select all that apply)  No numbness or tingling in feet.  No redness, sores or blisters on feet.  No complaints of excessive thirst.  No reports of blurry vision.  No significant changes to weight.          Hyperlipidemia Follow-Up    Are you regularly taking any medication or supplement to lower your cholesterol?   Yes- atorvastatin  Are you having muscle aches or other side effects that you think could be caused by your cholesterol lowering medication?  No    Hypertension Follow-up    Do you check your blood pressure regularly outside of the clinic? No   Are you following a low salt diet? Yes  Are your blood pressures ever more than 140 on the top number (systolic) OR more   than 90 on the bottom number (diastolic), for example 140/90? No    BP Readings from Last 2 Encounters:   06/07/24 120/68   03/25/24 123/60     Hemoglobin A1C (%)   Date Value   01/05/2024 7.2 (H)   06/05/2023 6.9 (H)   06/23/2021 7.0 (H)   06/10/2017 6.0     LDL Cholesterol Calculated (mg/dL)   Date Value   06/05/2023 69   06/02/2022 89   06/08/2020 126 (H)   06/03/2019 103  (H)         Asthma Follow-Up    Was ACT completed today?  Yes        6/7/2024     8:23 AM   ACT Total Scores   ACT TOTAL SCORE (Goal Greater than or Equal to 20) 22   In the past 12 months, how many times did you visit the emergency room for your asthma without being admitted to the hospital? 0   In the past 12 months, how many times were you hospitalized overnight because of your asthma? 0        How many days per week do you miss taking your asthma controller medication?  0  Please describe any recent triggers for your asthma: None  Have you had any Emergency Room Visits, Urgent Care Visits, or Hospital Admissions since your last office visit?  No      Hypothyroidism Follow-up    Since last visit, patient describes the following symptoms: Weight stable, no hair loss, no skin changes, no constipation, no loose stools    Medication Followup of flonase and claritin for allergies  Clobetasol for eczema  Nicotine patches for smoking cessation  Taking Medication as prescribed: yes  Side Effects:  None  Medication Helping Symptoms:  yes              6/7/2024   General Health   How would you rate your overall physical health? Good   Feel stress (tense, anxious, or unable to sleep) Only a little   (!) STRESS CONCERN      6/7/2024   Nutrition   Three or more servings of calcium each day? (!) NO   Diet: Diabetic   How many servings of fruit and vegetables per day? (!) 0-1   How many sweetened beverages each day? (!) 2         6/7/2024   Exercise   Days per week of moderate/strenous exercise 4 days   Average minutes spent exercising at this level 20 min         6/7/2024   Social Factors   Frequency of gathering with friends or relatives Three times a week   Worry food won't last until get money to buy more No   Food not last or not have enough money for food? No   Do you have housing?  Yes   Are you worried about losing your housing? No   Lack of transportation? No   Unable to get utilities (heat,electricity)? No          6/7/2024   Fall Risk   Fallen 2 or more times in the past year? No   Trouble with walking or balance? No          6/7/2024   Dental   Dentist two times every year? Yes         6/7/2024   TB Screening   Were you born outside of the [place]? No           Today's PHQ-2 Score:       1/9/2024     6:49 AM   PHQ-2 ( 1999 [employer])   Q1: Little interest or pleasure in doing things 0   Q2: Feeling down, depressed or hopeless 0   PHQ-2 Score 0         6/7/2024   Substance Use   If I could quit smoking, I would Completely agree   I want to quit somking, worry about health affects Completely agree   Willing to make a plan to quit smoking Completely agree   Willing to cut down before quitting Completely agree   Alcohol more than 3/day or more than 7/wk No   Do you use any other substances recreationally? No     Social History     Tobacco Use    Smoking status: Every Day     Current packs/day: 0.50     Average packs/day: 0.5 packs/day for 41.4 years (20.7 ttl pk-yrs)     Types: Cigarettes     Start date: 1/1/1983    Smokeless tobacco: Never    Tobacco comments:     5 cigarettes per day as of 6/5/2023   Vaping Use    Vaping status: Never Used   Substance Use Topics    Alcohol use: Not Currently     Comment: 1 beer per month, usually none    Drug use: No           6/5/2023   LAST FHS-7 RESULTS   1st degree relative breast or ovarian cancer Unknown   Any relative bilateral breast cancer Unknown   Any male have breast cancer Unknown   Any ONE woman have BOTH breast AND ovarian cancer Unknown   Any woman with breast cancer before 50yrs No   2 or more relatives with breast AND/OR ovarian cancer No   2 or more relatives with breast AND/OR bowel cancer Yes                6/7/2024   STI Screening   New sexual partner(s) since last STI/HIV test? No     History of abnormal Pap smear: YES - once when she was a teenager - all normal since.        Latest Ref Rng & Units 6/23/2021     8:03 AM 6/23/2021     8:00 AM 6/5/2018     8:26 AM   PAP / HPV  "  PAP (Historical)  NIL   NIL    HPV 16 DNA NEG^Negative  Negative     HPV 18 DNA NEG^Negative  Negative     Other HR HPV NEG^Negative  Negative       ASCVD Risk   The 10-year ASCVD risk score (Ana MUÑIZ, et al., 2019) is: 12.6%    Values used to calculate the score:      Age: 59 years      Sex: Female      Is Non- : No      Diabetic: Yes      Tobacco smoker: Yes      Systolic Blood Pressure: 120 mmHg      Is BP treated: Yes      HDL Cholesterol: 66 mg/dL      Total Cholesterol: 161 mg/dL           Reviewed and updated as needed this visit by Provider                          Review of Systems  Constitutional, neuro, ENT, endocrine, pulmonary, cardiac, gastrointestinal, genitourinary, musculoskeletal, integument and psychiatric systems are negative, except as otherwise noted.     Objective    Exam  /68 (BP Location: Right arm, Patient Position: Sitting, Cuff Size: Adult Regular)   Pulse 76   Temp 97  F (36.1  C) (Tympanic)   Resp 18   Ht 1.549 m (5' 1\")   Wt 65.8 kg (145 lb)   LMP 04/06/2016   SpO2 98%   BMI 27.40 kg/m     Estimated body mass index is 27.4 kg/m  as calculated from the following:    Height as of this encounter: 1.549 m (5' 1\").    Weight as of this encounter: 65.8 kg (145 lb).    Physical Exam  GENERAL: alert and no distress  EYES: Eyes grossly normal to inspection, PERRL and conjunctivae and sclerae normal  HENT: ear canals and TM's normal, nose and mouth without ulcers or lesions  NECK: no adenopathy, no asymmetry, masses, or scars  RESP: lungs clear to auscultation - no rales, rhonchi or wheezes  BREAST: normal without masses, tenderness or nipple discharge and no palpable axillary masses or adenopathy  CV: regular rate and rhythm, normal S1 S2, no S3 or S4, no murmur, click or rub, no peripheral edema  ABDOMEN: soft, nontender, no hepatosplenomegaly, no masses and bowel sounds normal   (female): normal female external genitalia, normal urethral " meatus, normal vaginal mucosa  MS: no gross musculoskeletal defects noted, no edema  SKIN: no suspicious lesions or rashes  NEURO: Normal strength and tone, mentation intact and speech normal  PSYCH: mentation appears normal, affect normal/bright        Signed Electronically by: REDD Eubanks CNP

## 2024-06-09 LAB
HPV HR 12 DNA CVX QL NAA+PROBE: NEGATIVE
HPV16 DNA CVX QL NAA+PROBE: NEGATIVE
HPV18 DNA CVX QL NAA+PROBE: NEGATIVE
HUMAN PAPILLOMA VIRUS FINAL DIAGNOSIS: NORMAL

## 2024-06-10 ENCOUNTER — TELEPHONE (OUTPATIENT)
Dept: FAMILY MEDICINE | Facility: CLINIC | Age: 59
End: 2024-06-10
Payer: COMMERCIAL

## 2024-06-11 NOTE — TELEPHONE ENCOUNTER
Former[facility] wellness screening form prepared and routed to Soledad Bauman for review and signature.

## 2024-06-13 DIAGNOSIS — J45.40 MODERATE PERSISTENT ASTHMA WITHOUT COMPLICATION: ICD-10-CM

## 2024-06-13 LAB
BKR LAB AP GYN ADEQUACY: NORMAL
BKR LAB AP GYN INTERPRETATION: NORMAL
BKR LAB AP PREVIOUS ABNORMAL: NORMAL
PATH REPORT.COMMENTS IMP SPEC: NORMAL
PATH REPORT.COMMENTS IMP SPEC: NORMAL
PATH REPORT.RELEVANT HX SPEC: NORMAL

## 2024-06-13 RX ORDER — BUDESONIDE AND FORMOTEROL FUMARATE DIHYDRATE 160; 4.5 UG/1; UG/1
2 AEROSOL RESPIRATORY (INHALATION) 2 TIMES DAILY
Qty: 10.2 G | Refills: 0 | Status: SHIPPED | OUTPATIENT
Start: 2024-06-13 | End: 2024-07-13

## 2024-06-13 RX ORDER — BUDESONIDE AND FORMOTEROL FUMARATE DIHYDRATE 160; 4.5 UG/1; UG/1
AEROSOL RESPIRATORY (INHALATION)
Qty: 30.6 G | Refills: 3 | Status: CANCELLED | OUTPATIENT
Start: 2024-06-13

## 2024-06-13 NOTE — TELEPHONE ENCOUNTER
Medication Question or Refill    Patient calling stating she was seen last week and requested Symbicort be sent to [facility] while she waits for her express scripts, [facility] doesn't have anything. Please resend.     What medication are you calling about (include dose and sig)?: budesonide-formoterol (SYMBICORT) 160-4.5 MCG/ACT Inhaler     Preferred Pharmacy: [facility]     Controlled Substance Agreement on file:   CSA -- Patient Level:    CSA: None found at the patient level.       Who prescribed the medication?: Soledad Bauman      Do you need a refill? Yes        Could we send this information to you in [facility] or would you prefer to receive a phone call?:   Patient would like to be contacted via [facility]      MONIQUE Rodriguez

## 2024-06-14 NOTE — TELEPHONE ENCOUNTER
Form completed, signed, and faxed to [facility] at [phone]. Copy of form sent to scan and copy placed in cabinet. Original mailed to patient per patient request.

## 2024-06-17 PROBLEM — Z71.89 ADVANCED DIRECTIVES, COUNSELING/DISCUSSION: Status: RESOLVED | Noted: 2018-04-20 | Resolved: 2024-06-17

## 2024-06-28 ENCOUNTER — APPOINTMENT (OUTPATIENT)
Dept: GENERAL RADIOLOGY | Facility: CLINIC | Age: 59
End: 2024-06-28
Payer: COMMERCIAL

## 2024-06-28 ENCOUNTER — HOSPITAL ENCOUNTER (EMERGENCY)
Facility: CLINIC | Age: 59
Discharge: HOME | End: 2024-06-28
Payer: COMMERCIAL

## 2024-06-28 VITALS
SYSTOLIC BLOOD PRESSURE: 166 MMHG | HEART RATE: 63 BPM | RESPIRATION RATE: 16 BRPM | TEMPERATURE: 98.1 F | OXYGEN SATURATION: 98 % | DIASTOLIC BLOOD PRESSURE: 69 MMHG

## 2024-06-28 DIAGNOSIS — J20.8 VIRAL BRONCHITIS: ICD-10-CM

## 2024-06-28 PROCEDURE — 99213 OFFICE O/P EST LOW 20 MIN: CPT

## 2024-06-28 PROCEDURE — 71046 X-RAY EXAM CHEST 2 VIEWS: CPT

## 2024-06-28 PROCEDURE — G0463 HOSPITAL OUTPT CLINIC VISIT: HCPCS | Mod: 25

## 2024-06-28 RX ORDER — PREDNISONE 20 MG/1
TABLET ORAL
Qty: 10 TABLET | Refills: 0 | Status: SHIPPED | OUTPATIENT
Start: 2024-06-28

## 2024-06-28 ASSESSMENT — ACTIVITIES OF DAILY LIVING (ADL): ADLS_ACUITY_SCORE: 35

## 2024-06-28 NOTE — ED PROVIDER NOTES
History   No chief complaint on file.    HPI  Rose Mary Constantino is a 59 year old female with PMH significant for moderate persistent asthma and tobacco use who presents for evaluation of cough, congestion and fatigue that initially began a few days ago.  She is concerned she might have bronchitis as she has experienced this a few times in the past.  Is noticing some tightness in her chest with breathing. She has been using nebulizers as directed which are helping a little with symptoms.  Also felt chills and subjective fever this morning.  Denies any chest pain, shortness of breath, palpitations, nausea, vomiting, or diarrhea.    Allergies:  Allergies   Allergen Reactions    Penicillins Rash       Problem List:    Patient Active Problem List    Diagnosis Date Noted    Trigger finger of left thumb 03/01/2024     Priority: Medium    Type 2 diabetes mellitus without complication, without long-term current use of insulin (H) 06/24/2021     Priority: Medium    Acquired hypothyroidism 10/31/2017     Priority: Medium    HTN (hypertension) 03/31/2014     Priority: Medium    NAFLD (nonalcoholic fatty liver disease) 03/27/2014     Priority: Medium    Moderate persistent asthma 04/25/2012     Priority: Medium    Family history of diabetes mellitus 04/02/2012     Priority: Medium    GERD (gastroesophageal reflux disease) 04/02/2012     Priority: Medium     Zantac not effective      CARDIOVASCULAR SCREENING; LDL GOAL LESS THAN 130 03/19/2012     Priority: Medium    Family history of colon cancer 09/08/2009     Priority: Medium    Enlarged thyroid with 2mm cyst  11/01/2007     Priority: Medium     Sono shows mildly enlarged thryoid with 2mm cyst right lobe. TSH wnl. Sono from 1/2002 showed prominence of thyroid and no cyst.      Obesity 08/21/2007     Priority: Medium     Problem list name updated by automated process. Provider to review          Past Medical History:    Past Medical History:   Diagnosis Date    Abnormal  Papanicolaou smear of cervix and cervical HPV 2003    Advanced directives, counseling/discussion 04/20/2018    Eczema     Human papillomavirus in conditions classified elsewhere and of unspecified site     Skin cancer        Past Surgical History:    Past Surgical History:   Procedure Laterality Date    COLONOSCOPY  12/03/2012    Procedure: COLONOSCOPY;  Colonoscopy;  Surgeon: Darnell Siddiqui MD;  Location: [facility]    COLONOSCOPY N/A 06/04/2018    Procedure: COLONOSCOPY;  colonoscopy;  Surgeon: Nakul Antonio MD;  Location: [facility]    ESOPHAGOSCOPY, GASTROSCOPY, DUODENOSCOPY (EGD), COMBINED N/A 05/05/2017    Procedure: COMBINED ESOPHAGOSCOPY, GASTROSCOPY, DUODENOSCOPY (EGD), BIOPSY SINGLE OR MULTIPLE;  Gastroscopy;  Surgeon: Yonny Zambrano MD;  Location: [facility]    NO HISTORY OF SURGERY  08/2007       Family History:    Family History   Problem Relation Age of Onset    Hypertension Mother     Diabetes Mother     Anxiety Disorder Mother     C.A.D. Maternal Grandmother     Hypertension Maternal Grandmother     Cerebrovascular Disease Maternal Grandmother     Cancer Paternal Grandfather         ? type    Diabetes Paternal Grandfather         Adult onset    Cancer Maternal Grandfather         ? renal    Diabetes Father     Hypertension Father     Cancer - colorectal Father     Coronary Artery Disease Father     Anxiety Disorder Father     Respiratory Daughter         asthma    Heart Disease Brother         double bypass    C.A.D. Brother     Cancer Brother         testicular ca    Hypertension Brother     Myocardial Infarction Brother     Breast Cancer No family hx of        Social History:  Marital Status:   [2]  Social History     Tobacco Use    Smoking status: Every Day     Current packs/day: 0.50     Average packs/day: 0.5 packs/day for 41.5 years (20.7 ttl pk-yrs)     Types: Cigarettes     Start date: 1/1/1983    Smokeless tobacco: Never    Tobacco comments:     5 cigarettes per day as of 6/5/2023   Vaping  Use    Vaping status: Never Used   Substance Use Topics    Alcohol use: Not Currently     Comment: 1 beer per month, usually none    Drug use: No        Medications:    predniSONE (DELTASONE) 20 MG tablet  albuterol (PROAIR HFA/PROVENTIL HFA/VENTOLIN HFA) 108 (90 Base) MCG/ACT inhaler  albuterol (PROVENTIL) (2.5 MG/3ML) 0.083% neb solution  atorvastatin (LIPITOR) 10 MG tablet  budesonide-formoterol (SYMBICORT) 160-4.5 MCG/ACT Inhaler  budesonide-formoterol (SYMBICORT) 160-4.5 MCG/ACT Inhaler  clobetasol (TEMOVATE) 0.05 % external ointment  fluticasone (FLONASE) 50 MCG/ACT nasal spray  ipratropium - albuterol 0.5 mg/2.5 mg/3 mL (DUONEB) 0.5-2.5 (3) MG/3ML neb solution  levothyroxine (SYNTHROID/LEVOTHROID) 25 MCG tablet  lisinopril (ZESTRIL) 10 MG tablet  loratadine (CLARITIN) 10 MG tablet  metFORMIN (GLUCOPHAGE XR) 500 MG 24 hr tablet  nicotine (NICODERM CQ) 14 MG/24HR 24 hr patch  predniSONE (DELTASONE) 20 MG tablet          Review of Systems  Pertinent review of systems as documented per HPI above.    Physical Exam   BP: (!) 166/69  Pulse: 63  Temp: 98.1  F (36.7  C)  Resp: 16  SpO2: 98 %      Physical Exam  Vitals and nursing note reviewed.   Constitutional:       General: She is not in acute distress.     Appearance: Normal appearance. She is not ill-appearing, toxic-appearing or diaphoretic.   HENT:      Head: Atraumatic.      Right Ear: Tympanic membrane, ear canal and external ear normal.      Left Ear: Tympanic membrane, ear canal and external ear normal.      Mouth/Throat:      Mouth: Mucous membranes are moist.   Eyes:      Extraocular Movements: Extraocular movements intact.      Conjunctiva/sclera: Conjunctivae normal.   Cardiovascular:      Rate and Rhythm: Normal rate.   Pulmonary:      Effort: Pulmonary effort is normal. No respiratory distress.      Breath sounds: No stridor. Wheezing present. No rhonchi or rales.   Skin:     General: Skin is warm and dry.   Neurological:      Mental Status: She is  alert and oriented to person, place, and time.   Psychiatric:         Mood and Affect: Mood normal.         Behavior: Behavior normal.         ED Course       Results for orders placed or performed during the hospital encounter of 06/28/24 (from the past 24 hour(s))   XR Chest 2 Views    Narrative    EXAM: XR CHEST 2 VIEWS  LOCATION: [facility]  DATE: 6/28/2024    INDICATION: Cough, congestion x3d, hx asthma.  COMPARISON: 11/7/2022      Impression    IMPRESSION: No change. Benign calcified granuloma right upper lobe. Lungs otherwise clear. Heart size and pulmonary vessels normal.       Medications - No data to display    Assessments & Plan (with Medical Decision Making)     I have reviewed the nursing notes.    I have reviewed the findings, diagnosis, plan and need for follow up with the patient.  59 year old female with PMH significant for moderate persistent asthma who presents for evaluation of cough, congestion and fatigue that initially began a few days ago.  She is concerned she might have bronchitis as she has experienced this a few times in the past.  Is noticing some tightness in her chest with breathing. She has been using nebulizers as directed which are helping a little with symptoms.  Also felt chills and subjective fever this morning.  Denies any chest pain, shortness of breath, palpitations, nausea, vomiting, or diarrhea.    On exam, patient is well-appearing, afebrile with VS WNL aside from HTN.  There is some wheezing heard on auscultation without rhonchi or rales, normal effort of breathing.  Rest of exam as above.  I ordered a chest x-ray given the duration of her symptoms and to evaluate for potential pneumonia.  I independently reviewed her x-ray and agree with the radiology interpretation above.  Suspect viral bronchitis, we will treat with a 5-day course of prednisone which has helped her in the past.  Discussed other supportive cares to aid with symptoms.  Advised  that if symptoms worsen or if she develops any shortness of breath or trouble with breathing that she return for further evaluation.  All questions answered.  Patient verbalizes understanding and agreement with the above plan.    Disclaimer: This note consists of symbols derived from keyboarding, dictation, and/or voice recognition software. As a result, there may be errors in the script that have gone undetected.  Please consider this when interpreting information found in the chart.      Discharge Medication List as of 6/28/2024  5:32 PM        START taking these medications    Details   !! predniSONE (DELTASONE) 20 MG tablet Take two tablets (= 40mg) each day for 5 (five) days, Disp-10 tablet, R-0, E-Prescribe       !! - Potential duplicate medications found. Please discuss with provider.          Final diagnoses:   Viral bronchitis       6/28/2024   [facility] EMERGENCY DEPT       Doris Shelley PA-C  06/28/24 [id number]

## 2024-06-28 NOTE — ED TRIAGE NOTES
Pt presents with concern for bronchitis, pt states having a hx of bronchitis and has asthma.

## 2024-09-03 ENCOUNTER — E-VISIT (OUTPATIENT)
Dept: FAMILY MEDICINE | Facility: CLINIC | Age: 59
End: 2024-09-03
Payer: COMMERCIAL

## 2024-09-03 DIAGNOSIS — J06.9 VIRAL URI: Primary | ICD-10-CM

## 2024-09-03 DIAGNOSIS — J45.40 MODERATE PERSISTENT ASTHMA WITHOUT COMPLICATION: Primary | ICD-10-CM

## 2024-09-03 PROCEDURE — 99421 OL DIG E/M SVC 5-10 MIN: CPT | Performed by: NURSE PRACTITIONER

## 2024-09-04 RX ORDER — FLUTICASONE PROPIONATE 50 MCG
1 SPRAY, SUSPENSION (ML) NASAL DAILY
Qty: 11.1 ML | Refills: 0 | Status: SHIPPED | OUTPATIENT
Start: 2024-09-04

## 2024-09-04 RX ORDER — BENZONATATE 200 MG/1
200 CAPSULE ORAL 3 TIMES DAILY PRN
Qty: 30 CAPSULE | Refills: 0 | Status: SHIPPED | OUTPATIENT
Start: 2024-09-04

## 2024-09-04 NOTE — PATIENT INSTRUCTIONS
Acute Sinusitis: Care Instructions  Overview     Acute sinusitis is an inflammation of the mucous membranes inside the nose and sinuses. Sinuses are the hollow spaces in your skull around the eyes and nose. Acute sinusitis often follows a cold. Acute sinusitis causes thick, discolored mucus that drains from the nose or down the back of the throat. It also can cause pain and pressure in your head and face along with a stuffy or blocked nose.  In most cases, sinusitis gets better on its own in 1 to 2 weeks. But some mild symptoms may last for several weeks. Sometimes antibiotics are needed if there is a bacterial infection.  Follow-up care is a key part of your treatment and safety. Be sure to make and go to all appointments, and call your doctor if you are having problems. It's also a good idea to know your test results and keep a list of the medicines you take.  How can you care for yourself at home?  Use saline (saltwater) nasal washes. This can help keep your nasal passages open and wash out mucus and allergens.  You can buy saline nose washes at a grocery store or drugstore. Follow the instructions on the package.  You can make your own at home. Add 1 teaspoon of non-iodized salt and 1 teaspoon of baking soda to 2 cups of distilled or boiled and cooled water. Fill a squeeze bottle or a nasal cleansing pot (such as a neti pot) with the nasal wash. Then put the tip into your nostril, and lean over the sink. With your mouth open, gently squirt the liquid. Repeat on the other side.  Try a decongestant nasal spray like oxymetazoline (Afrin). Do not use it for more than 3 days in a row. Using it for more than 3 days can make your congestion worse.  If needed, take an over-the-counter pain medicine, such as acetaminophen (Tylenol), ibuprofen (Advil, Motrin), or naproxen (Aleve). Read and follow all instructions on the label.  If the doctor prescribed antibiotics, take them as directed. Do not stop taking them just  "because you feel better. You need to take the full course of antibiotics.  Be careful when taking over-the-counter cold or flu medicines and Tylenol at the same time. Many of these medicines have acetaminophen, which is Tylenol. Read the labels to make sure that you are not taking more than the recommended dose. Too much acetaminophen (Tylenol) can be harmful.  Try a steroid nasal spray. It may help with your symptoms.  Breathe warm, moist air. You can use a steamy shower, a hot bath, or a sink filled with hot water. Avoid cold, dry air. Using a humidifier in your home may help. Follow the directions for cleaning the machine.  When should you call for help?   Call your doctor now or seek immediate medical care if:    You have new or worse swelling, redness, or pain in your face or around one or both of your eyes.     You have double vision or a change in your vision.     You have a high fever.     You have a severe headache and a stiff neck.     You have mental changes, such as feeling confused or much less alert.   Watch closely for changes in your health, and be sure to contact your doctor if:    You are not getting better as expected.   Where can you learn more?  Go to https://[email or web address].com.net/patiented  Enter I933 in the search box to learn more about \"Acute Sinusitis: Care Instructions.\"  Current as of: September 27, 2023               Content Version: 14.0    1087-1094 [employer].   Care instructions adapted under license by your healthcare professional. If you have questions about a medical condition or this instruction, always ask your healthcare professional. [employer] disclaims any warranty or liability for your use of this information.      Dear Rose Mary Constantino    After reviewing your responses, I've been able to diagnose you with Viral URI.      Based on your responses and diagnosis, I have prescribed   Orders Placed This Encounter   Medications     benzonatate " (TESSALON) 200 MG capsule     Sig: Take 1 capsule (200 mg) by mouth 3 times daily as needed for cough.     Dispense:  30 capsule     Refill:  0     fluticasone (FLONASE) 50 MCG/ACT nasal spray     Sig: Spray 1 spray into both nostrils daily.     Dispense:  11.1 mL     Refill:  0    to treat your symptoms. I have sent this to your pharmacy.?     It is also important to stay well hydrated, get lots of rest and take over-the-counter decongestants,?tylenol?or ibuprofen if you?are able to?take those medications per your primary care provider to help relieve discomfort.?     It is important that you take?all of?your prescribed medication even if your symptoms are improving after a few doses.? Taking?all of?your medicine helps prevent the symptoms from returning.?     If your symptoms worsen, you develop severe headache, vomiting, high fever (>102), or are not improving in 7 days, please contact your primary care provider for an appointment or visit any of our convenient Walk-in Care or Urgent Care Centers to be seen which can be found on our website?here.?     Thanks again for choosing?us?as your health care partner,?   ?  REDD Zavaleta CNP?

## 2024-09-05 RX ORDER — BUDESONIDE AND FORMOTEROL FUMARATE DIHYDRATE 160; 4.5 UG/1; UG/1
2 AEROSOL RESPIRATORY (INHALATION)
Qty: 30.6 G | Refills: 4 | Status: SHIPPED | OUTPATIENT
Start: 2024-09-05

## 2024-10-20 ENCOUNTER — HEALTH MAINTENANCE LETTER (OUTPATIENT)
Age: 59
End: 2024-10-20

## 2024-10-29 ENCOUNTER — HOSPITAL ENCOUNTER (EMERGENCY)
Facility: CLINIC | Age: 59
Discharge: HOME | End: 2024-10-29
Attending: STUDENT IN AN ORGANIZED HEALTH CARE EDUCATION/TRAINING PROGRAM | Admitting: STUDENT IN AN ORGANIZED HEALTH CARE EDUCATION/TRAINING PROGRAM
Payer: COMMERCIAL

## 2024-10-29 VITALS
DIASTOLIC BLOOD PRESSURE: 74 MMHG | BODY MASS INDEX: 28.32 KG/M2 | HEART RATE: 82 BPM | TEMPERATURE: 98.1 F | SYSTOLIC BLOOD PRESSURE: 155 MMHG | RESPIRATION RATE: 16 BRPM | HEIGHT: 61 IN | OXYGEN SATURATION: 97 % | WEIGHT: 150 LBS
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
## 2024-10-29 DIAGNOSIS — T14.8XXA SKIN FOREIGN BODY: ICD-10-CM

## 2024-10-29 DIAGNOSIS — S20.461A TICK BITE OF RIGHT BACK WALL OF THORAX, INITIAL ENCOUNTER: ICD-10-CM

## 2024-10-29 PROCEDURE — 10120 INC&RMVL FB SUBQ TISS SMPL: CPT | Performed by: STUDENT IN AN ORGANIZED HEALTH CARE EDUCATION/TRAINING PROGRAM

## 2024-10-29 PROCEDURE — 99284 EMERGENCY DEPT VISIT MOD MDM: CPT | Mod: 25 | Performed by: STUDENT IN AN ORGANIZED HEALTH CARE EDUCATION/TRAINING PROGRAM

## 2024-10-29 PROCEDURE — 99283 EMERGENCY DEPT VISIT LOW MDM: CPT | Mod: 25 | Performed by: STUDENT IN AN ORGANIZED HEALTH CARE EDUCATION/TRAINING PROGRAM

## 2024-10-29 RX ORDER — CEPHALEXIN 500 MG/1
500 CAPSULE ORAL 4 TIMES DAILY
Qty: 20 CAPSULE | Refills: 0 | Status: ON HOLD | OUTPATIENT
Start: 2024-10-29 | End: 2024-11-04

## 2024-10-29 RX ORDER — DOXYCYCLINE 100 MG/1
200 CAPSULE ORAL ONCE
Qty: 2 CAPSULE | Refills: 0 | Status: SHIPPED | OUTPATIENT
Start: 2024-10-29 | End: 2024-10-29

## 2024-10-29 ASSESSMENT — COLUMBIA-SUICIDE SEVERITY RATING SCALE - C-SSRS
6. HAVE YOU EVER DONE ANYTHING, STARTED TO DO ANYTHING, OR PREPARED TO DO ANYTHING TO END YOUR LIFE?: NO
1. IN THE PAST MONTH, HAVE YOU WISHED YOU WERE DEAD OR WISHED YOU COULD GO TO SLEEP AND NOT WAKE UP?: NO
2. HAVE YOU ACTUALLY HAD ANY THOUGHTS OF KILLING YOURSELF IN THE PAST MONTH?: NO

## 2024-10-29 ASSESSMENT — ACTIVITIES OF DAILY LIVING (ADL): ADLS_ACUITY_SCORE: 0

## 2024-10-29 NOTE — ED PROVIDER NOTES
History     Chief Complaint   Patient presents with    Tick Bite     HPI  Rose Mary Constantino is a 59 year old female who has GERD, fatty liver, hypertension, hypothyroidism, type 2 diabetes, who presents to the ER for evaluation of a tick bite.  The patient states that she was getting ready for work this morning and she looked in the mirror and noticed a red weird spot on her back.  She had her daughter look at it and she noticed that she had an engorged tick on her back right around her bra line.  Her daughter was able to remove the tick with tweezers.  However, she is concerned that there is retained piece of the tick and she is concerned for Lyme's disease so she came in for evaluation.  She does not know how long the tick has been there.  She denies fever or bodyaches.  No rashes.  She does have the dead tick with her.    Allergies:  Allergies   Allergen Reactions    Penicillins Rash       Problem List:    Patient Active Problem List    Diagnosis Date Noted    Trigger finger of left thumb 03/01/2024     Priority: Medium    Type 2 diabetes mellitus without complication, without long-term current use of insulin (H) 06/24/2021     Priority: Medium    Acquired hypothyroidism 10/31/2017     Priority: Medium    HTN (hypertension) 03/31/2014     Priority: Medium    NAFLD (nonalcoholic fatty liver disease) 03/27/2014     Priority: Medium    Moderate persistent asthma 04/25/2012     Priority: Medium    Family history of diabetes mellitus 04/02/2012     Priority: Medium    GERD (gastroesophageal reflux disease) 04/02/2012     Priority: Medium     Zantac not effective      CARDIOVASCULAR SCREENING; LDL GOAL LESS THAN 130 03/19/2012     Priority: Medium    Family history of colon cancer 09/08/2009     Priority: Medium    Enlarged thyroid with 2mm cyst  11/01/2007     Priority: Medium     Sono shows mildly enlarged thryoid with 2mm cyst right lobe. TSH wnl. Sono from 1/2002 showed prominence of thyroid and no cyst.       Obesity 08/21/2007     Priority: Medium     Problem list name updated by automated process. Provider to review          Past Medical History:    Past Medical History:   Diagnosis Date    Abnormal Papanicolaou smear of cervix and cervical HPV 2003    Advanced directives, counseling/discussion 04/20/2018    Eczema     Human papillomavirus in conditions classified elsewhere and of unspecified site     Skin cancer        Past Surgical History:    Past Surgical History:   Procedure Laterality Date    COLONOSCOPY  12/03/2012    Procedure: COLONOSCOPY;  Colonoscopy;  Surgeon: Darnell Siddiqui MD;  Location: [facility]    COLONOSCOPY N/A 06/04/2018    Procedure: COLONOSCOPY;  colonoscopy;  Surgeon: Nakul Antonio MD;  Location: [facility]    ESOPHAGOSCOPY, GASTROSCOPY, DUODENOSCOPY (EGD), COMBINED N/A 05/05/2017    Procedure: COMBINED ESOPHAGOSCOPY, GASTROSCOPY, DUODENOSCOPY (EGD), BIOPSY SINGLE OR MULTIPLE;  Gastroscopy;  Surgeon: Yonny Zambrano MD;  Location: [facility]    NO HISTORY OF SURGERY  08/2007       Family History:    Family History   Problem Relation Age of Onset    Hypertension Mother     Diabetes Mother     Anxiety Disorder Mother     C.A.D. Maternal Grandmother     Hypertension Maternal Grandmother     Cerebrovascular Disease Maternal Grandmother     Cancer Paternal Grandfather         ? type    Diabetes Paternal Grandfather         Adult onset    Cancer Maternal Grandfather         ? renal    Diabetes Father     Hypertension Father     Cancer - colorectal Father     Coronary Artery Disease Father     Anxiety Disorder Father     Respiratory Daughter         asthma    Heart Disease Brother         double bypass    C.A.D. Brother     Cancer Brother         testicular ca    Hypertension Brother     Myocardial Infarction Brother     Breast Cancer No family hx of        Social History:  Marital Status:   [2]  Social History     Tobacco Use    Smoking status: Every Day     Current packs/day: 0.50     Average  "packs/day: 0.5 packs/day for 41.8 years (20.9 ttl pk-yrs)     Types: Cigarettes     Start date: 1/1/1983    Smokeless tobacco: Never    Tobacco comments:     5 cigarettes per day as of 6/5/2023   Vaping Use    Vaping status: Never Used   Substance Use Topics    Alcohol use: Not Currently     Comment: 1 beer per month, usually none    Drug use: No        Medications:    cephALEXin (KEFLEX) 500 MG capsule  doxycycline hyclate (VIBRAMYCIN) 100 MG capsule  albuterol (PROAIR HFA/PROVENTIL HFA/VENTOLIN HFA) 108 (90 Base) MCG/ACT inhaler  albuterol (PROVENTIL) (2.5 MG/3ML) 0.083% neb solution  atorvastatin (LIPITOR) 10 MG tablet  benzonatate (TESSALON) 200 MG capsule  budesonide-formoterol (SYMBICORT) 160-4.5 MCG/ACT Inhaler  budesonide-formoterol (SYMBICORT) 160-4.5 MCG/ACT Inhaler  budesonide-formoterol (SYMBICORT) 160-4.5 MCG/ACT Inhaler  clobetasol (TEMOVATE) 0.05 % external ointment  fluticasone (FLONASE) 50 MCG/ACT nasal spray  fluticasone (FLONASE) 50 MCG/ACT nasal spray  ipratropium - albuterol 0.5 mg/2.5 mg/3 mL (DUONEB) 0.5-2.5 (3) MG/3ML neb solution  levothyroxine (SYNTHROID/LEVOTHROID) 25 MCG tablet  lisinopril (ZESTRIL) 10 MG tablet  loratadine (CLARITIN) 10 MG tablet  metFORMIN (GLUCOPHAGE XR) 500 MG 24 hr tablet  nicotine (NICODERM CQ) 14 MG/24HR 24 hr patch  predniSONE (DELTASONE) 20 MG tablet  predniSONE (DELTASONE) 20 MG tablet          Review of Systems  See HPI  Physical Exam   BP: (!) 155/74  Pulse: 82  Temp: 98.1  F (36.7  C)  Resp: 16  Height: 154.9 cm (5' 1\")  Weight: 68 kg (150 lb)  SpO2: 97 %      Physical Exam  BP (!) 155/74   Pulse 82   Temp 98.1  F (36.7  C) (Oral)   Resp 16   Ht 1.549 m (5' 1\")   Wt 68 kg (150 lb)   LMP 04/06/2016   SpO2 97%   BMI 28.34 kg/m    General: alert, interactive, in no apparent distress  Head: atraumatic  Nose: no rhinorrhea or epistaxis  Eyes: Sclera nonicteric. Conjunctiva noninjected.   Neck: moving spontaneously   Lungs: No increased work of breathing. "   Skin: Wound noted on the right upper back right around the bra line.  Mild surrounding erythema.  There does appear to be a small black retained foreign body in the wound consistent with likely retained tick body part  Neuro: CN II-XII grossly intact, normal gait, spontaneously moving all extremities    ED [facility]    Foreign Body Removal    Date/Time: 10/29/2024 5:08 AM    Performed by: Joey Ba MD  Authorized by: Joey Ba MD    Risks, benefits and alternatives discussed.      LOCATION     Location:  Trunk    Trunk location:  Upper back    Depth:  Intradermal    Tendon involvement:  None    PRE-PROCEDURE DETAILS     Imaging:  None    Neurovascular status: intact    ANESTHESIA (see MAR for exact dosages)     Anesthesia method:  Local infiltration    Local anesthetic:  Bupivacaine 0.5% w/o epi  PROCEDURE TYPE     Procedure complexity:  Simple    PROCEDURE DETAILS     Scalpel size:  11    Incision length:  Stab    Localization method:  Visualized    Dissection of underlying tissues: no      Bloodless field: yes      Removal mechanism:  Forceps    Foreign bodies recovered:  1    Description:  Tick body part    Intact foreign body removal: yes      POST-PROCEDURE DETAILS     Neurovascular status: intact      Confirmation:  No additional foreign bodies on visualization    Skin closure:  None    Dressing:  Non-adherent dressing    Patient tolerance of procedure:  Patient tolerated the procedure well with no immediate complications      PROCEDURE    Patient Tolerance:  Patient tolerated the procedure well with no immediate complications               Critical Care time:  none             No results found for this or any previous visit (from the past 24 hours).    Medications - No data to display    Assessments & Plan (with Medical Decision Making)     I have reviewed the nursing notes.    I have reviewed the findings, diagnosis, plan and need for follow up with the  patient.          Medical Decision Making  Rose Mary Constantino is a 59 year old female who has GERD, fatty liver, hypertension, hypothyroidism, type 2 diabetes, who presents to the ER for evaluation of a tick bite.  Vital signs reviewed and notable for hypertension otherwise reassuring.  Patient is well-appearing and nontoxic.  She does not have any infectious symptoms at this time.  She does have evidence of a recent tick bite with concern for retained foreign body that is likely a piece of the tick.  This was removed as above.  See procedure note for details.  I have ordered doxycycline for Lyme prophylaxis.  I am also going to place her on Keflex to cover for any possible cellulitis given the retained foreign body.  Additional wound cares discussed.  Reassurance anticipatory guidance discussed.  Return precautions discussed.        Discharge Medication List as of 10/29/2024  5:05 AM        START taking these medications    Details   cephALEXin (KEFLEX) 500 MG capsule Take 1 capsule (500 mg) by mouth 4 times daily for 5 days., Disp-20 capsule, R-0, [facility]      doxycycline hyclate (VIBRAMYCIN) 100 MG capsule Take 2 capsules (200 mg) by mouth once for 1 dose., Disp-2 capsule, R-0, E-Prescribe             Final diagnoses:   Tick bite of right back wall of thorax, initial encounter   Skin foreign body - Retained tick head       10/29/2024   [facility] EMERGENCY DEPT       Joey Ba MD  10/29/24 0568

## 2024-10-29 NOTE — DISCHARGE INSTRUCTIONS
You had a retained part of the tick that was removed.  You have been prescribed 2 different antibiotics.  The first antibiotic is called doxycycline and is a one-time antibiotic that you can take later today to help prevent Lyme's disease.  The other antibiotic is called Keflex and is to help prevent skin infection due to the retained tick.  You should take this 4 times per day for 5 days.  Keep the wound clean.  If you develop fever, rash, or any other concerning symptoms you should be evaluated by healthcare professional.

## 2024-10-29 NOTE — ED TRIAGE NOTES
Red spot on the right upper back along her bra line. There is redness and tried to remove a tick from the redness she noticed this AM when getting into the shower to go to work.

## 2024-10-30 ENCOUNTER — TELEPHONE (OUTPATIENT)
Dept: FAMILY MEDICINE | Facility: CLINIC | Age: 59
End: 2024-10-30
Payer: COMMERCIAL

## 2024-10-30 NOTE — LETTER
October 30, 2024    To  Rose Mary Constantino  [address] [facility] [address]    Your team at [facility] cares about your health. We have reviewed your chart and based on our findings; we are making the following recommendations to better manage your health.     You are in particular need of attention regarding the following:     Schedule Annual MAMMOGRAPHY. [facility] scheduling number is [phone] or schedule in [facility] (self referral).  1 in 8 women will develop invasive breast cancer during her lifetime and it is the most common non-skin cancer in [place]. EARLY detection, new treatments, and a better understanding of the disease have increased survival rates- the 5 year survival rate in the 1960's was 63% and today it is close to 90%.    If you have already completed these items, please contact the clinic via phone or   Adveb[facility]art so your care team can review and update your records. Thank you for   choosing [facility] for your healthcare needs. For any questions,   concerns, or to schedule an appointment please contact our clinic.    Healthy Regards,      Your [facility] Care Team

## 2024-10-30 NOTE — TELEPHONE ENCOUNTER
Patient Quality Outreach    Patient is due for the following:   Colon Cancer Screening  Breast Cancer Screening - Mammogram      Topic Date Due    Zoster (Shingles) Vaccine (1 of 2) Never done    Flu Vaccine (1) 09/01/2024    COVID-19 Vaccine (4 - 2024-25 season) 09/01/2024       Next Steps:   Patient needs to schedule lmammogram  Has appointment for colonoscopy on 11/4/24    Type of outreach:    Sent letter.      Questions for provider review:    None           Tran Ambrose, CMA

## 2024-11-01 ENCOUNTER — ANESTHESIA EVENT (OUTPATIENT)
Dept: GASTROENTEROLOGY | Facility: CLINIC | Age: 59
End: 2024-11-01
Payer: COMMERCIAL

## 2024-11-01 RX ORDER — ONDANSETRON 4 MG/1
4 TABLET, ORALLY DISINTEGRATING ORAL EVERY 30 MIN PRN
Status: CANCELLED | OUTPATIENT
Start: 2024-11-01

## 2024-11-01 RX ORDER — ONDANSETRON 2 MG/ML
4 INJECTION INTRAMUSCULAR; INTRAVENOUS EVERY 30 MIN PRN
Status: CANCELLED | OUTPATIENT
Start: 2024-11-01

## 2024-11-01 RX ORDER — NALOXONE HYDROCHLORIDE 0.4 MG/ML
0.1 INJECTION, SOLUTION INTRAMUSCULAR; INTRAVENOUS; SUBCUTANEOUS
Status: CANCELLED | OUTPATIENT
Start: 2024-11-01

## 2024-11-01 RX ORDER — DEXAMETHASONE SODIUM PHOSPHATE 4 MG/ML
4 INJECTION, SOLUTION INTRA-ARTICULAR; INTRALESIONAL; INTRAMUSCULAR; INTRAVENOUS; SOFT TISSUE
Status: CANCELLED | OUTPATIENT
Start: 2024-11-01

## 2024-11-01 ASSESSMENT — LIFESTYLE VARIABLES: TOBACCO_USE: 1

## 2024-11-01 NOTE — ANESTHESIA PREPROCEDURE EVALUATION
Anesthesia Pre-Procedure Evaluation    Patient: Rose Mary Constantino   MRN: [id number] : 1965        Procedure : Procedure(s):  Colonoscopy          Past Medical History:   Diagnosis Date     Abnormal Papanicolaou smear of cervix and cervical HPV     Cryo  (path results?)     Advanced directives, counseling/discussion 2018    Advance Care Planning 2018: ACP Review of Chart / Resources Provided:  Reviewed chart for advance care plan.  Rose Mary Rose has been provided information and resources to begin or update their advance care plan.  Added by Rachelle Buchanan        Eczema      Human papillomavirus in conditions classified elsewhere and of unspecified site     Genital warts     Skin cancer       Past Surgical History:   Procedure Laterality Date     COLONOSCOPY  2012    Procedure: COLONOSCOPY;  Colonoscopy;  Surgeon: Danrell Siddiqui MD;  Location: [facility]     COLONOSCOPY N/A 2018    Procedure: COLONOSCOPY;  colonoscopy;  Surgeon: Nakul Antonio MD;  Location: [facility]     ESOPHAGOSCOPY, GASTROSCOPY, DUODENOSCOPY (EGD), COMBINED N/A 2017    Procedure: COMBINED ESOPHAGOSCOPY, GASTROSCOPY, DUODENOSCOPY (EGD), BIOPSY SINGLE OR MULTIPLE;  Gastroscopy;  Surgeon: Yonny Zambrano MD;  Location: [facility]     NO HISTORY OF SURGERY  2007      Allergies   Allergen Reactions     Penicillins Rash      Social History     Tobacco Use     Smoking status: Every Day     Current packs/day: 0.50     Average packs/day: 0.5 packs/day for 41.8 years (20.9 ttl pk-yrs)     Types: Cigarettes     Start date: 1983     Smokeless tobacco: Never     Tobacco comments:     5 cigarettes per day as of 2023   Substance Use Topics     Alcohol use: Not Currently     Comment: 1 beer per month, usually none      Wt Readings from Last 1 Encounters:   10/29/24 68 kg (150 lb)        Anesthesia Evaluation   Pt has had prior anesthetic. Type: MAC.    No history of anesthetic complications       ROS/MED  HX  ENT/Pulmonary:     (+)     JUNIOR risk factors,  hypertension,    allergic rhinitis,     tobacco use, Current use,  21  Pack-Year Hx,   Moderate Persistent, asthma  Treatment: Inhaler prn, Inhaler daily, Nebulizer prn and Inhaled steroids,                 Neurologic:  - neg neurologic ROS     Cardiovascular:     (+)  hypertension- -   -  - -                                      METS/Exercise Tolerance:     Hematologic:  - neg hematologic  ROS     Musculoskeletal:  - neg musculoskeletal ROS     GI/Hepatic:     (+) GERD,       bowel prep,     liver disease,       Renal/Genitourinary:  - neg Renal ROS     Endo: Comment: overweight    (+)  type II DM, Last HgA1c: 7.2, date: 06/2024, Not using insulin, - not using insulin pump.    thyroid problem, hypothyroidism,    Obesity,       Psychiatric/Substance Use:  - neg psychiatric ROS     Infectious Disease:  - neg infectious disease ROS     Malignancy:   (+) Malignancy, History of Skin.Skin CA Remission status post Surgery.      Other:  - neg other ROS          Physical Exam    Airway  airway exam normal           Respiratory Devices and Support         Dental       (+) Modest Abnormalities - crowns, retainers, 1 or 2 missing teeth      Cardiovascular   cardiovascular exam normal          Pulmonary   pulmonary exam normal            OUTSIDE LABS:  CBC:   Lab Results   Component Value Date    WBC 5.3 07/17/2019    WBC 9.6 01/24/2019    HGB 14.6 01/24/2019    HGB 14.3 09/18/2017    HCT 44.8 01/24/2019    HCT 44.4 09/18/2017     01/24/2019     09/18/2017     BMP:   Lab Results   Component Value Date     06/07/2024     06/05/2023    POTASSIUM 5.0 06/07/2024    POTASSIUM 4.1 06/05/2023    CHLORIDE 103 06/07/2024    CHLORIDE 106 06/05/2023    CO2 25 06/07/2024    CO2 25 06/05/2023    BUN 11.2 06/07/2024    BUN 10.7 06/05/2023    CR 0.69 06/07/2024    CR 0.62 06/05/2023     (H) 06/07/2024     (H) 06/05/2023     COAGS:   Lab Results  "  Component Value Date    INR 0.94 12/31/2012     POC:   Lab Results   Component Value Date    HCG Negative 12/03/2012     HEPATIC:   Lab Results   Component Value Date    ALBUMIN 3.8 09/18/2017    PROTTOTAL 7.6 09/18/2017    ALT 62 (H) 09/18/2017    AST 35 09/18/2017     (H) 04/20/2013    ALKPHOS 227 (H) 09/18/2017    BILITOTAL 0.8 09/18/2017     OTHER:   Lab Results   Component Value Date    A1C 7.2 (H) 06/07/2024    REYNA 9.9 06/07/2024    PHOS 3.4 09/18/2017    TSH 1.91 06/07/2024    T4 1.00 12/09/2017    CRP 7.7 07/17/2019       Anesthesia Plan    ASA Status:  3    NPO Status:  NPO Appropriate    Anesthesia Type: General.     - Airway: Native airway      Maintenance: Balanced.        Consents    Anesthesia Plan(s) and associated risks, benefits, and realistic alternatives discussed. Questions answered and patient/representative(s) expressed understanding.     - Discussed: Risks, Benefits and Alternatives for BOTH SEDATION and the PROCEDURE were discussed     - Discussed with:  Patient            Postoperative Care       PONV prophylaxis: Background Propofol Infusion     Comments:    Other Comments: 2 albuterol puffs at 0500 per patient-easy breathing         REDD Menjivar CRNA    I have reviewed the pertinent notes and labs in the chart from the past 30 days and (re)examined the patient.  Any updates or changes from those notes are reflected in this note.               # Hypertension: Noted on problem list        # DMII: A1C = N/A within past 6 months    # Overweight: Estimated body mass index is 28.34 kg/m  as calculated from the following:    Height as of 10/29/24: 1.549 m (5' 1\").    Weight as of 10/29/24: 68 kg (150 lb).       # Asthma: noted on problem list       "

## 2024-11-04 ENCOUNTER — ANESTHESIA (OUTPATIENT)
Dept: GASTROENTEROLOGY | Facility: CLINIC | Age: 59
End: 2024-11-04
Payer: COMMERCIAL

## 2024-11-04 ENCOUNTER — HOSPITAL ENCOUNTER (OUTPATIENT)
Facility: CLINIC | Age: 59
Discharge: HOME | End: 2024-11-04
Attending: SURGERY | Admitting: SURGERY
Payer: COMMERCIAL

## 2024-11-04 VITALS
RESPIRATION RATE: 18 BRPM | DIASTOLIC BLOOD PRESSURE: 57 MMHG | HEART RATE: 60 BPM | TEMPERATURE: 97.8 F | SYSTOLIC BLOOD PRESSURE: 121 MMHG | BODY MASS INDEX: 28.32 KG/M2 | HEIGHT: 61 IN | OXYGEN SATURATION: 97 % | WEIGHT: 150 LBS

## 2024-11-04 LAB — COLONOSCOPY: NORMAL

## 2024-11-04 PROCEDURE — 250N000009 HC RX 250: Performed by: NURSE ANESTHETIST, CERTIFIED REGISTERED

## 2024-11-04 PROCEDURE — 370N000017 HC ANESTHESIA TECHNICAL FEE, PER MIN: Performed by: SURGERY

## 2024-11-04 PROCEDURE — 45380 COLONOSCOPY AND BIOPSY: CPT | Performed by: SURGERY

## 2024-11-04 PROCEDURE — 45385 COLONOSCOPY W/LESION REMOVAL: CPT | Mod: PT

## 2024-11-04 PROCEDURE — 88305 TISSUE EXAM BY PATHOLOGIST: CPT | Mod: TC | Performed by: SURGERY

## 2024-11-04 PROCEDURE — 258N000003 HC RX IP 258 OP 636: Performed by: NURSE ANESTHETIST, CERTIFIED REGISTERED

## 2024-11-04 PROCEDURE — 88305 TISSUE EXAM BY PATHOLOGIST: CPT | Mod: 26 | Performed by: PATHOLOGY

## 2024-11-04 PROCEDURE — 250N000011 HC RX IP 250 OP 636: Performed by: NURSE ANESTHETIST, CERTIFIED REGISTERED

## 2024-11-04 RX ORDER — GLYCOPYRROLATE 0.2 MG/ML
INJECTION, SOLUTION INTRAMUSCULAR; INTRAVENOUS PRN
Status: DISCONTINUED | OUTPATIENT
Start: 2024-11-04 | End: 2024-11-04

## 2024-11-04 RX ORDER — PROPOFOL 10 MG/ML
INJECTION, EMULSION INTRAVENOUS PRN
Status: DISCONTINUED | OUTPATIENT
Start: 2024-11-04 | End: 2024-11-04

## 2024-11-04 RX ORDER — LIDOCAINE HYDROCHLORIDE 20 MG/ML
INJECTION, SOLUTION INFILTRATION; PERINEURAL PRN
Status: DISCONTINUED | OUTPATIENT
Start: 2024-11-04 | End: 2024-11-04

## 2024-11-04 RX ORDER — SODIUM CHLORIDE, SODIUM LACTATE, POTASSIUM CHLORIDE, CALCIUM CHLORIDE 600; 310; 30; 20 MG/100ML; MG/100ML; MG/100ML; MG/100ML
INJECTION, SOLUTION INTRAVENOUS CONTINUOUS PRN
Status: DISCONTINUED | OUTPATIENT
Start: 2024-11-04 | End: 2024-11-04

## 2024-11-04 RX ORDER — SODIUM CHLORIDE 9 MG/ML
INJECTION, SOLUTION INTRAVENOUS CONTINUOUS
Status: DISCONTINUED | OUTPATIENT
Start: 2024-11-04 | End: 2024-11-04 | Stop reason: HOSPADM

## 2024-11-04 RX ORDER — LIDOCAINE 40 MG/G
CREAM TOPICAL
Status: DISCONTINUED | OUTPATIENT
Start: 2024-11-04 | End: 2024-11-04 | Stop reason: HOSPADM

## 2024-11-04 RX ADMIN — PROPOFOL 40 MG: 10 INJECTION, EMULSION INTRAVENOUS at 07:48

## 2024-11-04 RX ADMIN — GLYCOPYRROLATE 0.1 MG: 0.2 INJECTION, SOLUTION INTRAMUSCULAR; INTRAVENOUS at 07:31

## 2024-11-04 RX ADMIN — GLYCOPYRROLATE 0.1 MG: 0.2 INJECTION, SOLUTION INTRAMUSCULAR; INTRAVENOUS at 07:27

## 2024-11-04 RX ADMIN — PROPOFOL 20 MG: 10 INJECTION, EMULSION INTRAVENOUS at 07:51

## 2024-11-04 RX ADMIN — PROPOFOL 200 MCG/KG/MIN: 10 INJECTION, EMULSION INTRAVENOUS at 07:31

## 2024-11-04 RX ADMIN — LIDOCAINE HYDROCHLORIDE 2 ML: 20 INJECTION, SOLUTION INFILTRATION; PERINEURAL at 07:31

## 2024-11-04 RX ADMIN — SODIUM CHLORIDE, POTASSIUM CHLORIDE, SODIUM LACTATE AND CALCIUM CHLORIDE: 600; 310; 30; 20 INJECTION, SOLUTION INTRAVENOUS at 07:31

## 2024-11-04 ASSESSMENT — ACTIVITIES OF DAILY LIVING (ADL)
ADLS_ACUITY_SCORE: 0

## 2024-11-04 NOTE — ANESTHESIA CARE TRANSFER NOTE
Patient: Rose Mary Constantino    Procedure: Procedure(s):  COLONOSCOPY, WITH POLYPECTOMY AND BIOPSY       Diagnosis: Screen for colon cancer [Z12.11]  Diagnosis Additional Information: No value filed.    Anesthesia Type:   General     Note:    Oropharynx: oropharynx clear of all foreign objects and spontaneously breathing  Level of Consciousness: awake  Oxygen Supplementation: room air    Independent Airway: airway patency satisfactory and stable  Dentition: dentition unchanged  Vital Signs Stable: post-procedure vital signs reviewed and stable  Report to RN Given: handoff report given  Patient transferred to: Phase II    Handoff Report: Identifed the Patient, Identified the Reponsible Provider, Reviewed the pertinent medical history, Discussed the surgical course, Reviewed Intra-OP anesthesia mangement and issues during anesthesia, Set expectations for post-procedure period and Allowed opportunity for questions and acknowledgement of understanding  Vitals:  Vitals Value Taken Time   BP     Temp     Pulse     Resp     SpO2         Electronically Signed By: REDD Quigley CRNA  November 4, 2024  7:58 AM

## 2024-11-04 NOTE — ANESTHESIA POSTPROCEDURE EVALUATION
Patient: Rose Mary Constantino    Procedure: Procedure(s):  COLONOSCOPY, WITH POLYPECTOMY AND BIOPSY       Anesthesia Type:  General    Note:  Disposition: Outpatient   Postop Pain Control: Uneventful            Sign Out: Well controlled pain   PONV: No   Neuro/Psych: Uneventful            Sign Out: Acceptable/Baseline neuro status   Airway/Respiratory: Uneventful            Sign Out: Acceptable/Baseline resp. status   CV/Hemodynamics: Uneventful            Sign Out: Acceptable CV status; No obvious hypovolemia; No obvious fluid overload   Other NRE: NONE   DID A NON-ROUTINE EVENT OCCUR? No       Last vitals:  Vitals Value Taken Time   /60 11/04/24 0802   Temp 36.6  C (97.8  F) 11/04/24 0802   Pulse 62 11/04/24 0802   Resp 18 11/04/24 0802   SpO2 97 % 11/04/24 0815   Vitals shown include unfiled device data.    Electronically Signed By: REDD Quigley CRNA  November 4, 2024  8:16 AM

## 2024-11-04 NOTE — DISCHARGE INSTRUCTIONS
"Learning About Colonoscopy  What is a colonoscopy?     A colonoscopy is a test (also called a procedure) that lets a doctor look inside your large intestine. The doctor uses a thin, lighted tube called a colonoscope. The doctor uses it to look for small growths called polyps, colon or rectal cancer (colorectal cancer), or other problems like bleeding.  During the procedure, the doctor can take samples of tissue. The samples can then be checked for cancer or other conditions. The doctor can also take out polyps.  How is a colonoscopy done?  This procedure is done in a doctor's office or a clinic or hospital. You will get medicine to help you relax and not feel pain. Some people find that they don't remember having the test because of the medicine.  The doctor gently moves the colonoscope, or scope, through the colon. The scope is also a small video camera. It lets the doctor see the colon and take pictures.  How do you prepare for the procedure?  You need to clean out your colon before the procedure so the doctor can see your colon. This depends on which \"colon prep\" your doctor recommends.  To clean out your colon, you'll do a \"colon prep\" before the test. This means you stop eating solid foods and drink only clear liquids. You can have water, tea, coffee, clear juices, clear broths, flavored ice pops, and gelatin (such as Jell-O). Do not drink anything red or purple.  The day or night before the procedure, you drink a large amount of a special liquid. This causes loose, frequent stools. You will go to the bathroom a lot. Your doctor may have you drink part of the liquid the evening before and the rest on the day of the test. It's very important to drink all of the liquid. If you have problems drinking it, call your doctor.  Arrange to have someone take you home after the test.  What can you expect after a colonoscopy?  Your doctor will tell you when you can eat and do your usual activities.  Drink a lot of fluid " "after the test to replace the fluids you may have lost during the colon prep. But don't drink alcohol.  Your doctor will talk to you about when you'll need your next colonoscopy. The results of your test and your risk for colorectal cancer will help your doctor decide how often you need to be checked.  After the test, you may be bloated or have gas pains. You may need to pass gas. If a biopsy was done or a polyp was removed, you may have streaks of blood in your stool (feces) for a few days. Check with your doctor to see when it is safe to take aspirin and nonsteroidal anti-inflammatory drugs (NSAIDs) again.  Problems such as heavy rectal bleeding may not occur until several weeks after the test. This isn't common. But it can happen after polyps are removed.  Follow-up care is a key part of your treatment and safety. Be sure to make and go to all appointments, and call your doctor if you are having problems. It's also a good idea to know your test results and keep a list of the medicines you take.  Where can you learn more?  Go to https://www.[employer].net/patiented  Enter Z368 in the search box to learn more about \"Learning About Colonoscopy.\"  Current as of: October 25, 2023  Content Version: 14.2 2024 [facility] [employer].   Care instructions adapted under license by your healthcare professional. If you have questions about a medical condition or this instruction, always ask your healthcare professional. [employer] disclaims any warranty or liability for your use of this information.    "

## 2024-11-04 NOTE — H&P
General Surgery H&P  Rose Mary Constantino MRN# [id number]   Age/Sex: 59 year old female YOB: 1965     Reason for visit: Colonoscopy       Referring physician: Merna RODRIGUEZ                    Assessment and Plan:   Assessment:  Colonoscopy  Family hx of colon cancer - father  Diverticulosis     Plan:  -To the OR for colonoscopy  -Risk and benefits of procedure explained detail to the patient.  Risks include infection, bleeding, damage to the surrounding structures and possible perforation of the hollow organs.  Patient verbalized understanding provided consent to undergo the procedure above.          Chief Complaint:   Presenting for colonoscopy     History is obtained from the patient    HPI:   Rose Mary Constantino is a 59 year old female who presents for colonoscopy.  Patient tolerated the prep well.  The patient's last colonoscopy was 2018.  Family history shows  history of colon cancer.  No new complaints.           Past Medical History:     Past Medical History:   Diagnosis Date    Abnormal Papanicolaou smear of cervix and cervical HPV 2003    Cryo  (path results?)    Advanced directives, counseling/discussion 04/20/2018    Advance Care Planning 4/20/2018: ACP Review of Chart / Resources Provided:  Reviewed chart for advance care plan.  Rose Mary Rose has been provided information and resources to begin or update their advance care plan.  Added by Rachelle Buchanan       Eczema     Human papillomavirus in conditions classified elsewhere and of unspecified site     Genital warts    Skin cancer               Past Surgical History:     Past Surgical History:   Procedure Laterality Date    COLONOSCOPY  12/03/2012    Procedure: COLONOSCOPY;  Colonoscopy;  Surgeon: Darnell Siddiqui MD;  Location: [facility]    COLONOSCOPY N/A 06/04/2018    Procedure: COLONOSCOPY;  colonoscopy;  Surgeon: Nakul Antonio MD;  Location: [facility]    ESOPHAGOSCOPY, GASTROSCOPY, DUODENOSCOPY (EGD), COMBINED N/A 05/05/2017     Procedure: COMBINED ESOPHAGOSCOPY, GASTROSCOPY, DUODENOSCOPY (EGD), BIOPSY SINGLE OR MULTIPLE;  Gastroscopy;  Surgeon: Yonny Zambrano MD;  Location: [facility]    NO HISTORY OF SURGERY  08/2007             Social History:    reports that she has been smoking cigarettes. She started smoking about 41 years ago. She has a 20.9 pack-year smoking history. She has never used smokeless tobacco. She reports that she does not currently use alcohol. She reports that she does not use drugs.           Family History:     Family History   Problem Relation Age of Onset    Hypertension Mother     Diabetes Mother     Anxiety Disorder Mother     C.A.D. Maternal Grandmother     Hypertension Maternal Grandmother     Cerebrovascular Disease Maternal Grandmother     Cancer Paternal Grandfather         ? type    Diabetes Paternal Grandfather         Adult onset    Cancer Maternal Grandfather         ? renal    Diabetes Father     Hypertension Father     Cancer - colorectal Father     Coronary Artery Disease Father     Anxiety Disorder Father     Respiratory Daughter         asthma    Heart Disease Brother         double bypass    C.A.D. Brother     Cancer Brother         testicular ca    Hypertension Brother     Myocardial Infarction Brother     Breast Cancer No family hx of               Allergies:     Allergies   Allergen Reactions    Penicillins Rash              Medications:     Prior to Admission medications    Medication Sig Start Date End Date Taking? Authorizing Provider   albuterol (PROAIR HFA/PROVENTIL HFA/VENTOLIN HFA) 108 (90 Base) MCG/ACT inhaler USE 2 INHALATIONS EVERY SIX HOURS AS NEEDED FOR SHORTNESS OF BREATH / DYSPNEA OR WHEEZING 6/7/24  Yes Soledad Bauman APRN CNP   atorvastatin (LIPITOR) 10 MG tablet Take 1 tablet (10 mg) by mouth daily 6/7/24  Yes Soledad Bauman APRN CNP   benzonatate (TESSALON) 200 MG capsule Take 1 capsule (200 mg) by mouth 3 times daily as needed for cough. 9/4/24  Yes Haleigh August,  APRN CNP   budesonide-formoterol (SYMBICORT) 160-4.5 MCG/ACT Inhaler USE 2 INHALATIONS TWICE A DAY 6/7/24  Yes Soledad Bauman APRN CNP   fluticasone (FLONASE) 50 MCG/ACT nasal spray USE 1 SPRAY NASALLY DAILY 6/7/24  Yes Soledad Bauman APRN CNP   levothyroxine (SYNTHROID/LEVOTHROID) 25 MCG tablet Take 1 tablet (25 mcg) by mouth daily 6/7/24  Yes Soledad Bauman APRN CNP   lisinopril (ZESTRIL) 10 MG tablet Take 1 tablet (10 mg) by mouth daily 6/7/24  Yes Soledad Bauman APRN CNP   loratadine (CLARITIN) 10 MG tablet Take 1 tablet (10 mg) by mouth daily 6/7/24  Yes Soledad Bauman APRN CNP   metFORMIN (GLUCOPHAGE XR) 500 MG 24 hr tablet Take 1 tablet (500 mg) by mouth 2 times daily (with meals) 6/7/24  Yes Soledad Bauman APRN CNP   nicotine (NICODERM CQ) 14 MG/24HR 24 hr patch Place 1 patch onto the skin every 24 hours 6/7/24  Yes Soledad Bauman APRN CNP   albuterol (PROVENTIL) (2.5 MG/3ML) 0.083% neb solution USE 1 VIAL VIA NEBULIZER EVERY FOUR HOURS AS NEEDED FOR SHORTNESS OF BREATH / DYSPNEA 6/7/24   Soledad Bauman APRN CNP   budesonide-formoterol (SYMBICORT) 160-4.5 MCG/ACT Inhaler Inhale 2 puffs into the lungs two times daily. 9/5/24   Soledad Bauman APRN CNP   budesonide-formoterol (SYMBICORT) 160-4.5 MCG/ACT Inhaler Inhale 2 puffs into the lungs 2 times daily for 30 days 6/13/24 7/13/24  Soledad Bauman APRN CNP   clobetasol (TEMOVATE) 0.05 % external ointment Apply 1 g to hands and feet BID x 2-3 weeks then PRN 6/7/24   Soledad Bauman APRN CNP   fluticasone (FLONASE) 50 MCG/ACT nasal spray Spray 1 spray into both nostrils daily. 9/4/24   Haleigh August APRN CNP   ipratropium - albuterol 0.5 mg/2.5 mg/3 mL (DUONEB) 0.5-2.5 (3) MG/3ML neb solution Take 1 vial (3 mLs) by nebulization every 6 hours as needed for shortness of breath or wheezing USE 1 VIAL VIA NEBULIZER EVERY SIX HOURS AS NEEDED FOR SHORTNESS OF BREATH/ DYSPNEA OR WHEEZING 6/7/24   Soledad Bauman,  "APRN CNP              Review of Systems:   A 12 point Review of Systems is negative other than noted in the HPI            Physical Exam:   Patient Vitals for the past 24 hrs:   BP Temp Temp src Pulse Resp SpO2 Height Weight   11/04/24 0622 131/66 98.7  F (37.1  C) Oral 74 16 97 % 1.549 m (5' 1\") 68 kg (150 lb)        No intake or output data in the 24 hours ending 11/04/24 0715   Constitutional:   awake, alert, cooperative, no apparent distress, and appears stated age       Eyes:   PERRL, conjunctiva/corneas clear, EOM's intact; no scleral edema or icterus noted        ENT:   Normocephalic, without obvious abnormality, atraumatic, Lips, mucosa, and tongue normal        Hematologic / Lymphatic:   No lymphadenopathy       Lungs:   Normal respiratory effort, no accessory muscle use, breath sounds bilaterally on auscultation       Cardiovascular:   Regular rate and rhythm       Abdomen:   Soft, nondistended, nontender to palpation       Musculoskeletal:   No obvious swelling, bruising or deformity       Skin:   Skin color and texture normal for patient, no rashes or lesions              Data:          DO Rudy Keene DO  General Surgeon  [facility]  Surgery [address]  [address] [place] [id number]?  Office: [phone]  Employed by - [employer]  Pager: [phone]         "

## 2024-11-05 LAB
PATH REPORT.COMMENTS IMP SPEC: NORMAL
PATH REPORT.COMMENTS IMP SPEC: NORMAL
PATH REPORT.FINAL DX SPEC: NORMAL
PATH REPORT.GROSS SPEC: NORMAL
PATH REPORT.MICROSCOPIC SPEC OTHER STN: NORMAL
PATH REPORT.RELEVANT HX SPEC: NORMAL
PHOTO IMAGE: NORMAL

## 2024-11-08 NOTE — RESULT ENCOUNTER NOTE
Called patient with results. Your colonoscopy results showed polyp(s).  Recommendation is to repeat colonoscopy in 5 years due to family hx of colon cancer.         Rudy Stockton DO  General Surgeon  [facility]  Surgery [facility] - [address] [address]?  Office: [phone]  Employed by - [employer]  Pager: [phone]

## 2024-11-18 ENCOUNTER — PATIENT OUTREACH (OUTPATIENT)
Dept: GASTROENTEROLOGY | Facility: CLINIC | Age: 59
End: 2024-11-18
Payer: COMMERCIAL

## 2024-12-02 ENCOUNTER — TELEPHONE (OUTPATIENT)
Dept: FAMILY MEDICINE | Facility: CLINIC | Age: 59
End: 2024-12-02
Payer: COMMERCIAL

## 2024-12-02 DIAGNOSIS — E11.9 TYPE 2 DIABETES MELLITUS WITHOUT COMPLICATION, WITHOUT LONG-TERM CURRENT USE OF INSULIN (H): Primary | ICD-10-CM

## 2024-12-02 NOTE — TELEPHONE ENCOUNTER
Pt called back. She will schedule online when she is off work.    Sommer Ca on 12/2/2024 at 1:55 PM

## 2024-12-02 NOTE — TELEPHONE ENCOUNTER
Patient is due for a diabetes follow up appointment with me.      Non-fasting labs due:  A1C and Microalbumin    Orders were placed, please have lab work done before visit.      Please ask patient to bring in their glucometer so we can download the data.    Please call patient to schedule.  Soledad Bauman, CNP

## 2024-12-02 NOTE — TELEPHONE ENCOUNTER
Patient Quality Outreach    Patient is due for the following:   Diabetes -  A1C, Microalbumin, and Diabetic Follow-Up Visit    Action(s) Taken:   Schedule a lab only visit for labs  office visit for diabetes    Type of outreach:    Phone, left message for patient/parent to call back.    Questions for provider review:    None           Vlad Clarke, CMA

## 2024-12-07 ENCOUNTER — APPOINTMENT (OUTPATIENT)
Dept: GENERAL RADIOLOGY | Facility: CLINIC | Age: 59
End: 2024-12-07
Payer: COMMERCIAL

## 2024-12-07 ENCOUNTER — HOSPITAL ENCOUNTER (EMERGENCY)
Facility: CLINIC | Age: 59
Discharge: HOME | End: 2024-12-07
Payer: COMMERCIAL

## 2024-12-07 VITALS
HEART RATE: 80 BPM | DIASTOLIC BLOOD PRESSURE: 57 MMHG | TEMPERATURE: 97.5 F | SYSTOLIC BLOOD PRESSURE: 160 MMHG | RESPIRATION RATE: 16 BRPM | OXYGEN SATURATION: 97 %

## 2024-12-07 DIAGNOSIS — M25.511 ACUTE PAIN OF RIGHT SHOULDER: ICD-10-CM

## 2024-12-07 PROCEDURE — G0463 HOSPITAL OUTPT CLINIC VISIT: HCPCS

## 2024-12-07 PROCEDURE — 73030 X-RAY EXAM OF SHOULDER: CPT | Mod: RT

## 2024-12-07 PROCEDURE — 99213 OFFICE O/P EST LOW 20 MIN: CPT

## 2024-12-07 ASSESSMENT — COLUMBIA-SUICIDE SEVERITY RATING SCALE - C-SSRS
1. IN THE PAST MONTH, HAVE YOU WISHED YOU WERE DEAD OR WISHED YOU COULD GO TO SLEEP AND NOT WAKE UP?: NO
2. HAVE YOU ACTUALLY HAD ANY THOUGHTS OF KILLING YOURSELF IN THE PAST MONTH?: NO
6. HAVE YOU EVER DONE ANYTHING, STARTED TO DO ANYTHING, OR PREPARED TO DO ANYTHING TO END YOUR LIFE?: NO

## 2024-12-07 ASSESSMENT — ACTIVITIES OF DAILY LIVING (ADL): ADLS_ACUITY_SCORE: 41

## 2024-12-07 NOTE — DISCHARGE INSTRUCTIONS
You can take Tylenol 500-1000 mg every 6 hours as needed for pain.  Do not take more than 4000 mg in a 24-hour period.  You can also apply Voltaren gel directly over the shoulder up to 4 times a day.  Continue using the heating pad as needed.  I would avoid heavy lifting or prolonged use of the arm above your head.    I recommend scheduling follow-up appointments with physical therapy and your primary care provider.  You can also schedule follow-up appointments with chiropractic care.  If your pain is not getting any better despite these treatments, your primary care doctor may need to order further testing or refer you to orthopedics.    Return to the ER if the pain gets significantly worse and you cannot move your arm, or if you develop fever, chest pain, difficulty breathing, swelling or warmth of the joint, or if other concerning symptoms develop.

## 2024-12-07 NOTE — ED PROVIDER NOTES
History     Chief Complaint   Patient presents with    Arm Pain     RT arm pain        Rose Mary Constantino is a 59 year old female with significant pmhx of GERD, NAFLD, hypertension, hypothyroidism, type 2 diabetes who presents for evaluation of right arm pain.  Patient states she developed atraumatic right shoulder/upper arm pain on Wednesday.  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  She cannot recall falling or injuring her arm in any way.  She does not have associated neck pain or radiating numbness or tingling.  The pain is worse when she tries to lift her arm over her head or complete activities such as putting on her bra.  It is better with Tylenol and a heating pad.  She denies associated fever, chest pain, difficulty breathing, nausea/vomiting, body aches.    Allergies:  Allergies   Allergen Reactions    Penicillins Rash       Problem List:    Patient Active Problem List    Diagnosis Date Noted    Trigger finger of left thumb 03/01/2024     Priority: Medium    Type 2 diabetes mellitus without complication, without long-term current use of insulin (H) 06/24/2021     Priority: Medium    Acquired hypothyroidism 10/31/2017     Priority: Medium    HTN (hypertension) 03/31/2014     Priority: Medium    NAFLD (nonalcoholic fatty liver disease) 03/27/2014     Priority: Medium    Moderate persistent asthma 04/25/2012     Priority: Medium    Family history of diabetes mellitus 04/02/2012     Priority: Medium    GERD (gastroesophageal reflux disease) 04/02/2012     Priority: Medium     Zantac not effective      CARDIOVASCULAR SCREENING; LDL GOAL LESS THAN 130 03/19/2012     Priority: Medium    Family history of colon cancer 09/08/2009     Priority: Medium    Enlarged thyroid with 2mm cyst  11/01/2007     Priority: Medium     Sono shows mildly enlarged thryoid with 2mm cyst right lobe. TSH wnl. Sono from 1/2002 showed prominence of thyroid and no cyst.      Obesity  08/21/2007     Priority: Medium     Problem list name updated by automated process. Provider to review          Past Medical History:    Past Medical History:   Diagnosis Date    Abnormal Papanicolaou smear of cervix and cervical HPV 2003    Advanced directives, counseling/discussion 04/20/2018    Eczema     Human papillomavirus in conditions classified elsewhere and of unspecified site     Skin cancer        Past Surgical History:    Past Surgical History:   Procedure Laterality Date    COLONOSCOPY  12/03/2012    Procedure: COLONOSCOPY;  Colonoscopy;  Surgeon: Darnell Siddiqui MD;  Location: [facility]    COLONOSCOPY N/A 06/04/2018    Procedure: COLONOSCOPY;  colonoscopy;  Surgeon: Nakul Antonio MD;  Location: [facility]    COLONOSCOPY N/A 11/4/2024    Procedure: COLONOSCOPY, WITH POLYPECTOMY AND BIOPSY;  Surgeon: Rudy Stockton DO;  Location: [facility]    ESOPHAGOSCOPY, GASTROSCOPY, DUODENOSCOPY (EGD), COMBINED N/A 05/05/2017    Procedure: COMBINED ESOPHAGOSCOPY, GASTROSCOPY, DUODENOSCOPY (EGD), BIOPSY SINGLE OR MULTIPLE;  Gastroscopy;  Surgeon: Yonny Zambrano MD;  Location: [facility]    NO HISTORY OF SURGERY  08/2007       Family History:    Family History   Problem Relation Age of Onset    Hypertension Mother     Diabetes Mother     Anxiety Disorder Mother     C.A.D. Maternal Grandmother     Hypertension Maternal Grandmother     Cerebrovascular Disease Maternal Grandmother     Cancer Paternal Grandfather         ? type    Diabetes Paternal Grandfather         Adult onset    Cancer Maternal Grandfather         ? renal    Diabetes Father     Hypertension Father     Cancer - colorectal Father     Coronary Artery Disease Father     Anxiety Disorder Father     Respiratory Daughter         asthma    Heart Disease Brother         double bypass    C.A.D. Brother     Cancer Brother         testicular ca    Hypertension Brother     Myocardial Infarction Brother     Breast Cancer No family hx of        Social History:  Marital Status:   Single [1]  Social History     Tobacco Use    Smoking status: Every Day     Current packs/day: 0.50     Average packs/day: 0.5 packs/day for 41.9 years (21.0 ttl pk-yrs)     Types: Cigarettes     Start date: 1/1/1983    Smokeless tobacco: Never    Tobacco comments:     5 cigarettes per day as of 6/5/2023   Vaping Use    Vaping status: Never Used   Substance Use Topics    Alcohol use: Not Currently     Comment: 1 beer per month, usually none    Drug use: No        Medications:    diclofenac (VOLTAREN) 1 % topical gel  albuterol (PROAIR HFA/PROVENTIL HFA/VENTOLIN HFA) 108 (90 Base) MCG/ACT inhaler  albuterol (PROVENTIL) (2.5 MG/3ML) 0.083% neb solution  atorvastatin (LIPITOR) 10 MG tablet  benzonatate (TESSALON) 200 MG capsule  budesonide-formoterol (SYMBICORT) 160-4.5 MCG/ACT Inhaler  budesonide-formoterol (SYMBICORT) 160-4.5 MCG/ACT Inhaler  budesonide-formoterol (SYMBICORT) 160-4.5 MCG/ACT Inhaler  clobetasol (TEMOVATE) 0.05 % external ointment  fluticasone (FLONASE) 50 MCG/ACT nasal spray  fluticasone (FLONASE) 50 MCG/ACT nasal spray  ipratropium - albuterol 0.5 mg/2.5 mg/3 mL (DUONEB) 0.5-2.5 (3) MG/3ML neb solution  levothyroxine (SYNTHROID/LEVOTHROID) 25 MCG tablet  lisinopril (ZESTRIL) 10 MG tablet  loratadine (CLARITIN) 10 MG tablet  metFORMIN (GLUCOPHAGE XR) 500 MG 24 hr tablet  nicotine (NICODERM CQ) 14 MG/24HR 24 hr patch          Physical Exam   BP: (!) 160/57  Pulse: 80  Temp: 97.5  F (36.4  C)  Resp: 16  SpO2: 97 %      Physical Exam  Vitals and nursing note reviewed.   Constitutional:       General: She is not in acute distress.     Appearance: She is not ill-appearing, toxic-appearing or diaphoretic.   HENT:      Head: Normocephalic and atraumatic.   Eyes:      Extraocular Movements: Extraocular movements intact.      Pupils: Pupils are equal, round, and reactive to light.   Cardiovascular:      Rate and Rhythm: Normal rate and regular rhythm.      Pulses: Normal pulses.   Pulmonary:      Effort:  Pulmonary effort is normal.   Musculoskeletal:         General: No swelling or deformity. Normal range of motion.      Cervical back: Normal range of motion. No tenderness. No spinous process tenderness or muscular tenderness.      Comments: Tenderness to palpation over the right lateral proximal humerus/shoulder.  No overlying redness, swelling, warmth, induration.  Range of motion of the right shoulder completely intact.  Strength of the right upper extremity 5/5, and symmetric compared to left.  No distal neurovascular deficits.   Skin:     General: Skin is warm.      Capillary Refill: Capillary refill takes less than 2 seconds.   Neurological:      General: No focal deficit present.      Mental Status: She is alert and oriented to person, place, and time.      Sensory: No sensory deficit.      Motor: No weakness.   Psychiatric:         Mood and Affect: Mood normal.         Behavior: Behavior normal.         ED Course        Procedures                    Results for orders placed or performed during the hospital encounter of 12/07/24 (from the past 24 hours)   Shoulder XR, 2 view, right    Narrative    EXAM: XR SHOULDER RIGHT 2 VIEWS  LOCATION: [facility]  DATE: 12/7/2024    INDICATION: right shoulder pain  COMPARISON: None.      Impression    IMPRESSION: The right glenohumeral and acromioclavicular joints are negative for fracture or dislocation. The right clavicle is negative.       Medications - No data to display    Assessments & Plan (with Medical Decision Making)     I have reviewed the nursing notes.    I have reviewed the findings, diagnosis, plan and need for follow up with the patient.    Medical Decision Making  Rose Mary Constantino is a 59 year old female with significant pmhx of GERD, NAFLD, hypertension, hypothyroidism, type 2 diabetes who presents for evaluation of shoulder pain.  Differential diagnoses include rotator cuff injury, skeletal injury, bursitis,  musculoskeletal strain, septic arthritis, osteoarthritis.  Vital signs with hypertension at 160/57.  Patient is afebrile, she is not tachycardic, and she is satting 97% on room air with a regular respiratory rate and effort.    On examination patient is well-appearing, alert, nontoxic.  She has full active range of motion of the right shoulder in all planes.  There is tenderness to palpation over the proximal humeral head, over the expected bursa area.  No significant deltoid muscle tenderness to palpation.  No tenderness to palpation over the scapular muscles, right side of the neck, midline neck.  No tenderness to palpation throughout the rest of the right upper extremity.  Distal neurovascular examination intact.  Strength of the right upper extremity 5/5 and symmetric compared to left.  No overlying redness, swelling, warmth, and induration.  Low suspicion for septic joint given intact full active range of motion with minimal pain, and no overlying skin changes.  X-ray of the lower right shoulder negative for arthritis, bony injury, effusion, or other acute abnormalities.    Suspect patient's pain is related to musculoskeletal injury or strain given it is worse with certain positions and lifting of the arm above the head.  Recommended Tylenol 500-1000 mg every 6 hours as needed, Voltaren gel up to 4 times a day as needed.  Recommended she continue her heating pad as this has been beneficial.  She was instructed to schedule follow-up appointments with her primary care provider and physical therapy.  She was instructed return to the ER if develop severely worsening pain and cannot move her arm, or if she develops fever, chest pain, difficulty breathing, numbness or weakness, swelling or warmth of the joint, or if other concerning symptoms develop.  Patient voiced understanding of the plan and had no further questions.      New Prescriptions    DICLOFENAC (VOLTAREN) 1 % TOPICAL GEL    Apply 2 g topically 4 times  daily.       Final diagnoses:   Acute pain of right shoulder       Nathaly Hsieh PA-C  December 7, 2024  [facility] EMERGENCY DEPT     Nathaly Hsieh PA-C  12/07/24 1035

## 2024-12-18 ENCOUNTER — LAB (OUTPATIENT)
Dept: LAB | Facility: CLINIC | Age: 59
End: 2024-12-18
Payer: COMMERCIAL

## 2024-12-18 DIAGNOSIS — E11.9 TYPE 2 DIABETES MELLITUS WITHOUT COMPLICATION, WITHOUT LONG-TERM CURRENT USE OF INSULIN (H): ICD-10-CM

## 2024-12-18 LAB
CREAT UR-MCNC: 69 MG/DL
EST. AVERAGE GLUCOSE BLD GHB EST-MCNC: 148 MG/DL
HBA1C MFR BLD: 6.8 % (ref 0–5.6)
MICROALBUMIN UR-MCNC: <12 MG/L
MICROALBUMIN/CREAT UR: NORMAL MG/G{CREAT}

## 2024-12-18 PROCEDURE — 82043 UR ALBUMIN QUANTITATIVE: CPT

## 2024-12-18 PROCEDURE — 36415 COLL VENOUS BLD VENIPUNCTURE: CPT

## 2024-12-18 PROCEDURE — 82570 ASSAY OF URINE CREATININE: CPT

## 2024-12-18 PROCEDURE — 83036 HEMOGLOBIN GLYCOSYLATED A1C: CPT

## 2025-01-06 ENCOUNTER — OFFICE VISIT (OUTPATIENT)
Dept: FAMILY MEDICINE | Facility: CLINIC | Age: 60
End: 2025-01-06
Payer: COMMERCIAL

## 2025-01-06 VITALS
WEIGHT: 140.2 LBS | DIASTOLIC BLOOD PRESSURE: 70 MMHG | SYSTOLIC BLOOD PRESSURE: 136 MMHG | HEART RATE: 64 BPM | RESPIRATION RATE: 16 BRPM | BODY MASS INDEX: 26.47 KG/M2 | TEMPERATURE: 97.6 F | OXYGEN SATURATION: 98 % | HEIGHT: 61 IN

## 2025-01-06 DIAGNOSIS — Z72.0 TOBACCO ABUSE: ICD-10-CM

## 2025-01-06 DIAGNOSIS — E11.9 TYPE 2 DIABETES MELLITUS WITHOUT COMPLICATION, WITHOUT LONG-TERM CURRENT USE OF INSULIN (H): Primary | ICD-10-CM

## 2025-01-06 PROCEDURE — G2211 COMPLEX E/M VISIT ADD ON: HCPCS | Performed by: NURSE PRACTITIONER

## 2025-01-06 PROCEDURE — 99214 OFFICE O/P EST MOD 30 MIN: CPT | Performed by: NURSE PRACTITIONER

## 2025-01-06 RX ORDER — NICOTINE 21 MG/24HR
1 PATCH, TRANSDERMAL 24 HOURS TRANSDERMAL EVERY 24 HOURS
Qty: 30 PATCH | Refills: 1 | Status: SHIPPED | OUTPATIENT
Start: 2025-01-06

## 2025-01-06 ASSESSMENT — ASTHMA QUESTIONNAIRES
QUESTION_3 LAST FOUR WEEKS HOW OFTEN DID YOUR ASTHMA SYMPTOMS (WHEEZING, COUGHING, SHORTNESS OF BREATH, CHEST TIGHTNESS OR PAIN) WAKE YOU UP AT NIGHT OR EARLIER THAN USUAL IN THE MORNING: ONCE OR TWICE
QUESTION_1 LAST FOUR WEEKS HOW MUCH OF THE TIME DID YOUR ASTHMA KEEP YOU FROM GETTING AS MUCH DONE AT WORK, SCHOOL OR AT HOME: NONE OF THE TIME
ACT_TOTALSCORE: 21
EMERGENCY_ROOM_LAST_YEAR_TOTAL: ONE
QUESTION_2 LAST FOUR WEEKS HOW OFTEN HAVE YOU HAD SHORTNESS OF BREATH: ONCE OR TWICE A WEEK
ACT_TOTALSCORE: 21
QUESTION_4 LAST FOUR WEEKS HOW OFTEN HAVE YOU USED YOUR RESCUE INHALER OR NEBULIZER MEDICATION (SUCH AS ALBUTEROL): ONCE A WEEK OR LESS
QUESTION_5 LAST FOUR WEEKS HOW WOULD YOU RATE YOUR ASTHMA CONTROL: WELL CONTROLLED

## 2025-01-06 ASSESSMENT — PAIN SCALES - GENERAL: PAINLEVEL_OUTOF10: NO PAIN (0)

## 2025-01-06 NOTE — PROGRESS NOTES
"  Assessment & Plan     Type 2 diabetes mellitus without complication, without long-term current use of insulin (H)  Well controlled.  A1c is improved.  Continue metformin at current dose  Follow up in 6 months.    Tobacco abuse  Patient working on quitting smoking:  - nicotine (NICODERM CQ) 21 MG/24HR 24 hr patch; Place 1 patch over 24 hours onto the skin every 24 hours.          BMI  Estimated body mass index is 26.27 kg/m  as calculated from the following:    Height as of this encounter: 1.556 m (5' 1.25\").    Weight as of this encounter: 63.6 kg (140 lb 3.2 oz).   Weight management plan: Discussed healthy diet and exercise guidelines      The risks, benefits and treatment options of prescribed medications or other treatments have been discussed with the patient. The patient verbalized their understanding and should call or follow up if no improvement or if they develop further problems.  Soledad Bauman, MICHAEL              Subjective   Zoraida is a 59 year old, presenting for the following health issues:  Diabetes, Medication Request (20mg nicotine patches to [facility] ), and Imm/Inj (Will do RSV vaccine in near future - order pended for future )        1/6/2025     7:05 AM   Additional Questions   Roomed by Vlad TAM CMA   Accompanied by self       Via the Health Maintenance questionnaire, the patient has reported the following services have been completed -Mammogram: [place] 2022-08-31, this information has not been sent to the abstraction team.  History of Present Illness       Diabetes:   She presents for follow up of diabetes.  She is checking home blood glucose one time daily.   She checks blood glucose before meals.  Blood glucose is never over 200 and never under 70. She is aware of hypoglycemia symptoms including other.    She has no concerns regarding her diabetes at this time.   She is not experiencing numbness or burning in feet, excessive thirst, blurry vision, weight changes or redness, sores or " "blisters on feet.           She eats 0-1 servings of fruits and vegetables daily.She consumes 1 sweetened beverage(s) daily.She exercises with enough effort to increase her heart rate 10 to 19 minutes per day.  She exercises with enough effort to increase her heart rate 3 or less days per week.   She is taking medications regularly.     She has been working on improving her dietary habits  Blood sugars are better  Has lost some weight.    Wt Readings from Last 4 Encounters:   01/06/25 63.6 kg (140 lb 3.2 oz)   11/04/24 68 kg (150 lb)   10/29/24 68 kg (150 lb)   06/07/24 65.8 kg (145 lb)       Working on quitting smoking  Nicotine patches have worked in the past - wants to start with the 21 mg dose.              Review of Systems  Constitutional, HEENT, cardiovascular, pulmonary, gi and gu systems are negative, except as otherwise noted.      Objective    /70   Pulse 64   Temp 97.6  F (36.4  C) (Tympanic)   Resp 16   Ht 1.556 m (5' 1.25\")   Wt 63.6 kg (140 lb 3.2 oz)   LMP 04/06/2016   SpO2 98%   BMI 26.27 kg/m    Body mass index is 26.27 kg/m .  Physical Exam   GENERAL: alert and no distress  HENT: ear canals and TM's normal, nose and mouth without ulcers or lesions  NECK: no adenopathy, no asymmetry, masses, or scars  RESP: lungs clear to auscultation - no rales, rhonchi or wheezes  CV: regular rate and rhythm, normal S1 S2, no S3 or S4, no murmur, click or rub, no peripheral edema  MS: no gross musculoskeletal defects noted, no edema    Lab on 12/18/2024   Component Date Value Ref Range Status    Estimated Average Glucose 12/18/2024 148 (H)  <117 mg/dL Final    Hemoglobin A1C 12/18/2024 6.8 (H)  0.0 - 5.6 % Final    Normal <5.7%   Prediabetes 5.7-6.4%    Diabetes 6.5% or higher     Note: Adopted from ADA consensus guidelines.    Creatinine Urine mg/dL 12/18/2024 69.0  mg/dL Final    The reference ranges have not been established in urine creatinine. The results should be integrated into the clinical " context for interpretation.    Albumin Urine mg/L 12/18/2024 <12.0  mg/L Final    The reference ranges have not been established in urine albumin. The results should be integrated into the clinical context for interpretation.    Albumin Urine mg/g Cr 12/18/2024    Final    Unable to calculate, urine albumin and/or urine creatinine is outside detectable limits.  Microalbuminuria is defined as an albumin:creatinine ratio of 17 to 299 for males and 25 to 299 for females. A ratio of albumin:creatinine of 300 or higher is indicative of overt proteinuria.  Due to biologic variability, positive results should be confirmed by a second, first-morning random or 24-hour timed urine specimen. If there is discrepancy, a third specimen is recommended. When 2 out of 3 results are in the microalbuminuria range, this is evidence for incipient nephropathy and warrants increased efforts at glucose control, blood pressure control, and institution of therapy with an angiotensin-converting-enzyme (ACE) inhibitor (if the patient can tolerate it).             The longitudinal plan of care for the diagnosis(es)/condition(s) as documented were addressed during this visit. Due to the added complexity in care, I will continue to support Zoraida in the subsequent management and with ongoing continuity of care.    Signed Electronically by: REDD Eubanks CNP

## 2025-03-22 ENCOUNTER — HEALTH MAINTENANCE LETTER (OUTPATIENT)
Age: 60
End: 2025-03-22

## 2025-03-25 ENCOUNTER — TELEPHONE (OUTPATIENT)
Dept: FAMILY MEDICINE | Facility: CLINIC | Age: 60
End: 2025-03-25
Payer: COMMERCIAL

## 2025-03-25 NOTE — LETTER
March 25, 2025    To  Rose Mary Constantino  [address] [facility] [place] [address]    Your team at [facility] cares about your health. We have reviewed your chart and based on our findings; we are making the following recommendations to better manage your health.     You are in particular need of attention regarding the following:     Schedule Annual MAMMOGRAPHY. [facility] scheduling number is [phone] or schedule in [facility] (self referral).  1 in 8 women will develop invasive breast cancer during her lifetime and it is the most common non-skin cancer in [place]. EARLY detection, new treatments, and a better understanding of the disease have increased survival rates- the 5 year survival rate in the 1960's was 63% and today it is close to 90%.    If you have already completed these items, please contact the clinic via phone or   [facility] so your care team can review and update your records. Thank you for   choosing [facility] for your healthcare needs. For any questions,   concerns, or to schedule an appointment please contact our clinic.    Healthy Regards,      Your [facility] Care Team            Electronically signed

## 2025-03-25 NOTE — TELEPHONE ENCOUNTER
Patient Quality Outreach    Patient is due for the following:   Breast Cancer Screening - Mammogram    Action(s) Taken:   Schedule mammogram    Type of outreach:    Sent [employer] message.    Questions for provider review:    None         Tran Ambrose CMA  Chart routed to .

## 2025-03-28 NOTE — NURSING NOTE
Immunizations Administered       Name Date Dose VIS Date Route    Pneumococcal 20 valent Conjugate (Prevnar 20) 6/7/24  8:21 AM 0.5 mL 05/12/2023, Given Today Intramuscular          Prior to immunization administration, verified patients identity using patient s name and date of birth. Please see Immunization Activity for additional information.     Screening Questionnaire for Adult Immunization    Are you sick today?   No   Do you have allergies to medications, food, a vaccine component or latex?   Yes   Have you ever had a serious reaction after receiving a vaccination?   No   Do you have a long-term health problem with heart, lung, kidney, or metabolic disease (e.g., diabetes), asthma, a blood disorder, no spleen, complement component deficiency, a cochlear implant, or a spinal fluid leak?  Are you on long-term aspirin therapy?   Yes   Do you have cancer, leukemia, HIV/AIDS, or any other immune system problem?   No   Do you have a parent, brother, or sister with an immune system problem?   No   In the past 3 months, have you taken medications that affect  your immune system, such as prednisone, other steroids, or anticancer drugs; drugs for the treatment of rheumatoid arthritis, Crohn s disease, or psoriasis; or have you had radiation treatments?   No   Have you had a seizure, or a brain or other nervous system problem?   No   During the past year, have you received a transfusion of blood or blood    products, or been given immune (gamma) globulin or antiviral drug?   No   For women: Are you pregnant or is there a chance you could become       pregnant during the next month?   No   Have you received any vaccinations in the past 4 weeks?   No     Immunization questionnaire was positive for at least one answer.  Notified Soledad Bauman NP who advised to give vaccination.      Patient instructed to remain in clinic for 15 minutes afterwards, and to report any adverse reactions.     Screening performed by Soledad  S. LPN on 6/7/2024 at 8:28 AM.         No indicators present

## 2025-04-03 ENCOUNTER — TRANSFERRED RECORDS (OUTPATIENT)
Dept: HEALTH INFORMATION MANAGEMENT | Facility: CLINIC | Age: 60
End: 2025-04-03
Payer: COMMERCIAL

## 2025-04-03 LAB — RETINOPATHY: NEGATIVE

## 2025-05-11 ENCOUNTER — HOSPITAL ENCOUNTER (OUTPATIENT)
Dept: CT IMAGING | Facility: CLINIC | Age: 60
Discharge: HOME | End: 2025-05-11
Attending: NURSE PRACTITIONER | Admitting: NURSE PRACTITIONER
Payer: COMMERCIAL

## 2025-05-11 DIAGNOSIS — Z87.891 PERSONAL HISTORY OF TOBACCO USE: ICD-10-CM

## 2025-05-11 PROCEDURE — 71271 CT THORAX LUNG CANCER SCR C-: CPT

## 2025-05-12 ENCOUNTER — RESULTS FOLLOW-UP (OUTPATIENT)
Dept: FAMILY MEDICINE | Facility: CLINIC | Age: 60
End: 2025-05-12

## 2025-05-19 ENCOUNTER — VIRTUAL VISIT (OUTPATIENT)
Dept: FAMILY MEDICINE | Facility: CLINIC | Age: 60
End: 2025-05-19
Payer: COMMERCIAL

## 2025-05-19 DIAGNOSIS — E11.9 TYPE 2 DIABETES MELLITUS WITHOUT COMPLICATION, WITHOUT LONG-TERM CURRENT USE OF INSULIN (H): ICD-10-CM

## 2025-05-19 DIAGNOSIS — E03.9 ACQUIRED HYPOTHYROIDISM: ICD-10-CM

## 2025-05-19 DIAGNOSIS — Z72.0 TOBACCO ABUSE: ICD-10-CM

## 2025-05-19 DIAGNOSIS — I25.10 CORONARY ARTERY DISEASE INVOLVING NATIVE CORONARY ARTERY OF NATIVE HEART WITHOUT ANGINA PECTORIS: Primary | ICD-10-CM

## 2025-05-19 PROCEDURE — 1126F AMNT PAIN NOTED NONE PRSNT: CPT | Mod: 95 | Performed by: NURSE PRACTITIONER

## 2025-05-19 PROCEDURE — 98006 SYNCH AUDIO-VIDEO EST MOD 30: CPT | Performed by: NURSE PRACTITIONER

## 2025-05-19 RX ORDER — ASPIRIN 81 MG/1
81 TABLET ORAL DAILY
COMMUNITY
Start: 2025-05-19

## 2025-05-19 RX ORDER — ATORVASTATIN CALCIUM 20 MG/1
20 TABLET, FILM COATED ORAL DAILY
Qty: 90 TABLET | Refills: 3 | Status: SHIPPED | OUTPATIENT
Start: 2025-05-19

## 2025-05-19 RX ORDER — NICOTINE 21 MG/24HR
1 PATCH, TRANSDERMAL 24 HOURS TRANSDERMAL EVERY 24 HOURS
Qty: 30 PATCH | Refills: 1 | Status: SHIPPED | OUTPATIENT
Start: 2025-05-19

## 2025-05-19 RX ORDER — BUPROPION HYDROCHLORIDE 150 MG/1
TABLET, FILM COATED, EXTENDED RELEASE ORAL
Qty: 180 TABLET | Refills: 0 | Status: SHIPPED | OUTPATIENT
Start: 2025-05-19 | End: 2026-05-22

## 2025-05-19 NOTE — PROGRESS NOTES
Zoraida is a 60 year old who is being evaluated via a billable video visit.    How would you like to obtain your AVS? MyChart  If the video visit is dropped, the invitation should be resent by: Text to cell phone: [phone]  Will anyone else be joining your video visit? No        Assessment & Plan     Coronary artery disease involving native coronary artery of native heart without angina pectoris  New diagnosis.  Asymptomatic.  Increase atorvastatin to 20 mg daily - if she tolerates, will increase to 40 mg daily  Start aspirin 81 mg daily.  Reviewed signs and symptoms to follow up for.  - aspirin 81 MG EC tablet; Take 1 tablet (81 mg) by mouth daily.  - atorvastatin (LIPITOR) 20 MG tablet; Take 1 tablet (20 mg) by mouth daily.    Tobacco abuse  Motivated to quit.  Recommend:  - nicotine (NICODERM CQ) 21 MG/24HR 24 hr patch; Place 1 patch over 24 hours onto the skin every 24 hours.  - buPROPion (ZYBAN) 150 MG 12 hr tablet; Take 1 tablet (150 mg) by mouth every morning for 3 days, THEN 1 tablet (150 mg) 2 times daily.  Follow up as needed.    Type 2 diabetes mellitus without complication, without long-term current use of insulin (H)  Due for labs:  - Lipid panel reflex to direct LDL Fasting; Future  - **Hemoglobin A1c FUTURE 3mo; Future  - Basic metabolic panel  (Ca, Cl, CO2, Creat, Gluc, K, Na, BUN); Future    Acquired hypothyroidism  Due for labs:  - TSH; Future          Nicotine/Tobacco Cessation  She reports that she has been smoking cigarettes. She started smoking about 42 years ago. She has a 21.2 pack-year smoking history. She has never used smokeless tobacco.  Nicotine/Tobacco Cessation Plan  Pharmacotherapies : bupropion (Zyban) and Nicotine patch      The risks, benefits and treatment options of prescribed medications or other treatments have been discussed with the patient. The patient verbalized their understanding and should call or follow up if no improvement or if they develop further  problems.  Soledad Bauman, MICHAEL                Subjective   Zoraida is a 60 year old, presenting for the following health issues:  Results (Discuss results of recent CT scan done 5/11/25 ), Health Maintenance (Due for mammo ), and Refill Request (Nicoderm patches 21mg)        5/19/2025     3:58 PM   Additional Questions   Roomed by Vlad TAM CMA   Accompanied by self     History of Present Illness       Reason for visit:  Results of CT   She is taking medications regularly.  Patient would like to discuss results of recent CT scan done 5/11/25 .      Had CT for lung cancer screening.    IMPRESSION:  1.  Negative for lung cancer screening purposes.     2.  Mild emphysematous changes.     3.  Moderate coronary calcified atherosclerosis.    No h/o CAD that she knew of.  Taking lipitor 10 mg daily - no side effects  She denies any chest pain or shortness of breath.        Wants to quit smoking  Would like nicotine patches.  Previously tried chantix - caused bad dreams.  Hasn't tried Zyban          Review of Systems  Constitutional, neuro, ENT, endocrine, pulmonary, cardiac, gastrointestinal, genitourinary, musculoskeletal, integument and psychiatric systems are negative, except as otherwise noted.      Objective    Vitals - Patient Reported  Pain Score: No Pain (0)        Physical Exam   GENERAL: alert and no distress  EYES: Eyes grossly normal to inspection.  No discharge or erythema, or obvious scleral/conjunctival abnormalities.  RESP: No audible wheeze, cough, or visible cyanosis.    SKIN: Visible skin clear. No significant rash, abnormal pigmentation or lesions.  NEURO: Cranial nerves grossly intact.  Mentation and speech appropriate for age.  PSYCH: Appropriate affect, tone, and pace of words          Video-Visit Details    Type of service:  Video Visit   Originating Location (pt. Location): Home    Distant Location (provider location):  On-site  Platform used for Video Visit: Tita  Signed Electronically by:  REDD Eubanks CNP

## 2025-05-29 ENCOUNTER — RESULTS FOLLOW-UP (OUTPATIENT)
Dept: FAMILY MEDICINE | Facility: CLINIC | Age: 60
End: 2025-05-29

## 2025-05-29 ENCOUNTER — LAB (OUTPATIENT)
Dept: LAB | Facility: CLINIC | Age: 60
End: 2025-05-29
Payer: COMMERCIAL

## 2025-05-29 DIAGNOSIS — E03.9 ACQUIRED HYPOTHYROIDISM: ICD-10-CM

## 2025-05-29 DIAGNOSIS — E11.9 TYPE 2 DIABETES MELLITUS WITHOUT COMPLICATION, WITHOUT LONG-TERM CURRENT USE OF INSULIN (H): ICD-10-CM

## 2025-05-29 LAB
ANION GAP SERPL CALCULATED.3IONS-SCNC: 15 MMOL/L (ref 7–15)
BUN SERPL-MCNC: 9 MG/DL (ref 8–23)
CALCIUM SERPL-MCNC: 9.5 MG/DL (ref 8.8–10.4)
CHLORIDE SERPL-SCNC: 104 MMOL/L (ref 98–107)
CHOLEST SERPL-MCNC: 135 MG/DL
CREAT SERPL-MCNC: 0.63 MG/DL (ref 0.51–0.95)
EGFRCR SERPLBLD CKD-EPI 2021: >90 ML/MIN/1.73M2
EST. AVERAGE GLUCOSE BLD GHB EST-MCNC: 151 MG/DL
FASTING STATUS PATIENT QL REPORTED: YES
FASTING STATUS PATIENT QL REPORTED: YES
GLUCOSE SERPL-MCNC: 113 MG/DL (ref 70–99)
HBA1C MFR BLD: 6.9 % (ref 0–5.6)
HCO3 SERPL-SCNC: 20 MMOL/L (ref 22–29)
HDLC SERPL-MCNC: 68 MG/DL
LDLC SERPL CALC-MCNC: 47 MG/DL
NONHDLC SERPL-MCNC: 67 MG/DL
POTASSIUM SERPL-SCNC: 4.3 MMOL/L (ref 3.4–5.3)
SODIUM SERPL-SCNC: 139 MMOL/L (ref 135–145)
TRIGL SERPL-MCNC: 101 MG/DL
TSH SERPL DL<=0.005 MIU/L-ACNC: 2.08 UIU/ML (ref 0.3–4.2)

## 2025-06-02 ENCOUNTER — PATIENT OUTREACH (OUTPATIENT)
Dept: CARE COORDINATION | Facility: CLINIC | Age: 60
End: 2025-06-02
Payer: COMMERCIAL

## 2025-07-02 ENCOUNTER — ANCILLARY PROCEDURE (OUTPATIENT)
Dept: GENERAL RADIOLOGY | Facility: CLINIC | Age: 60
End: 2025-07-02
Attending: STUDENT IN AN ORGANIZED HEALTH CARE EDUCATION/TRAINING PROGRAM
Payer: COMMERCIAL

## 2025-07-02 ENCOUNTER — OFFICE VISIT (OUTPATIENT)
Dept: ORTHOPEDICS | Facility: CLINIC | Age: 60
End: 2025-07-02
Payer: COMMERCIAL

## 2025-07-02 DIAGNOSIS — M17.11 PRIMARY OSTEOARTHRITIS OF RIGHT KNEE: ICD-10-CM

## 2025-07-02 DIAGNOSIS — G89.29 CHRONIC PAIN OF RIGHT KNEE: Primary | ICD-10-CM

## 2025-07-02 DIAGNOSIS — M25.561 CHRONIC PAIN OF RIGHT KNEE: ICD-10-CM

## 2025-07-02 DIAGNOSIS — M25.561 CHRONIC PAIN OF RIGHT KNEE: Primary | ICD-10-CM

## 2025-07-02 DIAGNOSIS — G89.29 CHRONIC PAIN OF RIGHT KNEE: ICD-10-CM

## 2025-07-02 PROCEDURE — 20611 DRAIN/INJ JOINT/BURSA W/US: CPT | Mod: RT | Performed by: STUDENT IN AN ORGANIZED HEALTH CARE EDUCATION/TRAINING PROGRAM

## 2025-07-02 PROCEDURE — 99214 OFFICE O/P EST MOD 30 MIN: CPT | Mod: 25 | Performed by: STUDENT IN AN ORGANIZED HEALTH CARE EDUCATION/TRAINING PROGRAM

## 2025-07-02 RX ORDER — ROPIVACAINE HYDROCHLORIDE 5 MG/ML
4 INJECTION, SOLUTION EPIDURAL; INFILTRATION; PERINEURAL
Status: COMPLETED | OUTPATIENT
Start: 2025-07-02 | End: 2025-07-02

## 2025-07-02 RX ORDER — BETAMETHASONE SODIUM PHOSPHATE AND BETAMETHASONE ACETATE 3; 3 MG/ML; MG/ML
6 INJECTION, SUSPENSION INTRA-ARTICULAR; INTRALESIONAL; INTRAMUSCULAR; SOFT TISSUE
Status: COMPLETED | OUTPATIENT
Start: 2025-07-02 | End: 2025-07-02

## 2025-07-02 RX ADMIN — ROPIVACAINE HYDROCHLORIDE 4 ML: 5 INJECTION, SOLUTION EPIDURAL; INFILTRATION; PERINEURAL at 15:22

## 2025-07-02 RX ADMIN — BETAMETHASONE SODIUM PHOSPHATE AND BETAMETHASONE ACETATE 6 MG: 3; 3 INJECTION, SUSPENSION INTRA-ARTICULAR; INTRALESIONAL; INTRAMUSCULAR; SOFT TISSUE at 15:22

## 2025-07-02 NOTE — PATIENT INSTRUCTIONS
[facility] [place]    Procedure: right knee cortisone injection    You may shower, however avoid swimming, tub baths or hot tubs for 24 hours following your procedure  You may have a mild to moderate increase in pain for several days following the injection.  It may take up to 14 days for the steroid medication to start working although you may feel the effect as early as a few days after the procedure.  You may use ice packs for 10-15 minutes, 3 to 4 times a day at the injection site for comfort  You may use anti-inflammatory medications (such as Ibuprofen or Aleve or Advil) or Tylenol for pain control if necessary and allowed to by your regular doctor  If you were fasting, you may resume your normal diet and medications after the procedure  If you have diabetes, check your blood sugar more frequently than usual as your blood sugar may be higher than normal for 10-14 days following a steroid injection. Contact your doctor who manages your diabetes if your blood sugar is higher than usual    If you experience any of the following, call [facility] @ [phone] or [phone]  - Fever over 100 degree F  - Swelling, bleeding, redness, drainage, warmth at the injection site  - New or worsening pain    Follow-up:   - we could do the HA (gel/ roostercomb) injection any time after 2 weeks    -  HA (gel/ roostercomb) injections can be done as frequently as every 6 months if needed  - cortisone injections can be done as frequently as every 3 months if needed

## 2025-07-02 NOTE — LETTER
7/2/2025      Rose Mary Constantino  [address] [facility] [place] [address]      Dear Colleague,    Thank you for referring your patient, Rose Mary Constantino, to the [facility]. Please see a copy of my visit note below.    ASSESSMENT & PLAN    Zoraida was seen today for pain.    Diagnoses and all orders for this visit:    Chronic pain of right knee  -     XR Knee Standing AP Bilat [place] Bilat Lat Right; Future  -     POC US GUIDANCE NEEDLE PLACEMENT; Future    Primary osteoarthritis of right knee  -     XR Knee Standing AP Bilat [place] Bilat Lat Right; Future  -     POC US GUIDANCE NEEDLE PLACEMENT; Future  -     Large Joint Injection/Arthocentesis: R knee joint      This issue is chronic and Worsening.      # Right knee pain: Rose Mary Constantino  was seen today for right knee pain. Symptoms had been going on for years. On examination there are positive findings of medial joint line tenderness. Imaging findings showed severe R knee OA, worst in the medial compartment with chronic subluxation.     Likely cause of patient's condition due to osteoarthritis. Counseled patient on nature of condition and treatment options.    - Activity: activity as tolerated and home exercises given  - Ice, heat, massage as needed   - Medications:      - ibuprofen 600mg or diclofenac (voltaren) gel three times daily as needed     - tylenol 1000mg three times daily as needed  - Injections: tolerated right knee cortisone injection      Follow-up:   - we could do the HA (gel/ roostercomb) injection any time after 2 weeks    -  HA (gel/ roostercomb) injections can be done as frequently as every 6 months if needed  - cortisone injections can be done as frequently as every 3 months if needed      Tj Berg MD  [facility]    -----  Chief Complaint   Patient presents with     Right Knee - Pain       SUBJECTIVE  Rose Mary Constantino is a/an 60 year old female  who is seen as a self referral for evaluation of right knee. She would like to discuss a possible steroid injection today.    The patient is seen by themselves.    Onset: ~3 month(s) ago. Reports insidious onset without acute precipitating event. She reports that pain significantly worsened last week, but has since improved a bit. She reports that her pain can awaken her from sleep.  Location of Pain: right medial knee  Worsened by: knee flexion, extension, walking  Better with: compression sleeve, patellar tendon strap, ibuprofen, Tylenol, ice  Treatments tried: compression sleeve, patellar tendon strap, ibuprofen, Tylenol, ice  Associated symptoms: tightness/stiffness, limping    Orthopedic/Surgical history: YES - saw Dr. Moreno for right knee arthritis 2018  Social History/Occupation: works for [employer] service - on her feet a lot      REVIEW OF SYSTEMS:  Review of Systems    OBJECTIVE:  LMP 04/06/2016    General: healthy, alert and in no distress  Skin: no suspicious lesions or rash.  CV: distal perfusion intact   Resp: normal respiratory effort without conversational dyspnea   Psych: normal mood and affect  Gait: NORMAL  Neuro: Normal light sensory exam of right lower extremity    RIGHT KNEE  Inspection:    Normal alignment; no edema, erythema, or ecchymosis present  Palpation:    Tender about the medial joint line. Remainder of bony and ligamentous landmarks are nontender.    Small effusion is present    Patellofemoral crepitus is Absent  Range of Motion:     00 extension to 1200 flexion  Strength:    Quadriceps 5/5 and hamstrings 5/5    Extensor mechanism intact  Special Tests:    Negative: MCL/valgus stress (0 & 30 deg), LCL/varus stress (0 & 30 deg), Lachman's, anterior drawer, posterior drawer       RADIOLOGY:  Final results and radiologist's interpretation, available in the [facility] health record.  Images were reviewed with the patient in the office today.  My personal interpretation of the performed  imaging:   -  severe R knee OA, worst in the medial compartment with chronic subluxation .        Large Joint Injection/Arthocentesis: R knee joint    Date/Time: 7/2/2025 3:22 PM    Performed by: Tj Berg MD  Authorized by: Tj Berg MD    Indications:  Pain  Needle Size:  21 G  Guidance: ultrasound    Approach:  Superolateral  Location:  Knee      Medications:  4 mL ROPivacaine 5 MG/ML; 6 mg betamethasone acet & sod phos 6 (3-3) MG/ML  Aspirate amount (mL):  4  Aspirate:  Clear and yellow  Outcome:  Tolerated well, no immediate complications  Procedure discussed: discussed risks, benefits, and alternatives    Consent Given by:  Patient  Timeout: timeout called immediately prior to procedure    Prep: patient was prepped and draped in usual sterile fashion     Patient tolerated right knee cortisone injection and aspiration. Ultrasound guidance was required to ensure correct needle placement for injection, ensure maximal aspiration, and avoid vital surrounding structures. Ultrasound guided images were permanently stored. Aftercare instructions given to patient. Plan to follow-up as above.     Tj Berg MD          Patient was independently assessed by me and was deemed appropriate for this procedure. Risks and benefits were discussed with good understanding including, but not limited to, the risks of bleeding, reaction to the medication, infection, potential delay of any surgical intervention by 3 months, temporary elevation of blood sugars, worsening of arthritis, and the possibility of the treatment being ineffective. The patient tolerated the procedure well with no complications.    After visit care instructions were discussed in person and provided in AVS.      Again, thank you for allowing me to participate in the care of your patient.        Sincerely,        Tj Berg MD    Electronically signed no

## 2025-07-02 NOTE — PROGRESS NOTES
ASSESSMENT & PLAN    Zoraida was seen today for pain.    Diagnoses and all orders for this visit:    Chronic pain of right knee  -     XR Knee Standing AP Bilat Brillion Bilat Lat Right; Future  -     POC US GUIDANCE NEEDLE PLACEMENT; Future    Primary osteoarthritis of right knee  -     XR Knee Standing AP Bilat Brillion Bilat Lat Right; Future  -     POC US GUIDANCE NEEDLE PLACEMENT; Future  -     Large Joint Injection/Arthocentesis: R knee joint      This issue is chronic and Worsening.      # Right knee pain: Rose Mary Constantino  was seen today for right knee pain. Symptoms had been going on for years. On examination there are positive findings of medial joint line tenderness. Imaging findings showed severe R knee OA, worst in the medial compartment with chronic subluxation.     Likely cause of patient's condition due to osteoarthritis. Counseled patient on nature of condition and treatment options.    - Activity: activity as tolerated and home exercises given  - Ice, heat, massage as needed   - Medications:      - ibuprofen 600mg or diclofenac (voltaren) gel three times daily as needed     - tylenol 1000mg three times daily as needed  - Injections: tolerated right knee cortisone injection      Follow-up:   - we could do the HA (gel/ roostercomb) injection any time after 2 weeks    -  HA (gel/ roostercomb) injections can be done as frequently as every 6 months if needed  - cortisone injections can be done as frequently as every 3 months if needed      Tj Berg MD  [facility] SPOR[facility] MEDICINE [facility]    -----  Chief Complaint   Patient presents with    Right Knee - Pain       SUBJECTIVE  Rose Mary Constantino is a/an 60 year old female who is seen as a self referral for evaluation of right knee. She would like to discuss a possible steroid injection today.    The patient is seen by themselves.    Onset: ~3 month(s) ago. Reports insidious onset without acute precipitating event. She reports  that pain significantly worsened last week, but has since improved a bit. She reports that her pain can awaken her from sleep.  Location of Pain: right medial knee  Worsened by: knee flexion, extension, walking  Better with: compression sleeve, patellar tendon strap, ibuprofen, Tylenol, ice  Treatments tried: compression sleeve, patellar tendon strap, ibuprofen, Tylenol, ice  Associated symptoms: tightness/stiffness, limping    Orthopedic/Surgical history: YES - saw Dr. Moreno for right knee arthritis 2018  Social History/Occupation: works for [email or web address] service - on her feet a lot      REVIEW OF SYSTEMS:  Review of Systems    OBJECTIVE:  LMP 04/06/2016    General: healthy, alert and in no distress  Skin: no suspicious lesions or rash.  CV: distal perfusion intact   Resp: normal respiratory effort without conversational dyspnea   Psych: normal mood and affect  Gait: NORMAL  Neuro: Normal light sensory exam of right lower extremity    RIGHT KNEE  Inspection:    Normal alignment; no edema, erythema, or ecchymosis present  Palpation:    Tender about the medial joint line. Remainder of bony and ligamentous landmarks are nontender.    Small effusion is present    Patellofemoral crepitus is Absent  Range of Motion:     00 extension to 1200 flexion  Strength:    Quadriceps 5/5 and hamstrings 5/5    Extensor mechanism intact  Special Tests:    Negative: MCL/valgus stress (0 & 30 deg), LCL/varus stress (0 & 30 deg), Lachman's, anterior drawer, posterior drawer       RADIOLOGY:  Final results and radiologist's interpretation, available in the [facility] health record.  Images were reviewed with the patient in the office today.  My personal interpretation of the performed imaging:   -  severe R knee OA, worst in the medial compartment with chronic subluxation .        Large Joint Injection/Arthocentesis: R knee joint    Date/Time: 7/2/2025 3:22 PM    Performed by: Tj Berg MD  Authorized by: Tj Berg MD     Indications:  Pain  Needle Size:  21 G  Guidance: ultrasound    Approach:  Superolateral  Location:  Knee      Medications:  4 mL ROPivacaine 5 MG/ML; 6 mg betamethasone acet & sod phos 6 (3-3) MG/ML  Aspirate amount (mL):  4  Aspirate:  Clear and yellow  Outcome:  Tolerated well, no immediate complications  Procedure discussed: discussed risks, benefits, and alternatives    Consent Given by:  Patient  Timeout: timeout called immediately prior to procedure    Prep: patient was prepped and draped in usual sterile fashion     Patient tolerated right knee cortisone injection and aspiration. Ultrasound guidance was required to ensure correct needle placement for injection, ensure maximal aspiration, and avoid vital surrounding structures. Ultrasound guided images were permanently stored. Aftercare instructions given to patient. Plan to follow-up as above.     Tj Berg MD          Patient was independently assessed by me and was deemed appropriate for this procedure. Risks and benefits were discussed with good understanding including, but not limited to, the risks of bleeding, reaction to the medication, infection, potential delay of any surgical intervention by 3 months, temporary elevation of blood sugars, worsening of arthritis, and the possibility of the treatment being ineffective. The patient tolerated the procedure well with no complications.    After visit care instructions were discussed in person and provided in AVS.

## 2025-07-20 ENCOUNTER — MYC REFILL (OUTPATIENT)
Dept: FAMILY MEDICINE | Facility: CLINIC | Age: 60
End: 2025-07-20
Payer: COMMERCIAL

## 2025-07-20 DIAGNOSIS — J45.40 MODERATE PERSISTENT ASTHMA WITHOUT COMPLICATION: ICD-10-CM

## 2025-07-21 ENCOUNTER — TELEPHONE (OUTPATIENT)
Dept: ORTHOPEDICS | Facility: CLINIC | Age: 60
End: 2025-07-21
Payer: COMMERCIAL

## 2025-07-21 DIAGNOSIS — M17.11 PRIMARY OSTEOARTHRITIS OF RIGHT KNEE: Primary | ICD-10-CM

## 2025-07-21 RX ORDER — BUDESONIDE AND FORMOTEROL FUMARATE DIHYDRATE 160; 4.5 UG/1; UG/1
2 AEROSOL RESPIRATORY (INHALATION)
Qty: 30.6 G | Refills: 4 | Status: SHIPPED | OUTPATIENT
Start: 2025-07-21

## 2025-07-21 RX ORDER — ALBUTEROL SULFATE 90 UG/1
INHALANT RESPIRATORY (INHALATION)
Qty: 25.5 G | Refills: 5 | Status: SHIPPED | OUTPATIENT
Start: 2025-07-21

## 2025-07-21 RX ORDER — BUDESONIDE AND FORMOTEROL FUMARATE DIHYDRATE 160; 4.5 UG/1; UG/1
AEROSOL RESPIRATORY (INHALATION) 2 TIMES DAILY
Qty: 30.6 G | Refills: 3 | OUTPATIENT
Start: 2025-07-21

## 2025-07-21 ASSESSMENT — ASTHMA QUESTIONNAIRES
QUESTION_3 LAST FOUR WEEKS HOW OFTEN DID YOUR ASTHMA SYMPTOMS (WHEEZING, COUGHING, SHORTNESS OF BREATH, CHEST TIGHTNESS OR PAIN) WAKE YOU UP AT NIGHT OR EARLIER THAN USUAL IN THE MORNING: ONCE OR TWICE
QUESTION_1 LAST FOUR WEEKS HOW MUCH OF THE TIME DID YOUR ASTHMA KEEP YOU FROM GETTING AS MUCH DONE AT WORK, SCHOOL OR AT HOME: NONE OF THE TIME
QUESTION_5 LAST FOUR WEEKS HOW WOULD YOU RATE YOUR ASTHMA CONTROL: WELL CONTROLLED
QUESTION_2 LAST FOUR WEEKS HOW OFTEN HAVE YOU HAD SHORTNESS OF BREATH: ONCE OR TWICE A WEEK
QUESTION_4 LAST FOUR WEEKS HOW OFTEN HAVE YOU USED YOUR RESCUE INHALER OR NEBULIZER MEDICATION (SUCH AS ALBUTEROL): ONCE A WEEK OR LESS
ACT_TOTALSCORE: 21

## 2025-07-21 NOTE — TELEPHONE ENCOUNTER
Patient scheduled for appointment on 8/6/25 @ [facility] [place] for discussion of viscosupplementation injection vs steroid injection of right knee.        Steroid  injection last completed 7/2/25.  Patient reports relief for 2 weeks    Patient has failed 3 month trial of Pharmacologic Approach (e.g., topical NSAIDs, oral NSAIDs with or without oral proton pump inhibitors, MOMIN-2 inhibitors, topical capsaicin, acetaminophen, tramadol, duloxetine, etc.):  Yes     Patient has completed 3 month trial of Non-Pharmacologic treatments (i.e., physical, psychosocial, or mind-body approach (e.g., exercise-land based or aquatic, physical therapy, mireille chi, yoga, weight management, cognitive behavioral therapy, knee brace or cane, etc).  Yes    Has patient had prior reaction to Synvisc/SynviscOne or any alternative HA product?: No    Prior authorization referral for SynviscOne injection pended.    Please advise  JUNE Castro, ATC

## 2025-08-06 ENCOUNTER — OFFICE VISIT (OUTPATIENT)
Dept: ORTHOPEDICS | Facility: CLINIC | Age: 60
End: 2025-08-06
Payer: COMMERCIAL

## 2025-08-06 DIAGNOSIS — M17.11 PRIMARY OSTEOARTHRITIS OF RIGHT KNEE: Primary | ICD-10-CM

## 2025-08-06 PROCEDURE — 20611 DRAIN/INJ JOINT/BURSA W/US: CPT | Mod: RT | Performed by: STUDENT IN AN ORGANIZED HEALTH CARE EDUCATION/TRAINING PROGRAM

## 2025-08-06 PROCEDURE — 99207 PR DROP WITH A PROCEDURE: CPT | Performed by: STUDENT IN AN ORGANIZED HEALTH CARE EDUCATION/TRAINING PROGRAM

## 2025-08-21 ENCOUNTER — HOSPITAL ENCOUNTER (EMERGENCY)
Facility: CLINIC | Age: 60
Discharge: HOME | End: 2025-08-21
Payer: COMMERCIAL

## 2025-08-21 VITALS
SYSTOLIC BLOOD PRESSURE: 164 MMHG | DIASTOLIC BLOOD PRESSURE: 86 MMHG | RESPIRATION RATE: 18 BRPM | OXYGEN SATURATION: 95 % | HEART RATE: 75 BPM | TEMPERATURE: 97.6 F

## 2025-08-21 DIAGNOSIS — J45.901 REACTIVE AIRWAY DISEASE WITH ACUTE EXACERBATION, UNSPECIFIED ASTHMA SEVERITY, UNSPECIFIED WHETHER PERSISTENT: Primary | ICD-10-CM

## 2025-08-21 PROCEDURE — 99214 OFFICE O/P EST MOD 30 MIN: CPT

## 2025-08-21 PROCEDURE — G0463 HOSPITAL OUTPT CLINIC VISIT: HCPCS

## 2025-08-21 RX ORDER — PREDNISONE 20 MG/1
TABLET ORAL
Qty: 10 TABLET | Refills: 0 | Status: SHIPPED | OUTPATIENT
Start: 2025-08-21

## 2025-08-21 RX ORDER — AZITHROMYCIN 250 MG/1
TABLET, FILM COATED ORAL
Qty: 6 TABLET | Refills: 0 | Status: SHIPPED | OUTPATIENT
Start: 2025-08-21 | End: 2025-08-26

## 2025-08-21 ASSESSMENT — ACTIVITIES OF DAILY LIVING (ADL): ADLS_ACUITY_SCORE: 41

## (undated) RX ORDER — PROPOFOL 10 MG/ML
INJECTION, EMULSION INTRAVENOUS
Status: DISPENSED
Start: 2024-11-04

## (undated) RX ORDER — LIDOCAINE HYDROCHLORIDE 10 MG/ML
INJECTION, SOLUTION INFILTRATION; PERINEURAL
Status: DISPENSED
Start: 2018-06-04

## (undated) RX ORDER — GLYCOPYRROLATE 0.2 MG/ML
INJECTION, SOLUTION INTRAMUSCULAR; INTRAVENOUS
Status: DISPENSED
Start: 2018-06-04